# Patient Record
Sex: FEMALE | Race: WHITE | NOT HISPANIC OR LATINO | Employment: OTHER | URBAN - METROPOLITAN AREA
[De-identification: names, ages, dates, MRNs, and addresses within clinical notes are randomized per-mention and may not be internally consistent; named-entity substitution may affect disease eponyms.]

---

## 2017-03-15 ENCOUNTER — TRANSCRIBE ORDERS (OUTPATIENT)
Dept: ADMINISTRATIVE | Facility: HOSPITAL | Age: 72
End: 2017-03-15

## 2017-03-15 DIAGNOSIS — Z12.31 VISIT FOR SCREENING MAMMOGRAM: Primary | ICD-10-CM

## 2017-04-24 ENCOUNTER — HOSPITAL ENCOUNTER (OUTPATIENT)
Dept: MAMMOGRAPHY | Facility: HOSPITAL | Age: 72
Discharge: HOME | End: 2017-04-24
Admitting: INTERNAL MEDICINE

## 2017-04-24 DIAGNOSIS — Z12.31 VISIT FOR SCREENING MAMMOGRAM: ICD-10-CM

## 2017-04-24 PROCEDURE — 77063 BREAST TOMOSYNTHESIS BI: CPT | Performed by: RADIOLOGY

## 2017-04-24 PROCEDURE — G0202 SCR MAMMO BI INCL CAD: HCPCS

## 2017-04-24 PROCEDURE — 77063 BREAST TOMOSYNTHESIS BI: CPT

## 2017-04-24 PROCEDURE — G0202 SCR MAMMO BI INCL CAD: HCPCS | Performed by: RADIOLOGY

## 2017-10-22 ENCOUNTER — APPOINTMENT (OUTPATIENT)
Dept: CT IMAGING | Facility: HOSPITAL | Age: 72
End: 2017-10-22

## 2017-10-22 ENCOUNTER — HOSPITAL ENCOUNTER (EMERGENCY)
Facility: HOSPITAL | Age: 72
Discharge: HOME | End: 2017-10-22
Attending: EMERGENCY MEDICINE | Admitting: EMERGENCY MEDICINE

## 2017-10-22 VITALS
DIASTOLIC BLOOD PRESSURE: 71 MMHG | BODY MASS INDEX: 27.38 KG/M2 | WEIGHT: 145 LBS | SYSTOLIC BLOOD PRESSURE: 138 MMHG | TEMPERATURE: 97.8 F | HEART RATE: 81 BPM | HEIGHT: 61 IN | OXYGEN SATURATION: 97 % | RESPIRATION RATE: 16 BRPM

## 2017-10-22 DIAGNOSIS — M54.41 ACUTE LOW BACK PAIN WITH BILATERAL SCIATICA, UNSPECIFIED BACK PAIN LATERALITY: Primary | ICD-10-CM

## 2017-10-22 DIAGNOSIS — M54.42 ACUTE LOW BACK PAIN WITH BILATERAL SCIATICA, UNSPECIFIED BACK PAIN LATERALITY: Primary | ICD-10-CM

## 2017-10-22 LAB
ALBUMIN SERPL-MCNC: 4.7 G/DL (ref 3.2–4.8)
ALBUMIN/GLOB SERPL: 1.5 G/DL (ref 1.5–2.5)
ALP SERPL-CCNC: 40 U/L (ref 25–100)
ALT SERPL W P-5'-P-CCNC: 22 U/L (ref 7–40)
ANION GAP SERPL CALCULATED.3IONS-SCNC: 6 MMOL/L (ref 3–11)
AST SERPL-CCNC: 24 U/L (ref 0–33)
BASOPHILS # BLD AUTO: 0.04 10*3/MM3 (ref 0–0.2)
BASOPHILS NFR BLD AUTO: 0.6 % (ref 0–1)
BILIRUB SERPL-MCNC: 0.6 MG/DL (ref 0.3–1.2)
BILIRUB UR QL STRIP: NEGATIVE
BUN BLD-MCNC: 17 MG/DL (ref 9–23)
BUN/CREAT SERPL: 21.3 (ref 7–25)
CALCIUM SPEC-SCNC: 9.6 MG/DL (ref 8.7–10.4)
CHLORIDE SERPL-SCNC: 105 MMOL/L (ref 99–109)
CLARITY UR: CLEAR
CO2 SERPL-SCNC: 26 MMOL/L (ref 20–31)
COLOR UR: YELLOW
CREAT BLD-MCNC: 0.8 MG/DL (ref 0.6–1.3)
DEPRECATED RDW RBC AUTO: 41 FL (ref 37–54)
EOSINOPHIL # BLD AUTO: 0.19 10*3/MM3 (ref 0–0.3)
EOSINOPHIL NFR BLD AUTO: 2.7 % (ref 0–3)
ERYTHROCYTE [DISTWIDTH] IN BLOOD BY AUTOMATED COUNT: 13.4 % (ref 11.3–14.5)
GFR SERPL CREATININE-BSD FRML MDRD: 71 ML/MIN/1.73
GLOBULIN UR ELPH-MCNC: 3.1 GM/DL
GLUCOSE BLD-MCNC: 101 MG/DL (ref 70–100)
GLUCOSE UR STRIP-MCNC: NEGATIVE MG/DL
HCT VFR BLD AUTO: 41.3 % (ref 34.5–44)
HGB BLD-MCNC: 14.2 G/DL (ref 11.5–15.5)
HGB UR QL STRIP.AUTO: NEGATIVE
HOLD SPECIMEN: NORMAL
HOLD SPECIMEN: NORMAL
IMM GRANULOCYTES # BLD: 0.04 10*3/MM3 (ref 0–0.03)
IMM GRANULOCYTES NFR BLD: 0.6 % (ref 0–0.6)
KETONES UR QL STRIP: NEGATIVE
LEUKOCYTE ESTERASE UR QL STRIP.AUTO: NEGATIVE
LIPASE SERPL-CCNC: 44 U/L (ref 6–51)
LYMPHOCYTES # BLD AUTO: 1.87 10*3/MM3 (ref 0.6–4.8)
LYMPHOCYTES NFR BLD AUTO: 26.1 % (ref 24–44)
MCH RBC QN AUTO: 29 PG (ref 27–31)
MCHC RBC AUTO-ENTMCNC: 34.4 G/DL (ref 32–36)
MCV RBC AUTO: 84.5 FL (ref 80–99)
MONOCYTES # BLD AUTO: 0.66 10*3/MM3 (ref 0–1)
MONOCYTES NFR BLD AUTO: 9.2 % (ref 0–12)
NEUTROPHILS # BLD AUTO: 4.36 10*3/MM3 (ref 1.5–8.3)
NEUTROPHILS NFR BLD AUTO: 60.8 % (ref 41–71)
NITRITE UR QL STRIP: NEGATIVE
PH UR STRIP.AUTO: 7.5 [PH] (ref 5–8)
PLATELET # BLD AUTO: 255 10*3/MM3 (ref 150–450)
PMV BLD AUTO: 9.7 FL (ref 6–12)
POTASSIUM BLD-SCNC: 4 MMOL/L (ref 3.5–5.5)
PROT SERPL-MCNC: 7.8 G/DL (ref 5.7–8.2)
PROT UR QL STRIP: NEGATIVE
RBC # BLD AUTO: 4.89 10*6/MM3 (ref 3.89–5.14)
SODIUM BLD-SCNC: 137 MMOL/L (ref 132–146)
SP GR UR STRIP: 1.01 (ref 1–1.03)
UROBILINOGEN UR QL STRIP: NORMAL
WBC NRBC COR # BLD: 7.16 10*3/MM3 (ref 3.5–10.8)
WHOLE BLOOD HOLD SPECIMEN: NORMAL
WHOLE BLOOD HOLD SPECIMEN: NORMAL

## 2017-10-22 PROCEDURE — 72131 CT LUMBAR SPINE W/O DYE: CPT

## 2017-10-22 PROCEDURE — 74176 CT ABD & PELVIS W/O CONTRAST: CPT

## 2017-10-22 PROCEDURE — 96374 THER/PROPH/DIAG INJ IV PUSH: CPT

## 2017-10-22 PROCEDURE — 80053 COMPREHEN METABOLIC PANEL: CPT | Performed by: EMERGENCY MEDICINE

## 2017-10-22 PROCEDURE — 85025 COMPLETE CBC W/AUTO DIFF WBC: CPT | Performed by: EMERGENCY MEDICINE

## 2017-10-22 PROCEDURE — 25010000002 ONDANSETRON PER 1 MG: Performed by: EMERGENCY MEDICINE

## 2017-10-22 PROCEDURE — 99284 EMERGENCY DEPT VISIT MOD MDM: CPT

## 2017-10-22 PROCEDURE — 96375 TX/PRO/DX INJ NEW DRUG ADDON: CPT

## 2017-10-22 PROCEDURE — 81003 URINALYSIS AUTO W/O SCOPE: CPT | Performed by: EMERGENCY MEDICINE

## 2017-10-22 PROCEDURE — 83690 ASSAY OF LIPASE: CPT | Performed by: EMERGENCY MEDICINE

## 2017-10-22 PROCEDURE — 25010000002 HYDROMORPHONE PER 4 MG: Performed by: EMERGENCY MEDICINE

## 2017-10-22 RX ORDER — AMLODIPINE BESYLATE 5 MG/1
5 TABLET ORAL DAILY
COMMUNITY

## 2017-10-22 RX ORDER — METHOCARBAMOL 750 MG/1
750 TABLET, FILM COATED ORAL 3 TIMES DAILY PRN
Qty: 15 TABLET | Refills: 0 | Status: SHIPPED | OUTPATIENT
Start: 2017-10-22 | End: 2017-11-10

## 2017-10-22 RX ORDER — ONDANSETRON 2 MG/ML
4 INJECTION INTRAMUSCULAR; INTRAVENOUS ONCE
Status: COMPLETED | OUTPATIENT
Start: 2017-10-22 | End: 2017-10-22

## 2017-10-22 RX ORDER — SODIUM CHLORIDE 0.9 % (FLUSH) 0.9 %
10 SYRINGE (ML) INJECTION AS NEEDED
Status: DISCONTINUED | OUTPATIENT
Start: 2017-10-22 | End: 2017-10-22 | Stop reason: HOSPADM

## 2017-10-22 RX ORDER — HYDROMORPHONE HYDROCHLORIDE 1 MG/ML
0.25 INJECTION, SOLUTION INTRAMUSCULAR; INTRAVENOUS; SUBCUTANEOUS ONCE
Status: COMPLETED | OUTPATIENT
Start: 2017-10-22 | End: 2017-10-22

## 2017-10-22 RX ADMIN — HYDROMORPHONE HYDROCHLORIDE 0.25 MG: 1 INJECTION, SOLUTION INTRAMUSCULAR; INTRAVENOUS; SUBCUTANEOUS at 12:29

## 2017-10-22 RX ADMIN — ONDANSETRON 4 MG: 2 INJECTION INTRAMUSCULAR; INTRAVENOUS at 12:28

## 2017-10-22 NOTE — ED PROVIDER NOTES
Subjective   HPI Comments: Patient sees Dr. Ruiz for chronic back pain gets injections about every 3 months.  Last injection was about 3 months ago.    Patient is a 72 y.o. female presenting with back pain.   History provided by:  Patient   used: No    Back Pain   Location:  Lumbar spine  Quality:  Aching and stiffness  Stiffness is present:  Unable to specify  Radiates to:  Does not radiate  Pain severity:  Severe  Onset quality:  Sudden  Duration:  2 days  Timing:  Constant  Chronicity:  New  Context comment:  Patient had no fall no trauma.  She has a history of kidney stones this feels somewhat similar but movement does increase her pain.  Relieved by:  Being still  Worsened by:  Movement  Ineffective treatments:  None tried  Associated symptoms: abdominal pain    Associated symptoms: no bladder incontinence, no dysuria, no fever, no leg pain, no numbness, no tingling, no weakness and no weight loss        Review of Systems   Constitutional: Negative for chills, fever and weight loss.   Respiratory: Negative for chest tightness, shortness of breath and wheezing.    Gastrointestinal: Positive for abdominal pain. Negative for diarrhea, nausea and vomiting.   Genitourinary: Negative for bladder incontinence, dysuria, hematuria and urgency.   Musculoskeletal: Positive for back pain.   Neurological: Negative for tingling, weakness and numbness.   Psychiatric/Behavioral: Negative.    All other systems reviewed and are negative.      History reviewed. No pertinent past medical history.    Allergies   Allergen Reactions   • Codeine    • Morphine And Related        Past Surgical History:   Procedure Laterality Date   • HYSTERECTOMY  1992       Family History   Problem Relation Age of Onset   • Breast cancer Neg Hx    • Ovarian cancer Neg Hx        Social History     Social History   • Marital status:      Spouse name: N/A   • Number of children: N/A   • Years of education: N/A     Social History  Main Topics   • Smoking status: Former Smoker   • Smokeless tobacco: None   • Alcohol use No   • Drug use: No   • Sexual activity: Not Asked     Other Topics Concern   • None     Social History Narrative   • None           Objective   Physical Exam   Constitutional: She is oriented to person, place, and time. She appears well-developed and well-nourished.   HENT:   Head: Normocephalic and atraumatic.   Right Ear: External ear normal.   Left Ear: External ear normal.   Nose: Nose normal.   Mouth/Throat: Oropharynx is clear and moist.   Eyes: Conjunctivae and EOM are normal. Pupils are equal, round, and reactive to light. No scleral icterus.   Neck: Normal range of motion. No thyromegaly present.   Cardiovascular: Normal rate, regular rhythm and normal heart sounds.    Pulmonary/Chest: Effort normal and breath sounds normal. No respiratory distress. She has no wheezes. She has no rales. She exhibits no tenderness.   Abdominal: Soft. Bowel sounds are normal. She exhibits no distension. There is tenderness (  Mild diffuse tenderness across the lower abdomen.  No guarding or rebound or rigidity no CVA tenderness and no mass).   Musculoskeletal: Normal range of motion.   Tender over the lumbar spine itself.  There is no step-off no crepitus.  There is no rash or lesions.   Lymphadenopathy:     She has no cervical adenopathy.   Neurological: She is alert and oriented to person, place, and time. She has normal reflexes. She displays normal reflexes. No cranial nerve deficit. Coordination normal.   Skin: Skin is warm and dry.   Psychiatric: She has a normal mood and affect. Her behavior is normal. Judgment and thought content normal.   Nursing note and vitals reviewed.      Procedures         ED Course  ED Course   Comment By Time   Patient's abdomen is completely relieved with the medication.  She is given a call Dr. Campbell in the morning to get a follow-up.  I discussed the findings of her CT scans and motor findings. Micah  OSMANI Olvera 10/22 1341        No results found for this or any previous visit (from the past 24 hour(s)).  Note: In addition to lab results from this visit, the labs listed above may include labs taken at another facility or during a different encounter within the last 24 hours. Please correlate lab times with ED admission and discharge times for further clarification of the services performed during this visit.    CT Abdomen Pelvis Without Contrast   Final Result   Degenerative changes identified throughout the spine and   pelvis. Diverticulosis of the colon without evidence of diverticulitis.   No acute intra-abdominal or pelvic abnormality is identified.       DICTATED:     10/22/2017   EDITED:          10/22/2017           This report was finalized on 10/23/2017 9:24 AM by Dr. Callie Barcenas MD.          CT Lumbar Spine Without Contrast   Final Result   Minimal multilevel degenerative changes seen throughout the   lumbar spine with slight curvature with no evidence of fracture or   dislocation. Minimal degenerative change is seen within the sacroiliac   joints bilaterally.       DICTATED:     10/22/2017   EDITED:          10/22/2017           This report was finalized on 10/23/2017 9:23 AM by Dr. Callie Barcenas MD.            Vitals:    10/22/17 1230 10/22/17 1300 10/22/17 1304 10/22/17 1405   BP: 160/92 144/73  138/71   BP Location:    Right arm   Patient Position:    Sitting   Pulse:    81   Resp:    16   Temp:       TempSrc:       SpO2:  91% 95% 97%   Weight:       Height:         Medications   HYDROmorphone (DILAUDID) injection 0.25 mg (0.25 mg Intravenous Given 10/22/17 1229)   ondansetron (ZOFRAN) injection 4 mg (4 mg Intravenous Given 10/22/17 1228)     ECG/EMG Results (last 24 hours)     ** No results found for the last 24 hours. **                  MDM      Final diagnoses:   Acute low back pain with bilateral sciatica, unspecified back pain laterality            Micah Reynaga  PA  10/29/17 1739

## 2017-11-10 ENCOUNTER — APPOINTMENT (OUTPATIENT)
Dept: PREADMISSION TESTING | Facility: HOSPITAL | Age: 72
End: 2017-11-10

## 2017-11-10 VITALS — BODY MASS INDEX: 28.72 KG/M2 | HEIGHT: 61 IN | WEIGHT: 152.12 LBS

## 2017-11-10 LAB
DEPRECATED RDW RBC AUTO: 40.2 FL (ref 37–54)
ERYTHROCYTE [DISTWIDTH] IN BLOOD BY AUTOMATED COUNT: 13.2 % (ref 11.3–14.5)
HBA1C MFR BLD: 5.4 % (ref 4.8–5.6)
HCT VFR BLD AUTO: 39.1 % (ref 34.5–44)
HGB BLD-MCNC: 13.5 G/DL (ref 11.5–15.5)
MCH RBC QN AUTO: 28.9 PG (ref 27–31)
MCHC RBC AUTO-ENTMCNC: 34.5 G/DL (ref 32–36)
MCV RBC AUTO: 83.7 FL (ref 80–99)
PLATELET # BLD AUTO: 298 10*3/MM3 (ref 150–450)
PMV BLD AUTO: 9.4 FL (ref 6–12)
RBC # BLD AUTO: 4.67 10*6/MM3 (ref 3.89–5.14)
WBC NRBC COR # BLD: 6.95 10*3/MM3 (ref 3.5–10.8)

## 2017-11-10 PROCEDURE — 36415 COLL VENOUS BLD VENIPUNCTURE: CPT

## 2017-11-10 PROCEDURE — 83036 HEMOGLOBIN GLYCOSYLATED A1C: CPT | Performed by: ORTHOPAEDIC SURGERY

## 2017-11-10 PROCEDURE — 85027 COMPLETE CBC AUTOMATED: CPT | Performed by: ORTHOPAEDIC SURGERY

## 2017-11-10 RX ORDER — RANITIDINE 150 MG/1
150 TABLET ORAL NIGHTLY
Status: ON HOLD | COMMUNITY
End: 2018-08-15

## 2017-11-10 NOTE — DISCHARGE INSTRUCTIONS
The following information and instructions were given:    NPO after MN except sips of water with routine prescribed medication (except blood thinner, diabetes, or weight reducing medication) unless otherwise instructed by your physician.  Do not eat, drink, smoke or chew gum after MN the night before surgery. This also includes no mints.    DO NOT shave for two days before your procedure.  Do not wear makeup.      DO NOT wear fingernail polish (gel/regular) and/or acrylic/artificial nails on the day of surgery.   If a patient had recent manicure and would rather not remove polish or artificial nails, then the minimum requirement is that the polish/artificial nails must be removed from the middle finger on each hand.      If patient was having surgery on an upper extremity, then the patient was instructed that fingernail polish/artificial fingernails must be removed for surgery.  NO EXCEPTIONS.      If patient was having surgery on a lower extremity, then the patient was instructed that toenail polish on both extremities must be removed for surgery.  NO EXCEPTIONS.    Remove all jewelry (advised to go to jeweler if unable to remove).  Jewelry especially rings can no longer be taped for surgery.    Leave anything you consider valuable at home.    Leave your suitcase in the car until after your surgery.    Bring the following with you (if applicable)   -picture ID and insurance cards   -Co-pay/deductible required by insurance   -Medications in the original bottles (not a list) including all over-the-counter  medications if not brought to PAT   -Copy of advance directive, living will or power of  documents if not  brought to PAT   -CPAP or BIPAP mask and tubing (do not bring machine)   -Skin prep instructions sheet   -PAT Pass    Education booklet, brochure, handout or binder given to patient.    Pain Control After Surgery handout given to patient.    Respirex use (handout given to patient) and pneumonia  prevention.    Signs and Symptoms of infection.    DVT Prevention stressing the importance of ambulation.    Patient to apply Chlorhexadine wipes to surgical area (as instructed) the night before procedure and the AM of procedure.      Lumbar booklet given

## 2017-11-15 ENCOUNTER — APPOINTMENT (OUTPATIENT)
Dept: GENERAL RADIOLOGY | Facility: HOSPITAL | Age: 72
End: 2017-11-15

## 2017-11-15 ENCOUNTER — ANESTHESIA (OUTPATIENT)
Dept: PERIOP | Facility: HOSPITAL | Age: 72
End: 2017-11-15

## 2017-11-15 ENCOUNTER — ANESTHESIA EVENT (OUTPATIENT)
Dept: PERIOP | Facility: HOSPITAL | Age: 72
End: 2017-11-15

## 2017-11-15 ENCOUNTER — HOSPITAL ENCOUNTER (INPATIENT)
Facility: HOSPITAL | Age: 72
LOS: 3 days | Discharge: HOME HEALTH | End: 2017-11-18
Attending: ORTHOPAEDIC SURGERY | Admitting: ORTHOPAEDIC SURGERY

## 2017-11-15 DIAGNOSIS — Z78.9 IMPAIRED MOBILITY AND ADLS: Primary | ICD-10-CM

## 2017-11-15 DIAGNOSIS — Z74.09 IMPAIRED FUNCTIONAL MOBILITY, BALANCE, GAIT, AND ENDURANCE: ICD-10-CM

## 2017-11-15 DIAGNOSIS — Z74.09 IMPAIRED MOBILITY AND ADLS: Primary | ICD-10-CM

## 2017-11-15 DIAGNOSIS — Z98.1 S/P LUMBAR FUSION: ICD-10-CM

## 2017-11-15 PROBLEM — I10 HTN (HYPERTENSION): Status: ACTIVE | Noted: 2017-11-15

## 2017-11-15 PROBLEM — M54.9 BACK PAIN: Status: ACTIVE | Noted: 2017-11-15

## 2017-11-15 PROBLEM — K21.9 GERD (GASTROESOPHAGEAL REFLUX DISEASE): Status: ACTIVE | Noted: 2017-11-15

## 2017-11-15 LAB — POTASSIUM BLDA-SCNC: 3.79 MMOL/L (ref 3.5–5.3)

## 2017-11-15 PROCEDURE — C1713 ANCHOR/SCREW BN/BN,TIS/BN: HCPCS | Performed by: ORTHOPAEDIC SURGERY

## 2017-11-15 PROCEDURE — 76000 FLUOROSCOPY <1 HR PHYS/QHP: CPT

## 2017-11-15 PROCEDURE — 25010000003 CEFAZOLIN IN DEXTROSE 2-4 GM/100ML-% SOLUTION: Performed by: ORTHOPAEDIC SURGERY

## 2017-11-15 PROCEDURE — 25010000002 PHENYLEPHRINE PER 1 ML: Performed by: NURSE ANESTHETIST, CERTIFIED REGISTERED

## 2017-11-15 PROCEDURE — 0SB20ZZ EXCISION OF LUMBAR VERTEBRAL DISC, OPEN APPROACH: ICD-10-PCS | Performed by: ORTHOPAEDIC SURGERY

## 2017-11-15 PROCEDURE — 25010000002 FENTANYL CITRATE (PF) 100 MCG/2ML SOLUTION: Performed by: NURSE ANESTHETIST, CERTIFIED REGISTERED

## 2017-11-15 PROCEDURE — 25010000002 ONDANSETRON PER 1 MG: Performed by: NURSE ANESTHETIST, CERTIFIED REGISTERED

## 2017-11-15 PROCEDURE — 25010000002 PROMETHAZINE PER 50 MG: Performed by: NURSE ANESTHETIST, CERTIFIED REGISTERED

## 2017-11-15 PROCEDURE — 0SG0071 FUSION OF LUMBAR VERTEBRAL JOINT WITH AUTOLOGOUS TISSUE SUBSTITUTE, POSTERIOR APPROACH, POSTERIOR COLUMN, OPEN APPROACH: ICD-10-PCS | Performed by: ORTHOPAEDIC SURGERY

## 2017-11-15 PROCEDURE — 84132 ASSAY OF SERUM POTASSIUM: CPT | Performed by: ORTHOPAEDIC SURGERY

## 2017-11-15 PROCEDURE — 0SG00AJ FUSION OF LUMBAR VERTEBRAL JOINT WITH INTERBODY FUSION DEVICE, POSTERIOR APPROACH, ANTERIOR COLUMN, OPEN APPROACH: ICD-10-PCS | Performed by: ORTHOPAEDIC SURGERY

## 2017-11-15 PROCEDURE — 76001 HC FLUORO GREATER THAN 1 HOUR: CPT

## 2017-11-15 PROCEDURE — 25010000002 DEXAMETHASONE PER 1 MG: Performed by: NURSE ANESTHETIST, CERTIFIED REGISTERED

## 2017-11-15 PROCEDURE — 25810000003 SODIUM CHLORIDE 0.9 % WITH KCL 20 MEQ 20-0.9 MEQ/L-% SOLUTION: Performed by: ORTHOPAEDIC SURGERY

## 2017-11-15 PROCEDURE — 25010000002 NEOSTIGMINE PER 0.5 MG: Performed by: NURSE ANESTHETIST, CERTIFIED REGISTERED

## 2017-11-15 PROCEDURE — 25010000002 HEPARIN (PORCINE) PER 1000 UNITS

## 2017-11-15 PROCEDURE — 25010000002 PROPOFOL 10 MG/ML EMULSION: Performed by: NURSE ANESTHETIST, CERTIFIED REGISTERED

## 2017-11-15 DEVICE — SCRW VIPER INNR ST: Type: IMPLANTABLE DEVICE | Site: SPINE LUMBAR | Status: FUNCTIONAL

## 2017-11-15 DEVICE — SCRW CORT VIPER PLS5.5 TI FIX 6X40MM: Type: IMPLANTABLE DEVICE | Site: SPINE LUMBAR | Status: FUNCTIONAL

## 2017-11-15 DEVICE — SPACR OPAL 10X12X28MM: Type: IMPLANTABLE DEVICE | Site: SPINE LUMBAR | Status: FUNCTIONAL

## 2017-11-15 DEVICE — ROD PREBNT SPINE EXPEDIUM TI 5.5X35MM: Type: IMPLANTABLE DEVICE | Site: SPINE LUMBAR | Status: FUNCTIONAL

## 2017-11-15 RX ORDER — GLYCOPYRROLATE 0.2 MG/ML
INJECTION INTRAMUSCULAR; INTRAVENOUS AS NEEDED
Status: DISCONTINUED | OUTPATIENT
Start: 2017-11-15 | End: 2017-11-15 | Stop reason: SURG

## 2017-11-15 RX ORDER — CEFAZOLIN SODIUM 2 G/100ML
2 INJECTION, SOLUTION INTRAVENOUS ONCE
Status: COMPLETED | OUTPATIENT
Start: 2017-11-15 | End: 2017-11-15

## 2017-11-15 RX ORDER — FAMOTIDINE 20 MG/1
20 TABLET, FILM COATED ORAL ONCE
Status: DISCONTINUED | OUTPATIENT
Start: 2017-11-15 | End: 2017-11-15 | Stop reason: SDUPTHER

## 2017-11-15 RX ORDER — ONDANSETRON 2 MG/ML
INJECTION INTRAMUSCULAR; INTRAVENOUS AS NEEDED
Status: DISCONTINUED | OUTPATIENT
Start: 2017-11-15 | End: 2017-11-15 | Stop reason: SURG

## 2017-11-15 RX ORDER — LIDOCAINE HYDROCHLORIDE 10 MG/ML
0.5 INJECTION, SOLUTION EPIDURAL; INFILTRATION; INTRACAUDAL; PERINEURAL ONCE AS NEEDED
Status: DISCONTINUED | OUTPATIENT
Start: 2017-11-15 | End: 2017-11-15

## 2017-11-15 RX ORDER — BISACODYL 10 MG
10 SUPPOSITORY, RECTAL RECTAL DAILY PRN
Status: DISCONTINUED | OUTPATIENT
Start: 2017-11-15 | End: 2017-11-18 | Stop reason: HOSPADM

## 2017-11-15 RX ORDER — SODIUM CHLORIDE AND POTASSIUM CHLORIDE 150; 900 MG/100ML; MG/100ML
100 INJECTION, SOLUTION INTRAVENOUS CONTINUOUS
Status: DISCONTINUED | OUTPATIENT
Start: 2017-11-15 | End: 2017-11-18 | Stop reason: HOSPADM

## 2017-11-15 RX ORDER — HYDROCODONE BITARTRATE AND ACETAMINOPHEN 7.5; 325 MG/1; MG/1
1 TABLET ORAL EVERY 6 HOURS PRN
Status: DISCONTINUED | OUTPATIENT
Start: 2017-11-15 | End: 2017-11-18 | Stop reason: HOSPADM

## 2017-11-15 RX ORDER — PROMETHAZINE HYDROCHLORIDE 25 MG/ML
6.25 INJECTION, SOLUTION INTRAMUSCULAR; INTRAVENOUS ONCE AS NEEDED
Status: COMPLETED | OUTPATIENT
Start: 2017-11-15 | End: 2017-11-15

## 2017-11-15 RX ORDER — SODIUM CHLORIDE 0.9 % (FLUSH) 0.9 %
1-10 SYRINGE (ML) INJECTION AS NEEDED
Status: DISCONTINUED | OUTPATIENT
Start: 2017-11-15 | End: 2017-11-15

## 2017-11-15 RX ORDER — PROPOFOL 10 MG/ML
VIAL (ML) INTRAVENOUS AS NEEDED
Status: DISCONTINUED | OUTPATIENT
Start: 2017-11-15 | End: 2017-11-15 | Stop reason: SURG

## 2017-11-15 RX ORDER — BUPIVACAINE HYDROCHLORIDE AND EPINEPHRINE 2.5; 5 MG/ML; UG/ML
INJECTION, SOLUTION INFILTRATION; PERINEURAL AS NEEDED
Status: DISCONTINUED | OUTPATIENT
Start: 2017-11-15 | End: 2017-11-15 | Stop reason: HOSPADM

## 2017-11-15 RX ORDER — BISACODYL 5 MG/1
5 TABLET, DELAYED RELEASE ORAL DAILY PRN
Status: DISCONTINUED | OUTPATIENT
Start: 2017-11-15 | End: 2017-11-18 | Stop reason: HOSPADM

## 2017-11-15 RX ORDER — TRAMADOL HYDROCHLORIDE 50 MG/1
50 TABLET ORAL EVERY 6 HOURS PRN
Status: DISCONTINUED | OUTPATIENT
Start: 2017-11-15 | End: 2017-11-15 | Stop reason: SDUPTHER

## 2017-11-15 RX ORDER — ONDANSETRON 2 MG/ML
4 INJECTION INTRAMUSCULAR; INTRAVENOUS EVERY 6 HOURS PRN
Status: DISCONTINUED | OUTPATIENT
Start: 2017-11-15 | End: 2017-11-18 | Stop reason: HOSPADM

## 2017-11-15 RX ORDER — LIDOCAINE HYDROCHLORIDE 10 MG/ML
INJECTION, SOLUTION INFILTRATION; PERINEURAL AS NEEDED
Status: DISCONTINUED | OUTPATIENT
Start: 2017-11-15 | End: 2017-11-15 | Stop reason: SURG

## 2017-11-15 RX ORDER — SODIUM CHLORIDE, SODIUM LACTATE, POTASSIUM CHLORIDE, CALCIUM CHLORIDE 600; 310; 30; 20 MG/100ML; MG/100ML; MG/100ML; MG/100ML
9 INJECTION, SOLUTION INTRAVENOUS CONTINUOUS PRN
Status: DISCONTINUED | OUTPATIENT
Start: 2017-11-15 | End: 2017-11-15 | Stop reason: HOSPADM

## 2017-11-15 RX ORDER — FAMOTIDINE 20 MG/1
20 TABLET, FILM COATED ORAL DAILY
Status: DISCONTINUED | OUTPATIENT
Start: 2017-11-15 | End: 2017-11-17 | Stop reason: SDUPTHER

## 2017-11-15 RX ORDER — HYDROCODONE BITARTRATE AND ACETAMINOPHEN 10; 325 MG/1; MG/1
1 TABLET ORAL EVERY 6 HOURS PRN
Status: CANCELLED | OUTPATIENT
Start: 2017-11-15 | End: 2017-11-25

## 2017-11-15 RX ORDER — HYDROMORPHONE HYDROCHLORIDE 1 MG/ML
0.5 INJECTION, SOLUTION INTRAMUSCULAR; INTRAVENOUS; SUBCUTANEOUS
Status: DISCONTINUED | OUTPATIENT
Start: 2017-11-15 | End: 2017-11-15 | Stop reason: HOSPADM

## 2017-11-15 RX ORDER — ACETAMINOPHEN 325 MG/1
650 TABLET ORAL EVERY 4 HOURS PRN
Status: DISCONTINUED | OUTPATIENT
Start: 2017-11-15 | End: 2017-11-18 | Stop reason: HOSPADM

## 2017-11-15 RX ORDER — AMLODIPINE BESYLATE 5 MG/1
5 TABLET ORAL DAILY
Status: DISCONTINUED | OUTPATIENT
Start: 2017-11-15 | End: 2017-11-18 | Stop reason: HOSPADM

## 2017-11-15 RX ORDER — FAMOTIDINE 20 MG/1
20 TABLET, FILM COATED ORAL EVERY 12 HOURS SCHEDULED
Status: DISCONTINUED | OUTPATIENT
Start: 2017-11-15 | End: 2017-11-18 | Stop reason: HOSPADM

## 2017-11-15 RX ORDER — CEFAZOLIN SODIUM 2 G/100ML
2 INJECTION, SOLUTION INTRAVENOUS EVERY 8 HOURS
Status: COMPLETED | OUTPATIENT
Start: 2017-11-15 | End: 2017-11-16

## 2017-11-15 RX ORDER — LIDOCAINE HYDROCHLORIDE 10 MG/ML
0.5 INJECTION, SOLUTION EPIDURAL; INFILTRATION; INTRACAUDAL; PERINEURAL ONCE AS NEEDED
Status: COMPLETED | OUTPATIENT
Start: 2017-11-15 | End: 2017-11-15

## 2017-11-15 RX ORDER — FENTANYL CITRATE 50 UG/ML
INJECTION, SOLUTION INTRAMUSCULAR; INTRAVENOUS AS NEEDED
Status: DISCONTINUED | OUTPATIENT
Start: 2017-11-15 | End: 2017-11-15 | Stop reason: SURG

## 2017-11-15 RX ORDER — MAGNESIUM HYDROXIDE 1200 MG/15ML
LIQUID ORAL AS NEEDED
Status: DISCONTINUED | OUTPATIENT
Start: 2017-11-15 | End: 2017-11-15 | Stop reason: HOSPADM

## 2017-11-15 RX ORDER — FAMOTIDINE 10 MG/ML
20 INJECTION, SOLUTION INTRAVENOUS EVERY 12 HOURS SCHEDULED
Status: DISCONTINUED | OUTPATIENT
Start: 2017-11-15 | End: 2017-11-18 | Stop reason: HOSPADM

## 2017-11-15 RX ORDER — SODIUM CHLORIDE 0.9 % (FLUSH) 0.9 %
1-10 SYRINGE (ML) INJECTION AS NEEDED
Status: DISCONTINUED | OUTPATIENT
Start: 2017-11-15 | End: 2017-11-18 | Stop reason: HOSPADM

## 2017-11-15 RX ORDER — FAMOTIDINE 10 MG/ML
20 INJECTION, SOLUTION INTRAVENOUS ONCE
Status: DISCONTINUED | OUTPATIENT
Start: 2017-11-15 | End: 2017-11-15 | Stop reason: SDUPTHER

## 2017-11-15 RX ORDER — TRAMADOL HYDROCHLORIDE 50 MG/1
50 TABLET ORAL EVERY 6 HOURS PRN
Status: DISCONTINUED | OUTPATIENT
Start: 2017-11-15 | End: 2017-11-18 | Stop reason: HOSPADM

## 2017-11-15 RX ORDER — FAMOTIDINE 10 MG/ML
20 INJECTION, SOLUTION INTRAVENOUS ONCE
Status: COMPLETED | OUTPATIENT
Start: 2017-11-15 | End: 2017-11-15

## 2017-11-15 RX ORDER — ZOLPIDEM TARTRATE 5 MG/1
5 TABLET ORAL NIGHTLY PRN
Status: DISCONTINUED | OUTPATIENT
Start: 2017-11-15 | End: 2017-11-18 | Stop reason: HOSPADM

## 2017-11-15 RX ORDER — FENTANYL CITRATE 50 UG/ML
50 INJECTION, SOLUTION INTRAMUSCULAR; INTRAVENOUS
Status: DISCONTINUED | OUTPATIENT
Start: 2017-11-15 | End: 2017-11-15 | Stop reason: HOSPADM

## 2017-11-15 RX ORDER — PROMETHAZINE HYDROCHLORIDE 25 MG/1
25 SUPPOSITORY RECTAL ONCE AS NEEDED
Status: COMPLETED | OUTPATIENT
Start: 2017-11-15 | End: 2017-11-15

## 2017-11-15 RX ORDER — FAMOTIDINE 20 MG/1
20 TABLET, FILM COATED ORAL ONCE
Status: COMPLETED | OUTPATIENT
Start: 2017-11-15 | End: 2017-11-15

## 2017-11-15 RX ORDER — DEXAMETHASONE SODIUM PHOSPHATE 4 MG/ML
INJECTION, SOLUTION INTRA-ARTICULAR; INTRALESIONAL; INTRAMUSCULAR; INTRAVENOUS; SOFT TISSUE AS NEEDED
Status: DISCONTINUED | OUTPATIENT
Start: 2017-11-15 | End: 2017-11-15 | Stop reason: SURG

## 2017-11-15 RX ORDER — SODIUM CHLORIDE, SODIUM LACTATE, POTASSIUM CHLORIDE, CALCIUM CHLORIDE 600; 310; 30; 20 MG/100ML; MG/100ML; MG/100ML; MG/100ML
9 INJECTION, SOLUTION INTRAVENOUS CONTINUOUS
Status: DISCONTINUED | OUTPATIENT
Start: 2017-11-15 | End: 2017-11-15 | Stop reason: SDUPTHER

## 2017-11-15 RX ORDER — HYDROMORPHONE HYDROCHLORIDE 1 MG/ML
0.5 INJECTION, SOLUTION INTRAMUSCULAR; INTRAVENOUS; SUBCUTANEOUS
Status: DISCONTINUED | OUTPATIENT
Start: 2017-11-15 | End: 2017-11-18 | Stop reason: HOSPADM

## 2017-11-15 RX ORDER — MEPERIDINE HYDROCHLORIDE 25 MG/ML
12.5 INJECTION INTRAMUSCULAR; INTRAVENOUS; SUBCUTANEOUS
Status: DISCONTINUED | OUTPATIENT
Start: 2017-11-15 | End: 2017-11-15 | Stop reason: HOSPADM

## 2017-11-15 RX ORDER — PROMETHAZINE HYDROCHLORIDE 25 MG/1
25 TABLET ORAL ONCE AS NEEDED
Status: COMPLETED | OUTPATIENT
Start: 2017-11-15 | End: 2017-11-15

## 2017-11-15 RX ORDER — ATRACURIUM BESYLATE 10 MG/ML
INJECTION, SOLUTION INTRAVENOUS AS NEEDED
Status: DISCONTINUED | OUTPATIENT
Start: 2017-11-15 | End: 2017-11-15 | Stop reason: SURG

## 2017-11-15 RX ORDER — HYDRALAZINE HYDROCHLORIDE 20 MG/ML
10 INJECTION INTRAMUSCULAR; INTRAVENOUS EVERY 6 HOURS PRN
Status: DISCONTINUED | OUTPATIENT
Start: 2017-11-15 | End: 2017-11-18 | Stop reason: HOSPADM

## 2017-11-15 RX ADMIN — LIDOCAINE HYDROCHLORIDE 50 MG: 10 INJECTION, SOLUTION INFILTRATION; PERINEURAL at 08:12

## 2017-11-15 RX ADMIN — LIDOCAINE HYDROCHLORIDE 0.5 ML: 10 INJECTION, SOLUTION EPIDURAL; INFILTRATION; INTRACAUDAL; PERINEURAL at 06:30

## 2017-11-15 RX ADMIN — DEXAMETHASONE SODIUM PHOSPHATE 8 MG: 4 INJECTION, SOLUTION INTRAMUSCULAR; INTRAVENOUS at 08:20

## 2017-11-15 RX ADMIN — PHENYLEPHRINE HYDROCHLORIDE 80 MCG: 10 INJECTION INTRAVENOUS at 08:27

## 2017-11-15 RX ADMIN — HYDROCODONE BITARTRATE AND ACETAMINOPHEN 1 TABLET: 7.5; 325 TABLET ORAL at 17:03

## 2017-11-15 RX ADMIN — PHENYLEPHRINE HYDROCHLORIDE 80 MCG: 10 INJECTION INTRAVENOUS at 09:12

## 2017-11-15 RX ADMIN — ATRACURIUM BESYLATE 10 MG: 10 INJECTION, SOLUTION INTRAVENOUS at 08:40

## 2017-11-15 RX ADMIN — PHENYLEPHRINE HYDROCHLORIDE 80 MCG: 10 INJECTION INTRAVENOUS at 08:51

## 2017-11-15 RX ADMIN — ATRACURIUM BESYLATE 40 MG: 10 INJECTION, SOLUTION INTRAVENOUS at 08:12

## 2017-11-15 RX ADMIN — CEFAZOLIN SODIUM 2 G: 2 INJECTION, SOLUTION INTRAVENOUS at 08:08

## 2017-11-15 RX ADMIN — CEFAZOLIN SODIUM 2 G: 2 INJECTION, SOLUTION INTRAVENOUS at 18:24

## 2017-11-15 RX ADMIN — PHENYLEPHRINE HYDROCHLORIDE 80 MCG: 10 INJECTION INTRAVENOUS at 09:42

## 2017-11-15 RX ADMIN — CEFAZOLIN SODIUM 2 G: 2 INJECTION, SOLUTION INTRAVENOUS at 23:56

## 2017-11-15 RX ADMIN — PROPOFOL 200 MG: 10 INJECTION, EMULSION INTRAVENOUS at 08:12

## 2017-11-15 RX ADMIN — SODIUM CHLORIDE, POTASSIUM CHLORIDE, SODIUM LACTATE AND CALCIUM CHLORIDE 9 ML/HR: 600; 310; 30; 20 INJECTION, SOLUTION INTRAVENOUS at 06:30

## 2017-11-15 RX ADMIN — ONDANSETRON 4 MG: 2 INJECTION INTRAMUSCULAR; INTRAVENOUS at 10:47

## 2017-11-15 RX ADMIN — MUPIROCIN: 20 OINTMENT TOPICAL at 06:40

## 2017-11-15 RX ADMIN — FENTANYL CITRATE 25 MCG: 50 INJECTION, SOLUTION INTRAMUSCULAR; INTRAVENOUS at 10:55

## 2017-11-15 RX ADMIN — FENTANYL CITRATE 50 MCG: 50 INJECTION INTRAMUSCULAR; INTRAVENOUS at 14:49

## 2017-11-15 RX ADMIN — FENTANYL CITRATE 25 MCG: 50 INJECTION, SOLUTION INTRAMUSCULAR; INTRAVENOUS at 10:03

## 2017-11-15 RX ADMIN — FAMOTIDINE 20 MG: 20 TABLET, FILM COATED ORAL at 21:12

## 2017-11-15 RX ADMIN — POTASSIUM CHLORIDE AND SODIUM CHLORIDE 100 ML/HR: 900; 150 INJECTION, SOLUTION INTRAVENOUS at 11:38

## 2017-11-15 RX ADMIN — PROMETHAZINE HYDROCHLORIDE 6.25 MG: 25 INJECTION INTRAMUSCULAR; INTRAVENOUS at 11:38

## 2017-11-15 RX ADMIN — FENTANYL CITRATE 50 MCG: 50 INJECTION, SOLUTION INTRAMUSCULAR; INTRAVENOUS at 08:12

## 2017-11-15 RX ADMIN — FENTANYL CITRATE 50 MCG: 50 INJECTION INTRAMUSCULAR; INTRAVENOUS at 12:00

## 2017-11-15 RX ADMIN — PHENYLEPHRINE HYDROCHLORIDE 160 MCG: 10 INJECTION INTRAVENOUS at 08:30

## 2017-11-15 RX ADMIN — Medication 3 MG: at 10:50

## 2017-11-15 RX ADMIN — PHENYLEPHRINE HYDROCHLORIDE 80 MCG: 10 INJECTION INTRAVENOUS at 10:30

## 2017-11-15 RX ADMIN — PHENYLEPHRINE HYDROCHLORIDE 80 MCG: 10 INJECTION INTRAVENOUS at 08:24

## 2017-11-15 RX ADMIN — SODIUM CHLORIDE, POTASSIUM CHLORIDE, SODIUM LACTATE AND CALCIUM CHLORIDE: 600; 310; 30; 20 INJECTION, SOLUTION INTRAVENOUS at 10:30

## 2017-11-15 RX ADMIN — AMLODIPINE BESYLATE 5 MG: 5 TABLET ORAL at 16:12

## 2017-11-15 RX ADMIN — FENTANYL CITRATE 50 MCG: 50 INJECTION INTRAMUSCULAR; INTRAVENOUS at 12:56

## 2017-11-15 RX ADMIN — FAMOTIDINE 20 MG: 20 TABLET, FILM COATED ORAL at 06:38

## 2017-11-15 RX ADMIN — FENTANYL CITRATE 25 MCG: 50 INJECTION, SOLUTION INTRAMUSCULAR; INTRAVENOUS at 09:29

## 2017-11-15 RX ADMIN — FENTANYL CITRATE 50 MCG: 50 INJECTION INTRAMUSCULAR; INTRAVENOUS at 12:44

## 2017-11-15 RX ADMIN — FAMOTIDINE 20 MG: 20 TABLET, FILM COATED ORAL at 16:12

## 2017-11-15 RX ADMIN — ATRACURIUM BESYLATE 5 MG: 10 INJECTION, SOLUTION INTRAVENOUS at 09:37

## 2017-11-15 RX ADMIN — TRAMADOL HYDROCHLORIDE 50 MG: 50 TABLET, COATED ORAL at 16:19

## 2017-11-15 RX ADMIN — GLYCOPYRROLATE 0.4 MG: 0.2 INJECTION, SOLUTION INTRAMUSCULAR; INTRAVENOUS at 10:50

## 2017-11-15 NOTE — H&P
Patient Name: Sarai Hardy  MRN: [id number]  : 1945  DOS: 11/15/2017    Attending: Micah Duarte MD    Primary Care Provider: Pam Reid MD      Chief complaint:  Back pain    Subjective   Patient is a 72 y.o. female presented for scheduled surgery listed below by Dr. Duarte under GA. She tolerated surgery well and is admitted for further medical management. Her back has been painful for about 6 months. She denies use of assistive device for ambulation.     PROCEDURE PERFORMED:  1.  Transforaminal lumbar interbody fusion, L4-L5, with DePuy/Synthes interbody fusion cage and bone graft.  2.  Segmental instrumentation, L4-L5, with DePuy transpedicular fixation.  3.  García Vickers type osteotomy, L4-L5, to facilitate reduction of spondylolisthesis and restoration of lordosis.  4.  Posterolateral fusion, L4-L5, with bone graft.  5.  Harvesting of bone graft locally from spinous processes and lamina.    When seen postop she is doing well. Her back is mildly painful. She complains of burning in her eyes. She denies numbness, tingling or pain of BLE. She denies nausea, shortness of breath or chest pain. No hx of DVT or PE.    ( Above is noted and agree. Seen later in her room, continues to do well. No f/c/n/vom/sob. Ambulated and felt very well with walking.)wy    Allergies:  Allergies   Allergen Reactions   • Codeine Other (See Comments)     Per patient makes goofy   • Morphine And Related Other (See Comments)     Makes goofy and ha       Meds:  Prescriptions Prior to Admission   Medication Sig Dispense Refill Last Dose   • amLODIPine (NORVASC) 5 MG tablet Take 5 mg by mouth Daily.   2017 at 0800   • estradiol (CLIMARA) 0.1 MG/24HR patch Place 1 patch on the skin 1 (One) Time Per Week.   2017   • Multiple Vitamins-Minerals (CENTRUM ADULTS PO) Take 1 tablet by mouth Daily.   2017 at 0800   • raNITIdine (ZANTAC) 150 MG tablet Take 150 mg by mouth Every Night.   2017 at 0800  "      History:   Past Medical History:   Diagnosis Date   • Arthritis    • Back pain    • Hypertension    • Wears glasses      Past Surgical History:   Procedure Laterality Date   • BLADDER SURGERY     • COLONOSCOPY     • HYSTERECTOMY  1992     Family History   Problem Relation Age of Onset   • Breast cancer Neg Hx    • Ovarian cancer Neg Hx      Social History   Substance Use Topics   • Smoking status: Former Smoker     Types: Cigarettes   • Smokeless tobacco: Never Used      Comment: 20 years   • Alcohol use No   She is  with 3 children. She is retired from cleaning service.    Review of Systems  All systems were reviewed and negative except for:  Gastrointestinal: postitive for  constipation and diarrhea    Vital Signs  /74  Pulse 95  Temp 98.3 °F (36.8 °C) (Temporal Artery )   Resp 18  Ht 61\" (154.9 cm)  Wt 152 lb 1.9 oz (69 kg)  SpO2 99%  BMI 28.74 kg/m2    Physical Exam:    General Appearance:    Alert, cooperative, in no acute distress   Head:    Normocephalic, without obvious abnormality, atraumatic   Eyes:            Lids and lashes normal, sceleral redness   Ears:    Ears appear intact with no abnormalities noted   Back:   Surgical dressing CDI. HV present   Lungs:     Clear to auscultation,respirations regular, even and                   unlabored    Heart:    Regular rhythm and normal rate, normal S1 and S2, 2/6          murmur, no gallop, no rub, no click   Abdomen:     Normal bowel sounds, no masses, no organomegaly, soft        non-tender, non-distended, no guarding, no rebound                 tenderness   Genitalia:    Deferred   Extremities:   Moves all extremities well, no edema, no cyanosis, no              redness   Pulses:   Pulses palpable and equal bilaterally   Skin:   No bleeding, bruising or rash   Neurologic:   Cranial nerves 2 - 12 grossly intact, sensation intact      I reviewed the patient's new clinical results.         Results from last 7 days  Lab Units " 11/10/17  1343   WBC 10*3/mm3 6.95   HEMOGLOBIN g/dL 13.5   HEMATOCRIT % 39.1   PLATELETS 10*3/mm3 298           Invalid input(s): LABALBU, PROT  Lab Results   Component Value Date    HGBA1C 5.40 11/10/2017       Assessment and Plan:   Principal Problem:    S/P lumbar fusion  Active Problems:    Back pain    HTN (hypertension)    GERD (gastroesophageal reflux disease)      Plan  1. PT/OT- ambulate  2. Pain control-prns   3. IS-encourage  4. DVT proph- [facility]  5. Bowel regimen  6. Resume home medications as appropriate  7. Monitor post-op labs  8. DC planning for [facility]. CM consult.    HTN  - Continue home norvasc  - Monitor BP q4 hrs  - Holding parameters for BP meds  - PRN hydralazine for SBP >170    GERD  - Continue home H2 blocker    Seen and examined by me. Agree with above. Discussed with patient. marshall Peterson MD  11/15/17  9:18 PM

## 2017-11-15 NOTE — ANESTHESIA POSTPROCEDURE EVALUATION
Patient: Sarai Hardy    Procedure Summary     Date Anesthesia Start Anesthesia Stop Room / Location    11/15/17 0808   [facility] / [facility]       Procedure Diagnosis Surgeon Provider    LUMBAR DECOMPRESSION AND FUSION WITH INSTRUMENTATION (N/A Spine Lumbar) No diagnosis on file. MD Christiano Lobato MD          Anesthesia Type: general  Last vitals  BP   167/80 (11/15/17 0630) 124/62   Temp 98.9   Pulse 89   Resp 16   SpO2 94     Post Anesthesia Care and Evaluation    Patient location during evaluation: PACU  Patient participation: waiting for patient participation  Level of consciousness: responsive to light touch  Pain score: 0  Pain management: adequate  Airway patency: patent  Anesthetic complications: No anesthetic complications  PONV Status: none  Cardiovascular status: hemodynamically stable and acceptable  Respiratory status: nonlabored ventilation, acceptable, nasal cannula and oral airway  Hydration status: acceptable

## 2017-11-15 NOTE — OP NOTE
PREOPERATIVE DIAGNOSES:  1.  L4-L5 spinal stenosis.  2.  L4-L5 degenerative spondylolisthesis.    POSTOPERATIVE DIAGNOSES:  1.  L4-L5 spinal stenosis.  2.  L4-L5 degenerative spondylolisthesis.    PROCEDURE PERFORMED:  1.  Transforaminal lumbar interbody fusion, L4-L5, with DePuy/Synthes interbody fusion cage and bone graft.  2.  Segmental instrumentation, L4-L5, with DePuy transpedicular fixation.  3.  García Vickers type osteotomy, L4-L5, to facilitate reduction of spondylolisthesis and restoration of lordosis.  4.  Posterolateral fusion, L4-L5, with bone graft.  5.  Harvesting of bone graft locally from spinous processes and lamina.    SURGEON:  Micah Duarte MD    ASSISTANT:  Ethel Preston PA-C    ANESTHESIA:  General.    OPERATIVE COURSE:  The patient was given a preoperative dose of intravenous Kefzol.  She was given a general anesthetic.  She was placed in the prone position on the Chris table.  The back was prepped and draped sterilely.  A midline incision was made.  The fascia was split.  The paraspinal musculature was elevated.  I identified L4-L5 by C-arm.    First transpedicular fixation was placed.  Under C-arm guidance, I placed pedicle screws at L4 and L5 uneventfully.  Two rods countered into lordosis were placed in the screws and appropriate set screws applied.    Next, a García Vickers type osteotomy was performed.  Bilateral facetectomies were performed of L4-L5.  A midline laminectomy was performed of L4.  This made the motion segment much more mobile.    A transforaminal lumbar interbody fusion was performed.  This was performed to the right side.  The exiting nerve root above was identified and the traversing nerve root medially were identified.  They were protected with protractors.  An anular window was created.  A complete discectomy was performed at L4-L5 using end plate zoran, curettes and pituitaries.  The end plates were laterally decorticated.  The disc space was copiously irrigated.   The appropriate size cage was 28 in length and 12 mm in height.  This is a DePuy/Synthes [place] interbody fusion cage.  Prior to placing this cage, I packed some bone graft in the anterior disc space.  The cage was then packed with bone graft.  It was impacted tangentially across the disc space and locked into place well.  It modestly distracted the disc space.  I then compressed across the screws to lock the cage in place.  The spondylolisthesis appeared to have reduced, as well as there was good restoration of lordosis.    A posterolateral fusion was performed.  The transverse processes on the left and right sides were decorticated.  Bone graft was packed in the right and left posterolateral gutters.    It should be noted that the bone graft was prepared from bone harvested locally from spinous processes and lamina.  It was mixed with Vivigen allograft.  This was the fusion material used.    A final copious irrigation undertaken of the wound.  The screws were torque tightened.  A deep Hemovac drain was placed.  The wound was closed in layers using Vicryl and skin staples.  A sterile dressing was applied.  The patient was awakened and transported to recovery room in stable condition.

## 2017-11-15 NOTE — PLAN OF CARE
Problem: Patient Care Overview (Adult)  Goal: Plan of Care Review  Outcome: Ongoing (interventions implemented as appropriate)    11/15/17 1118   Coping/Psychosocial Response Interventions   Plan Of Care Reviewed With patient;family   Patient Care Overview   Progress improving

## 2017-11-15 NOTE — ANESTHESIA PREPROCEDURE EVALUATION
Anesthesia Evaluation     Patient summary reviewed and Nursing notes reviewed   history of anesthetic complications: prolonged sedation  NPO Solid Status: > 8 hours  NPO Liquid Status: > 8 hours     Airway   Mallampati: II  TM distance: >3 FB  Neck ROM: full  no difficulty expected  Dental - normal exam     Pulmonary     breath sounds clear to auscultation  Cardiovascular     (+) hypertension,       Neuro/Psych  GI/Hepatic/Renal/Endo      Musculoskeletal     Abdominal    Substance History      OB/GYN          Other                                      Anesthesia Plan    ASA 2     general     intravenous induction   Anesthetic plan and risks discussed with patient.    Plan discussed with CRNA.

## 2017-11-15 NOTE — ANESTHESIA PROCEDURE NOTES
Airway  Urgency: elective    Date/Time: 11/15/2017 8:15 AM  Airway not difficult    General Information and Staff    Patient location during procedure: OR  Anesthesiologist: KATRIN BOBO  CRNA: AMY RAYMUNDO    Indications and Patient Condition  Indications for airway management: airway protection    Preoxygenated: yes  MILS not maintained throughout  Mask difficulty assessment: 1 - vent by mask    Final Airway Details  Final airway type: endotracheal airway      Successful airway: ETT  Cuffed: yes   Successful intubation technique: direct laryngoscopy  Facilitating devices/methods: Bougie  Endotracheal tube insertion site: oral  Blade: Hair  Blade size: #2  ETT size: 7.0 mm  Cormack-Lehane Classification: grade IIb - view of arytenoids or posterior of glottis only  Placement verified by: chest auscultation and capnometry   Measured from: lips  ETT to lips (cm): 20  Number of attempts at approach: 1    Additional Comments  Negative epigastric sounds, Breath sound equal bilaterally with symmetric chest rise and fall

## 2017-11-15 NOTE — INTERVAL H&P NOTE
Pre-Op H&P (See Recent Office Note Attached)  History and physical note from office (10/24/17-Gerald) reviewed and updated with the following, otherwise there are no changes in H&P:      Review of Systems:  General ROS:  no fever, chills, rashes, No change since last office visit  Cardiovascular ROS: no chest pain or dyspnea on exertion  Respiratory ROS: no cough, shortness of breath, or wheezing    Immunization History:   Influenza:  Yes    Pneumococcal:  Yes   Tetanus:  Unknown     Vital Signs:  /80 (BP Location: Right arm, Patient Position: Lying)  Pulse 89  Temp 98.4 °F (36.9 °C) (Temporal Artery )   Resp 16  SpO2 97%    Physical Exam:    CV:  S1S2 regular rate and rhythm, no murmur               Resp:  Clear to auscultation; respirations regular, even and unlabored    Results Review:    I reviewed the patient's new clinical results.    Assessment/Plan:   LUMBAR STENOSIS  /  LUMBAR DECOMPRESSION AND FUSION WITH INSTRUMENTATION      OSMANI Schulte  11/15/2017   6:50 AM

## 2017-11-16 PROBLEM — D62 ACUTE BLOOD LOSS ANEMIA: Status: ACTIVE | Noted: 2017-11-16

## 2017-11-16 PROBLEM — D72.829 LEUKOCYTOSIS: Status: ACTIVE | Noted: 2017-11-16

## 2017-11-16 LAB
ANION GAP SERPL CALCULATED.3IONS-SCNC: 6 MMOL/L (ref 3–11)
BUN BLD-MCNC: 16 MG/DL (ref 9–23)
BUN/CREAT SERPL: 22.9 (ref 7–25)
CALCIUM SPEC-SCNC: 8.5 MG/DL (ref 8.7–10.4)
CHLORIDE SERPL-SCNC: 106 MMOL/L (ref 99–109)
CO2 SERPL-SCNC: 25 MMOL/L (ref 20–31)
CREAT BLD-MCNC: 0.7 MG/DL (ref 0.6–1.3)
DEPRECATED RDW RBC AUTO: 40.3 FL (ref 37–54)
ERYTHROCYTE [DISTWIDTH] IN BLOOD BY AUTOMATED COUNT: 13.2 % (ref 11.3–14.5)
GFR SERPL CREATININE-BSD FRML MDRD: 82 ML/MIN/1.73
GLUCOSE BLD-MCNC: 133 MG/DL (ref 70–100)
HCT VFR BLD AUTO: 31.9 % (ref 34.5–44)
HGB BLD-MCNC: 10.8 G/DL (ref 11.5–15.5)
MCH RBC QN AUTO: 28.6 PG (ref 27–31)
MCHC RBC AUTO-ENTMCNC: 33.9 G/DL (ref 32–36)
MCV RBC AUTO: 84.4 FL (ref 80–99)
PLATELET # BLD AUTO: 244 10*3/MM3 (ref 150–450)
PMV BLD AUTO: 9.6 FL (ref 6–12)
POTASSIUM BLD-SCNC: 4.2 MMOL/L (ref 3.5–5.5)
RBC # BLD AUTO: 3.78 10*6/MM3 (ref 3.89–5.14)
SODIUM BLD-SCNC: 137 MMOL/L (ref 132–146)
WBC NRBC COR # BLD: 15.28 10*3/MM3 (ref 3.5–10.8)

## 2017-11-16 PROCEDURE — 97166 OT EVAL MOD COMPLEX 45 MIN: CPT | Performed by: OCCUPATIONAL THERAPIST

## 2017-11-16 PROCEDURE — 80048 BASIC METABOLIC PNL TOTAL CA: CPT | Performed by: NURSE PRACTITIONER

## 2017-11-16 PROCEDURE — 85027 COMPLETE CBC AUTOMATED: CPT | Performed by: NURSE PRACTITIONER

## 2017-11-16 PROCEDURE — 97116 GAIT TRAINING THERAPY: CPT

## 2017-11-16 PROCEDURE — 97530 THERAPEUTIC ACTIVITIES: CPT | Performed by: OCCUPATIONAL THERAPIST

## 2017-11-16 PROCEDURE — 97162 PT EVAL MOD COMPLEX 30 MIN: CPT

## 2017-11-16 PROCEDURE — 25810000003 SODIUM CHLORIDE 0.9 % WITH KCL 20 MEQ 20-0.9 MEQ/L-% SOLUTION: Performed by: ORTHOPAEDIC SURGERY

## 2017-11-16 RX ADMIN — FAMOTIDINE 20 MG: 20 TABLET, FILM COATED ORAL at 20:12

## 2017-11-16 RX ADMIN — HYDROCODONE BITARTRATE AND ACETAMINOPHEN 1 TABLET: 7.5; 325 TABLET ORAL at 04:26

## 2017-11-16 RX ADMIN — FAMOTIDINE 20 MG: 20 TABLET, FILM COATED ORAL at 08:57

## 2017-11-16 RX ADMIN — HYDROCODONE BITARTRATE AND ACETAMINOPHEN 1 TABLET: 7.5; 325 TABLET ORAL at 10:06

## 2017-11-16 RX ADMIN — POTASSIUM CHLORIDE AND SODIUM CHLORIDE 100 ML/HR: 900; 150 INJECTION, SOLUTION INTRAVENOUS at 02:17

## 2017-11-16 RX ADMIN — AMLODIPINE BESYLATE 5 MG: 5 TABLET ORAL at 08:57

## 2017-11-16 RX ADMIN — HYDROCODONE BITARTRATE AND ACETAMINOPHEN 1 TABLET: 7.5; 325 TABLET ORAL at 15:45

## 2017-11-16 NOTE — PROGRESS NOTES
"Ortho Spine Progress Note     LOS: 1 day   Patient Care Team:  Pam Reid MD as PCP - General (Internal Medicine)    Subjective: No complaints.  Leg pain gone.    Objective:   Vital Signs:  /63 (BP Location: Left arm, Patient Position: Lying)  Pulse 80  Temp 97.9 °F (36.6 °C) (Oral)   Resp 16  Ht 61\" (154.9 cm)  Wt 152 lb 1.9 oz (69 kg)  SpO2 99%  BMI 28.74 kg/m2    Labs:  Lab Results (last 24 hours)     Procedure Component Value Units Date/Time    CBC (No Diff) [[id number]]  (Abnormal) Collected:  11/16/17 0527    Specimen:  Blood Updated:  11/16/17 0615     WBC 15.28 (H) 10*3/mm3      RBC 3.78 (L) 10*6/mm3      Hemoglobin 10.8 (L) g/dL      Hematocrit 31.9 (L) %      MCV 84.4 fL      MCH 28.6 pg      MCHC 33.9 g/dL      RDW 13.2 %      RDW-SD 40.3 fl      MPV 9.6 fL      Platelets 244 10*3/mm3     Basic Metabolic Panel [[id number]]  (Abnormal) Collected:  11/16/17 0527    Specimen:  Blood Updated:  11/16/17 0634     Glucose 133 (H) mg/dL      BUN 16 mg/dL      Creatinine 0.70 mg/dL      Sodium 137 mmol/L      Potassium 4.2 mmol/L      Chloride 106 mmol/L      CO2 25.0 mmol/L      Calcium 8.5 (L) mg/dL      eGFR Non African Amer 82 mL/min/1.73      BUN/Creatinine Ratio 22.9     Anion Gap 6.0 mmol/L     Narrative:       [employer] Guidelines    Stage     Description        GFR  1         Normal or High     90+  2         Mild decrease      60-89  3         Moderate decrease  30-59  4         Severe decrease    15-29  5         Kidney failure     <15          Awake, alert, oriented.  Motor intact in lower extremities.    Assessment/Plan: Doing well.  Continue physical therapy/mobilization.  Patient wants to go to rehabilitation hospital.  She says her  is 89 years old and can't help her at home.    Micah Duarte MD  11/16/17  8:29 AM      "

## 2017-11-16 NOTE — PLAN OF CARE
Problem: Patient Care Overview (Adult)  Goal: Plan of Care Review  Outcome: Ongoing (interventions implemented as appropriate)    11/16/17 1431   Coping/Psychosocial Response Interventions   Plan Of Care Reviewed With patient   Outcome Evaluation   Outcome Summary/Follow up Plan Pt s/p spinal sx and now with minimal pain. Min vc'ing for safety with dressing tasks, mobility & maintaining spinal precautions. Pt desires rehab stay d/t poor home support.          Problem: Inpatient Occupational Therapy  Goal: Transfer Training Goal 1 LTG- OT  Outcome: Ongoing (interventions implemented as appropriate)    11/16/17 1431   Transfer Training OT LTG   Transfer Training OT LTG, Time to Achieve 3 days   Transfer Training OT LTG, Activity Type tub;toilet   Transfer Training OT LTG, Crane Level contact guard assist   Transfer Training OT LTG, Assist Device walker, rolling   Transfer Training OT LTG, Outcome goal ongoing       Goal: Toileting Goal LTG- OT  Outcome: Ongoing (interventions implemented as appropriate)    11/16/17 1431   Toileting OT LTG   Toileting Goal OT LTG, Time to Achieve 3 days   Toileting Goal OT LTG, Crane Level supervision required   Toileting Goal OT LTG, Outcome goal ongoing       Goal: LB Dressing Goal LTG- OT  Outcome: Ongoing (interventions implemented as appropriate)    11/16/17 1431   LB Dressing OT LTG   LB Dressing Goal OT LTG, Time to Achieve 3 days   LB Dressing Goal OT LTG, Crane Level supervision required   LB Dressing Goal OT LTG, Adaptive Equipment reacher;shoe horn, long handled;sock-aid   LB Dressing Goal OT LTG, Outcome goal ongoing

## 2017-11-16 NOTE — PLAN OF CARE
Problem: Patient Care Overview (Adult)  Goal: Plan of Care Review  Outcome: Ongoing (interventions implemented as appropriate)    11/16/17 1433   Coping/Psychosocial Response Interventions   Plan Of Care Reviewed With patient   Outcome Evaluation   Outcome Summary/Follow up Plan Pt ambulated 180 feet with CGA and RW, limited by fatigue. PT will follow to increase strength and progress functional mobility with back precautions. Plan is rehab at discharge as  unable to physically assist         Problem: Inpatient Physical Therapy  Goal: Bed Mobility Goal LTG- PT  Outcome: Ongoing (interventions implemented as appropriate)    11/16/17 1433   Bed Mobility PT LTG   Bed Mobility PT LTG, Date Established 11/16/17   Bed Mobility PT LTG, Time to Achieve 5 days   Bed Mobility PT LTG, Activity Type supine to sit/sit to supine  (log roll)   Bed Mobility PT LTG, [place] Level conditional independence   Bed Mobility PT LTG, Date Goal Reviewed 11/16/17   Bed Mobility PT LTG, Outcome goal ongoing       Goal: Transfer Training Goal 1 LTG- PT  Outcome: Ongoing (interventions implemented as appropriate)    11/16/17 1433   Transfer Training PT LTG   Transfer Training PT LTG, Date Established 11/16/17   Transfer Training PT LTG, Time to Achieve 5 days   Transfer Training PT LTG, Activity Type sit to stand/stand to sit   Transfer Training PT LTG, [place] Level conditional independence   Transfer Training PT LTG, Assist Device walker, rolling   Transfer Training PT LTG, Date Goal Reviewed 11/16/17   Transfer Training PT LTG, Outcome goal ongoing       Goal: Gait Training Goal LTG- PT  Outcome: Ongoing (interventions implemented as appropriate)    11/16/17 1433   Gait Training PT LTG   Gait Training Goal PT LTG, Date Established 11/16/17   Gait Training Goal PT LTG, Time to Achieve 5 days   Gait Training Goal PT LTG, [place] Level conditional independence   Gait Training Goal PT LTG, Assist Device walker, rolling    Gait Training Goal PT LTG, Distance to Achieve 350 feet   Gait Training Goal PT LTG, Date Goal Reviewed 11/16/17   Gait Training Goal PT LTG, Outcome goal ongoing

## 2017-11-16 NOTE — PROGRESS NOTES
Discharge Planning Assessment  [facility]     Patient Name: Sarai Hardy  MRN: [id number]  Today's Date: 11/16/2017    Admit Date: 11/15/2017          Discharge Needs Assessment       11/16/17 1500    Living Environment    Lives With spouse    Living Arrangements house    Home Accessibility bed and bath on same level    Transportation Available car;family or friend will provide    Living Environment    Quality Of Family Relationships supportive;helpful;involved    Able to Return to Prior Living Arrangements yes    Living Arrangement Comments Ms. Hardy lives with her  in a one story home in [place] [facility].  Her  is 90 years old and is limited in his abiltiy to assist.  She does have other family that live nearby that can assist her as needed at home.    Discharge Needs Assessment    Readmission Within The Last 30 Days no previous admission in last 30 days    Equipment Currently Used at Home none    Equipment Needed After Discharge walker, rolling;commode    Discharge Facility/Level Of Care Needs rehabilitation facility    Discharge Disposition rehab facility    Discharge Contact Information if Applicable , John,  [phone]            Discharge Plan       11/16/17 9051    Case Management/Social Work Plan    Plan [facility]    Patient/Family In Agreement With Plan yes    Additional Comments Met with Ms. Hardy at the bedside to discuss discharge planning.  Prior to admission, Ms. Hardy was independent with her ADL's.  No DME in the home.  She is currently ambulating with a rolling walker.  Requested a rolling walker and bedside commode and did not have a preference for provider.  Contacted [facility] and they will be delivering the DME to the patient's hospital room today.  Discussed inpatient rehab and Ms. Hardy requested a referral to [facility].  Referred to Erika at [facility].  Ms. Hardy's  will be driving her to the facility when transferring.  Ms. Hardy said that she is not  interested in going to a SNF if [facility] does not have any beds available.  CM will follow up.        Discharge Placement     No information found        Expected Discharge Date and Time     Expected Discharge Date Expected Discharge Time    Nov 17, 2017               Demographic Summary       11/16/17 1452    Referral Information    Admission Type inpatient    Arrived From home or self-care    Reason For Consult discharge planning    Record Reviewed clinical discipline documentation;history and physical;medical record    Contact Information    Permission Granted to Share Information With     Primary Care Physician Information    Name Pam Reid MD            Functional Status       11/16/17 7949    Functional Status Prior    Ambulation 0-->independent    Transferring 0-->independent    Toileting 0-->independent    Bathing 0-->independent    Dressing 0-->independent    Eating 0-->independent    Communication 0-->understands/communicates without difficulty    Swallowing 0-->swallows foods/liquids without difficulty    IADL    Medications independent    Meal Preparation independent    Housekeeping independent    Laundry independent    Shopping independent    Oral Care independent    Cognitive/Perceptual/Developmental    Current Mental Status/Cognitive Functioning no deficits noted    Employment/Financial    Employment/Finance Comments Ms. Hardy has prescription drug coverage with Medicare, AARP and Med D plans.  Verified insurance with patient.  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            Psychosocial     None            Abuse/Neglect     None            Legal       11/16/17 1459    Legal    Legal Comments No advance directives.            Substance Abuse       11/16/17 1500    Substance Use, Patient    Substance Use current tobacco use;current alcohol use    Tobacco Type chewing tobacco            Patient Forms     None          Hayley Corey

## 2017-11-16 NOTE — THERAPY EVALUATION
Acute Care - Occupational Therapy Initial Evaluation  [facility]     Patient Name: Sarai Hardy  : 1945  MRN: [id number]  Today's Date: 2017  Onset of Illness/Injury or Date of Surgery Date: 11/15/17  Date of Referral to OT: 17  Referring Physician: MD Gerald    Admit Date: 11/15/2017       ICD-10-CM ICD-9-CM   1. Impaired mobility and ADLs Z74.09 799.89   2. Impaired functional mobility, balance, gait, and endurance Z74.09 V49.89     Patient Active Problem List   Diagnosis   • Back pain   • S/P lumbar fusion   • HTN (hypertension)   • GERD (gastroesophageal reflux disease)   • Leukocytosis, likely reactive   • Acute blood loss anemia, mild, asymptomatic     Past Medical History:   Diagnosis Date   • Arthritis    • Back pain    • Hypertension    • Wears glasses      Past Surgical History:   Procedure Laterality Date   • BLADDER SURGERY     • COLONOSCOPY     • HYSTERECTOMY     • LUMBAR DISCECTOMY FUSION INSTRUMENTATION N/A 11/15/2017    Procedure: LUMBAR DECOMPRESSION AND FUSION WITH INSTRUMENTATION;  Surgeon: Micah Duarte MD;  Location: [facility];  Service:           OT ASSESSMENT FLOWSHEET (last 72 hours)      OT Evaluation       17 1306 17 1305 11/15/17 1713 11/15/17 1520 11/15/17 0636    Rehab Evaluation    Document Type evaluation  -MJ evaluation;therapy note (daily note)  -ST       Subjective Information agree to therapy;no complaints  -MJ no complaints;agree to therapy  -ST       Evaluation Not Performed   patient/family decline, not feeling well   Patient seemed agreeable but family refused d/t patient's pain level. Will check back in AM to complete eval.   -LR      Patient Effort, Rehab Treatment good  -MJ good  -ST       Symptoms Noted During/After Treatment none  -MJ none  -ST       General Information    Patient Profile Review yes  -MJ yes  -ST       Onset of Illness/Injury or Date of Surgery Date 11/15/17  -MJ 11/15/17  -ST       Referring Physician MD Gerald   -MJ MD Gerald  -ST       General Observations Pt supine in bed, IV, hemovac.  - supine upon arrival; hemovac intact  -ST       Pertinent History Of Current Problem Pt presents for surgical management of back pain and dysfunction that failed to improve with conservative management  - Pt presents for sx management of long-standing back pain with stenosis and spondylolisthesis; now s/p transforaminal lumbar fusion L4-5. Pt's symptoms failed to improve with conservative measures and impacted her ability to complete daily tasks such as dressing and mobility  -ST       Precautions/Limitations fall precautions;spinal precautions;other (see comments)   hemovac  - fall precautions;spinal precautions   hemovac  -ST       Prior Level of Function min assist:;all household mobility;community mobility;gait;transfer;bed mobility   Pain increased with activity  - independent:;all household mobility;transfer;feeding;grooming;min assist:;community mobility;dressing;bathing;home management   unable to tolerate standing/difficulty w/walking/LBD  -ST       Equipment Currently Used at Home none  -MJ none  -ST   none  -LB    Plans/Goals Discussed With patient;agreed upon  -MJ patient;agreed upon  -ST       Risks Reviewed patient:;LOB;increased discomfort  - patient:;dizziness;LOB;nausea/vomiting;increased discomfort;change in vital signs  -ST       Benefits Reviewed patient:;improve function;increase independence;increase strength;increase balance;decrease pain  - patient:;improve function;increase independence;increase strength;increase balance;decrease pain;increase knowledge  -ST       Barriers to Rehab none identified  - previous functional deficit  -ST       Living Environment    Lives With spouse  -MJ spouse  -ST   spouse  -LB    Living Arrangements house  -MJ house  -ST   house  -LB    Home Accessibility bed and bath on same level;tub/shower is not walk in  -MJ bed and bath on same level;tub/shower is not walk in   -ST   bed and bath on same level;no concerns;stairs within home  -LB    Living Environment Comment Pt unable to provide assist at discharge  -MJ rehab plan d/t no assist at home ( unable to assist)   -ST       Clinical Impression    Date of Referral to OT  11/16/17  -ST       OT Diagnosis  impaired ADLs  -ST       Prognosis  good  -ST       Patient/Family Goals Statement  return home  -ST       Criteria for Skilled Therapeutic Interventions Met  yes  -ST       Rehab Potential  good, to achieve stated therapy goals  -ST       Therapy Frequency  daily  -ST       Anticipated Equipment Needs At Discharge  walker   TBD   -ST       Anticipated Discharge Disposition  inpatient rehabilitation facility  -ST       Functional Level Prior    Ambulation    0-->independent  -JA 0-->independent  -LB    Transferring    0-->independent  -JA 0-->independent  -LB    Toileting    0-->independent  -JA 0-->independent  -LB    Bathing    0-->independent  -JA 0-->independent  -LB    Dressing    0-->independent  -JA 0-->independent  -LB    Eating    0-->independent  -JA 0-->independent  -LB    Communication    0-->understands/communicates without difficulty  -JA 0-->understands/communicates without difficulty  -LB    Swallowing    0-->swallows foods/liquids without difficulty  -JA 0-->swallows foods/liquids without difficulty  -LB    Prior Functional Level Comment    independent  -JA     Pain Assessment    Pain Assessment 0-10  -MJ 0-10  -ST       Pain Score 5  -MJ 5  -ST       Post Pain Score 5  -MJ 3  -ST       Pain Type Acute pain  -MJ Acute pain  -ST       Pain Location Back  -MJ Back  -ST       Pain Intervention(s) Repositioned;Ambulation/increased activity  -MJ Repositioned;Ambulation/increased activity  -ST       Vision Assessment/Intervention    Visual Impairment  WNL  -ST       Cognitive Assessment/Intervention    Current Cognitive/Communication Assessment functional  -MJ functional   difficult to understand speech at times   -ST       Orientation Status oriented x 4  -MJ oriented x 4  -ST       Follows Commands/Answers Questions 100% of the time;able to follow multi-step instructions;needs cueing  - 75% of the time  -       Personal Safety WNL/WFL  - mild impairment  -       Personal Safety Interventions  fall prevention program maintained;gait belt;nonskid shoes/slippers when out of bed  -ST       ROM (Range of Motion)    General ROM no range of motion deficits identified  - no range of motion deficits identified  -       General ROM Detail for B LE; defer UE to OT  -        MMT (Manual Muscle Testing)    General MMT Assessment lower extremity strength deficits identified  -        General MMT Assessment Detail  fxnl for ADL performance  -       Bed Mobility, Assessment/Treatment    Bed Mobility, Assistive Device bed rails;head of bed elevated  - bed rails;head of bed elevated  -       Bed Mobility, Scoot/Bridge, [place]  independent  -       Bed Mob, Supine to Sit, [place] minimum assist (75% patient effort);verbal cues required  - supervision required  -       Bed Mob, Sit to Supine, [place] not tested   Pt UIC  -        Bed Mobility, Safety Issues decreased use of legs for bridging/pushing  -        Bed Mobility, Impairments strength decreased;pain  -        Bed Mobility, Comment Pt performed log roll out of bed, verbal cues for sequencing, assist with trunk  - completed correct log roll s/p education  -ST       Transfer Assessment/Treatment    Transfers, Sit-Stand [place] contact guard assist;2 person assist required;verbal cues required  - contact guard assist  -       Transfers, Stand-Sit [place] contact guard assist;2 person assist required;verbal cues required  - contact guard assist  -       Transfers, Sit-Stand-Sit, Assist Device rolling walker  - rolling walker  -       Transfer, Safety Issues step length decreased;weight-shifting ability decreased  -         Transfer, Impairments strength decreased;pain  -MJ        Transfer, Comment Verbal cues for correct hand placement  - cuing to scoot to edge of surface and push from surface for standing   -       Functional Mobility    Functional Mobility- Ind. Level  contact guard assist;2 person assist required  -       Functional Mobility- Device  rolling walker  -ST       Functional Mobility-Distance (Feet)  100  -ST       Functional Mobility- Comment  cuing for posture, good negotiation of objects in hallway   -       Upper Body Dressing Assessment/Training    UB Dressing Assess/Train, Clothing Type  doffing:;donning:;hospital gown;pull over;t-shirt  -       UB Dressing Assess/Train, Position  sitting  -       UB Dressing Assess/Train, [place]  supervision required  -       Lower Body Dressing Assessment/Training    LB Dressing Assess/Train, Clothing Type  doffing:;donning:;pants;slipper socks  -       LB Dressing Assess/Train, Assist Device  reacher;sock-aid  -ST       LB Dressing Assess/Train, Position  sitting;standing  -ST       LB Dressing Assess/Train, [place]  supervision required  -       LB Dressing Assess/Train, Impairments  decreased flexibility;ROM decreased   spinal precautions  -       Therapy Exercises    Bilateral Lower Extremities AROM:;10 reps;ankle pumps/circles;glut sets;quad sets;hip IR;hip ER   abdominal sets  -MJ        Sensory Assessment/Intervention    Light Touch LLE;RLE   denies numbness/tingling; can actively DF both ankles  - LUE;RUE  -ST       LUE Light Touch  WNL  -ST       RUE Light Touch  WNL  -ST       LLE Light Touch WNL  -MJ        RLE Light Touch WNL  -MJ        General Therapy Interventions    Planned Therapy Interventions  adaptive equipment training;ADL retraining;balance training;bed mobility training;transfer training  -       Adaptive Equipment Training  completed AE training   -       Positioning and Restraints    Pre-Treatment Position in  bed  -MJ in bed  -ST       Post Treatment Position chair  -MJ chair  -ST       In Chair notified nsg;reclined;call light within reach;encouraged to call for assist;with OT  -MJ notified nsg;reclined;call light within reach;encouraged to call for assist;exit alarm on;legs elevated  -ST       Lower Extremity    Lower Ext Manual Muscle Testing Detail B LE grossly 4+/5  -MJ          User Key  (r) = Recorded By, (t) = Taken By, (c) = Cosigned By    Initials Name Effective Dates    ST Lorena Parikh, OTR 02/20/17 -     LR Khushboo Aviles, PT 06/19/15 -     DIEGO Patricia, RN 06/16/16 -     MJ Tristen Patel, PT 03/14/16 -     DEBORAH Bell, RN 06/16/16 -            Occupational Therapy Education     Title: PT OT SLP Therapies (Done)     Topic: Occupational Therapy (Done)     Point: ADL training (Done)    Description: Instruct learner(s) on proper safety adaptation and remediation techniques during self care or transfers.   Instruct in proper use of assistive devices.    Learning Progress Summary    Learner Readiness Method Response Comment Documented by Status   Patient Acceptance E,TB,LLOYD IGNACIO DU role of OT, benefits of activity, LBD, AE, spinal precautions, log roll ST 11/16/17 1430 Done               Point: Home exercise program (Done)    Description: Instruct learner(s) on appropriate technique for monitoring, assisting and/or progressing therapeutic exercises/activities.    Learning Progress Summary    Learner Readiness Method Response Comment Documented by Status   Patient Acceptance E,TB,MATEUS,JU MAGAÑA role of OT, benefits of activity, LBD, AE, spinal precautions, log roll ST 11/16/17 1430 Done               Point: Precautions (Done)    Description: Instruct learner(s) on prescribed precautions during self-care and functional transfers.    Learning Progress Summary    Learner Readiness Method Response Comment Documented by Status   Patient Acceptance E,TB,MATEUS,H JU FUENTES role of OT, benefits of activity, LBD, AE,  spinal precautions, log roll ST 11/16/17 1430 Done               Point: Body mechanics (Done)    Description: Instruct learner(s) on proper positioning and spine alignment during self-care, functional mobility activities and/or exercises.    Learning Progress Summary    Learner Readiness Method Response Comment Documented by Status   Patient Acceptance E,TB,D,H VU,DU role of OT, benefits of activity, LBD, AE, spinal precautions, log roll ST 11/16/17 1430 Done                      User Key     Initials Effective Dates Name Provider Type Discipline    ST 02/20/17 -  Lorena Parikh OTJENNA Occupational Therapist OT                  OT Recommendation and Plan  Anticipated Equipment Needs At Discharge: walker (TBD )  Anticipated Discharge Disposition: inpatient rehabilitation facility  Planned Therapy Interventions: adaptive equipment training, ADL retraining, balance training, bed mobility training, transfer training  Therapy Frequency: daily  Plan of Care Review  Plan Of Care Reviewed With: patient  Outcome Summary/Follow up Plan: Pt s/p spinal sx and now with minimal pain. Min vc'ing for safety with dressing tasks, mobility & maintaining spinal precautions. Pt desires rehab stay d/t poor home support.           OT Goals       11/16/17 1431          Transfer Training OT LTG    Transfer Training OT LTG, Time to Achieve 3 days  -ST      Transfer Training OT LTG, Activity Type tub;toilet  -ST      Transfer Training OT LTG, [place] Level contact guard assist  -ST      Transfer Training OT LTG, Assist Device walker, rolling  -ST      Transfer Training OT LTG, Outcome goal ongoing  -ST      Toileting OT LTG    Toileting Goal OT LTG, Time to Achieve 3 days  -ST      Toileting Goal OT LTG, [place] Level supervision required  -ST      Toileting Goal OT LTG, Outcome goal ongoing  -ST      LB Dressing OT LTG    LB Dressing Goal OT LTG, Time to Achieve 3 days  -ST      LB Dressing Goal OT LTG, [place] Level  supervision required  -ST      LB Dressing Goal OT LTG, Adaptive Equipment reacher;shoe horn, long handled;sock-aid  -ST      LB Dressing Goal OT LTG, Outcome goal ongoing  -ST        User Key  (r) = Recorded By, (t) = Taken By, (c) = Cosigned By    Initials Name Provider Type    ECTOR Jones Occupational Therapist                Outcome Measures       11/16/17 1306 11/16/17 1305       How much help from another person do you currently need...    Turning from your back to your side while in flat bed without using bedrails? 3  -MJ      Moving from lying on back to sitting on the side of a flat bed without bedrails? 3  -MJ      Moving to and from a bed to a chair (including a wheelchair)? 3  -MJ      Standing up from a chair using your arms (e.g., wheelchair, bedside chair)? 3  -MJ      Climbing 3-5 steps with a railing? 3  -MJ      To walk in hospital room? 3  -MJ      AM-PAC 6 Clicks Score 18  -MJ      How much help from another is currently needed...    Putting on and taking off regular lower body clothing?  3  -ST     Bathing (including washing, rinsing, and drying)  3  -ST     Toileting (which includes using toilet bed pan or urinal)  3  -ST     Putting on and taking off regular upper body clothing  4  -ST     Taking care of personal grooming (such as brushing teeth)  4  -ST     Eating meals  4  -ST     Score  21  -ST     Functional Assessment    Outcome Measure Options AM-PAC 6 Clicks Basic Mobility (PT)  -MJ AM-PAC 6 Clicks Daily Activity (OT)  -ST       User Key  (r) = Recorded By, (t) = Taken By, (c) = Cosigned By    Initials Name Provider Type    ECTOR Jones Occupational Therapist    STEVEN Patel, PT Physical Therapist          Time Calculation:   OT Start Time: 1305    Therapy Charges for Today     Code Description Service Date Service Provider Modifiers Qty    [id number]  OT THERAPEUTIC ACT EA 15 MIN 11/16/2017 ECTOR Triana GO 2    [id number]  OT EVAL MOD  COMPLEXITY 2 11/16/2017 Lorena Parikh, OTR GO 1               Lorena Parikh, ECTOR  11/16/2017

## 2017-11-16 NOTE — PROGRESS NOTES
"IM progress note      Sarai Hardy  [id number]  1945     LOS: 1 day     Attending: Micah Duarte MD    Primary Care Provider: Pam Reid MD      Chief Complaint/Reason for visit:  Back pain    Subjective   Doing well. Good pain control, denies numbness, tingling or pain BLE. Denies f/c/n/v/sob/cp.  (ambulated to bathroom today already. Good progress)wy    Objective     Vital Signs  Blood pressure 133/63, pulse 80, temperature 97.9 °F (36.6 °C), temperature source Oral, resp. rate 16, height 61\" (154.9 cm), weight 152 lb 1.9 oz (69 kg), SpO2 99 %, not currently breastfeeding.  Temp (24hrs), Av.7 °F (36.5 °C), Min:97 °F (36.1 °C), Max:98.6 °F (37 °C)      Nutrition: PO    Respiratory: RA    Physical Therapy:     Physical Exam:     General Appearance:    Alert, cooperative, in no acute distress   Head:    Normocephalic, without obvious abnormality, atraumatic    Lungs:     Normal effort, symmetric chest rise, no crepitus, clear to      auscultation bilaterally             Heart:    Irregular rhythm and normal rate, normal S1 and S2, 2/6 murmur   Abdomen:     Normal bowel sounds, no masses, no organomegaly, soft        non-tender, non-distended, no guarding, no rebound                tenderness   Extremities:   No clubbing, cyanosis or edema.  No deformities.    Pulses:   Pulses palpable and equal bilaterally   Skin:   No bleeding, bruising or rash. Back dressing CDI. HV present   Neurologic:   Moves all extremities with no obvious focal motor deficit.  Cranial nerves 2 - 12 grossly intact. Flexion and dorsiflexion intact bilateral feet.       Results Review:     I reviewed the patient's new clinical results.     Results from last 7 days  Lab Units 17  0527 11/10/17  1343   WBC 10*3/mm3 15.28* 6.95   HEMOGLOBIN g/dL 10.8* 13.5   HEMATOCRIT % 31.9* 39.1   PLATELETS 10*3/mm3 244 298       Results from last 7 days  Lab Units 17  0527   SODIUM mmol/L 137   POTASSIUM mmol/L 4.2   CHLORIDE mmol/L " 106   CO2 mmol/L 25.0   BUN mg/dL 16   CREATININE mg/dL 0.70   CALCIUM mg/dL 8.5*   GLUCOSE mg/dL 133*     I reviewed the patient's new imaging including images and reports.    All medications reviewed.     amLODIPine 5 mg Oral Daily   famotidine 20 mg Intravenous Q12H   Or      famotidine 20 mg Oral Q12H   famotidine 20 mg Oral Daily       Assessment/Plan   Principal Problem:    S/P lumbar fusion  Active Problems:    Back pain    HTN (hypertension)    GERD (gastroesophageal reflux disease)    Leukocytosis, likely reactive    Acute blood loss anemia, mild, asymptomatic      Plan  1. PT/OT- ambulate  2. Pain control-prns   3. IS-encouraged  [facility]  5. Bowel regimen  6. Monitor post-op labs  7. DC planning for [facility]    HTN  - Continue home norvasc  - Monitor BP q4 hrs  - Holding parameters for BP meds  - PRN hydralazine for SBP >170     GERD  - Continue home H2 blocker    Seen and examined by me. Agree with above. Discussed with patient and her .marshall Peterson MD  11/16/17  12:28 PM

## 2017-11-16 NOTE — THERAPY EVALUATION
Acute Care - Physical Therapy Initial Evaluation  [facility]     Patient Name: Sarai Hardy  : 1945  MRN: [id number]  Today's Date: 2017   Onset of Illness/Injury or Date of Surgery Date: 11/15/17  Date of Referral to PT: 11/15/17  Referring Physician: MD Gerald      Admit Date: 11/15/2017     Visit Dx:    ICD-10-CM ICD-9-CM   1. Impaired mobility and ADLs Z74.09 799.89   2. Impaired functional mobility, balance, gait, and endurance Z74.09 V49.89     Patient Active Problem List   Diagnosis   • Back pain   • S/P lumbar fusion   • HTN (hypertension)   • GERD (gastroesophageal reflux disease)   • Leukocytosis, likely reactive   • Acute blood loss anemia, mild, asymptomatic     Past Medical History:   Diagnosis Date   • Arthritis    • Back pain    • Hypertension    • Wears glasses      Past Surgical History:   Procedure Laterality Date   • BLADDER SURGERY     • COLONOSCOPY     • HYSTERECTOMY     • LUMBAR DISCECTOMY FUSION INSTRUMENTATION N/A 11/15/2017    Procedure: LUMBAR DECOMPRESSION AND FUSION WITH INSTRUMENTATION;  Surgeon: Micah Duarte MD;  Location: [facility];  Service:           PT ASSESSMENT (last 72 hours)      PT Evaluation       17 1306 17 1305    Rehab Evaluation    Document Type evaluation  - evaluation;therapy note (daily note)  -ST    Subjective Information agree to therapy;no complaints  -MJ no complaints;agree to therapy  -ST    Patient Effort, Rehab Treatment good  -MJ good  -ST    Symptoms Noted During/After Treatment none  -MJ none  -ST    General Information    Patient Profile Review yes  -MJ yes  -ST    Onset of Illness/Injury or Date of Surgery Date 11/15/17  -MJ 11/15/17  -ST    Referring Physician MD Gerald  -STEVEN Duarte MD  -ST    General Observations Pt supine in bed, IV, hemovac.  -MJ supine upon arrival; hemovac intact  -ST    Pertinent History Of Current Problem Pt presents for surgical management of back pain and dysfunction that failed to improve  with conservative management  - Pt presents for sx management of long-standing back pain with stenosis and spondylolisthesis; now s/p transforaminal lumbar fusion L4-5. Pt's symptoms failed to improve with conservative measures and impacted her ability to complete daily tasks such as dressing and mobility  -    Precautions/Limitations fall precautions;spinal precautions;other (see comments)   hemovac  -MJ fall precautions;spinal precautions   hemovac  -ST    Prior Level of Function min assist:;all household mobility;community mobility;gait;transfer;bed mobility   Pain increased with activity  - independent:;all household mobility;transfer;feeding;grooming;min assist:;community mobility;dressing;bathing;home management   unable to tolerate standing/difficulty w/walking/LBD  -ST    Equipment Currently Used at Home none  - none  -ST    Plans/Goals Discussed With patient;agreed upon  - patient;agreed upon  -    Risks Reviewed patient:;LOB;increased discomfort  - patient:;dizziness;LOB;nausea/vomiting;increased discomfort;change in vital signs  -ST    Benefits Reviewed patient:;improve function;increase independence;increase strength;increase balance;decrease pain  - patient:;improve function;increase independence;increase strength;increase balance;decrease pain;increase knowledge  -ST    Barriers to Rehab none identified  - previous functional deficit  -ST    Living Environment    Lives With spouse  - spouse  -ST    Living Arrangements house  - house  -ST    Home Accessibility bed and bath on same level;tub/shower is not walk in  - bed and bath on same level;tub/shower is not walk in  -ST    Living Environment Comment Pt unable to provide assist at discharge  - rehab plan d/t no assist at home ( unable to assist)   -ST    Clinical Impression    Date of Referral to PT 11/15/17  -     PT Diagnosis s/p L4-5 fusion  -     Criteria for Skilled Therapeutic Interventions Met yes;treatment  indicated  -MJ     Pain Assessment    Pain Assessment 0-10  -MJ 0-10  -ST    Pain Score 5  -MJ 5  -ST    Post Pain Score 5  -MJ 3  -ST    Pain Type Acute pain  -MJ Acute pain  -ST    Pain Location Back  -MJ Back  -ST    Pain Intervention(s) Repositioned;Ambulation/increased activity  -MJ Repositioned;Ambulation/increased activity  -ST    Vision Assessment/Intervention    Visual Impairment  WNL  -ST    Cognitive Assessment/Intervention    Current Cognitive/Communication Assessment functional  -MJ functional   difficult to understand speech at times  -ST    Orientation Status oriented x 4  -MJ oriented x 4  -ST    Follows Commands/Answers Questions 100% of the time;able to follow multi-step instructions;needs cueing  - 75% of the time  -ST    Personal Safety WNL/WFL  -MJ mild impairment  -ST    Personal Safety Interventions  fall prevention program maintained;gait belt;nonskid shoes/slippers when out of bed  -ST    ROM (Range of Motion)    General ROM no range of motion deficits identified  - no range of motion deficits identified  -ST    General ROM Detail for B LE; defer UE to OT  -     MMT (Manual Muscle Testing)    General MMT Assessment lower extremity strength deficits identified  -     General MMT Assessment Detail  fxnl for ADL performance  -    Lower Extremity    Lower Ext Manual Muscle Testing Detail B LE grossly 4+/5  -MJ     Bed Mobility, Assessment/Treatment    Bed Mobility, Assistive Device bed rails;head of bed elevated  - bed rails;head of bed elevated  -    Bed Mobility, Scoot/Bridge, [place]  independent  -ST    Bed Mob, Supine to Sit, [place] minimum assist (75% patient effort);verbal cues required  - supervision required  -    Bed Mob, Sit to Supine, [place] not tested   Pt UIC  -MJ     Bed Mobility, Safety Issues decreased use of legs for bridging/pushing  -     Bed Mobility, Impairments strength decreased;pain  -MJ     Bed Mobility, Comment Pt performed log roll  out of bed, verbal cues for sequencing, assist with trunk  - completed correct log roll s/p education  -ST    Transfer Assessment/Treatment    Transfers, Sit-Stand [place] contact guard assist;2 person assist required;verbal cues required  -MJ contact guard assist  -ST    Transfers, Stand-Sit [place] contact guard assist;2 person assist required;verbal cues required  -MJ contact guard assist  -ST    Transfers, Sit-Stand-Sit, Assist Device rolling walker  -MJ rolling walker  -ST    Transfer, Safety Issues step length decreased;weight-shifting ability decreased  -     Transfer, Impairments strength decreased;pain  -MJ     Transfer, Comment Verbal cues for correct hand placement  - cuing to scoot to edge of surface and push from surface for standing   -ST    Gait Assessment/Treatment    Gait, [place] Level contact guard assist;verbal cues required  -     Gait, Assistive Device rolling walker  -MJ     Gait, Distance (Feet) 180  -MJ     Gait, Gait Pattern Analysis swing-through gait  -     Gait, Gait Deviations codie decreased;step length decreased;weight-shifting ability decreased  -     Gait, Safety Issues sequencing ability decreased;step length decreased;weight-shifting ability decreased  -     Gait, Impairments strength decreased;pain  -MJ     Gait, Comment Pt demo step through gait pattern at slow pace. Verbal cues for upright posture and to stay in middle of RW. Cues to increase LE weight bearing, improvement noted. Gait limited by pain and fatigue  -     Therapy Exercises    Bilateral Lower Extremities AROM:;10 reps;ankle pumps/circles;glut sets;quad sets;hip IR;hip ER   abdominal sets  -     Sensory Assessment/Intervention    Light Touch LLE;RLE   denies numbness/tingling; can actively DF both ankles  - LUE;RUE  -ST    LUE Light Touch  WNL  -ST    RUE Light Touch  WNL  -ST    LLE Light Touch WNL  -MJ     RLE Light Touch WNL  -MJ     Positioning and Restraints    Pre-Treatment  Position in bed  -MJ in bed  -ST    Post Treatment Position chair  -MJ chair  -ST    In Chair notified nsg;reclined;call light within reach;encouraged to call for assist;with OT  -MJ notified nsg;reclined;call light within reach;encouraged to call for assist;exit alarm on;legs elevated  -ST      11/15/17 1713 11/15/17 0636    Rehab Evaluation    Evaluation Not Performed patient/family decline, not feeling well   Patient seemed agreeable but family refused d/t patient's pain level. Will check back in AM to complete eval.   -LR     General Information    Equipment Currently Used at Home  none  -LB    Living Environment    Lives With  spouse  -LB    Living Arrangements  house  -LB    Home Accessibility  bed and bath on same level;no concerns;stairs within home  -LB      User Key  (r) = Recorded By, (t) = Taken By, (c) = Cosigned By    Initials Name Provider Type     Lorena Parikh, OTR Occupational Therapist    LR Khushboo Aviles, PT Physical Therapist    LB Jessi Patricia, RN Registered Nurse    STEVEN Patel, PT Physical Therapist          Physical Therapy Education     Title: PT OT SLP Therapies (Done)     Topic: Physical Therapy (Done)     Point: Mobility training (Done)    Learning Progress Summary    Learner Readiness Method Response Comment Documented by Status   Patient Acceptance MATEUS LARA,NR Reviewed back precautions, log roll, [facility] 11/16/17 1431 Done               Point: Home exercise program (Done)    Learning Progress Summary    Learner Readiness Method Response Comment Documented by Status   Patient Acceptance MATEUS LARA,NR Reviewed back precautions, log roll, [facility] 11/16/17 1431 Done               Point: Body mechanics (Done)    Learning Progress Summary    Learner Readiness Method Response Comment Documented by Status   Patient Acceptance MATEUS LARA,NR Reviewed back precautions, log roll, [facility] 11/16/17 1431 Done               Point: Precautions (Done)    Learning Progress Summary    Learner  Readiness Method Response Comment Documented by Status   Patient Acceptance E,D GINA,NR Reviewed back precautions, log roll, [facility]  11/16/17 1431 Done                      User Key     Initials Effective Dates Name Provider Type Discipline     03/14/16 -  Tristen Patel PT Physical Therapist PT                PT Recommendation and Plan  Anticipated Equipment Needs At Discharge: front wheeled walker  Anticipated Discharge Disposition: skilled nursing facility  Planned Therapy Interventions: balance training, bed mobility training, gait training, home exercise program, patient/family education, stair training, strengthening, transfer training  PT Frequency: daily  Plan of Care Review  Plan Of Care Reviewed With: patient  Outcome Summary/Follow up Plan: Pt ambulated 180 feet with CGA and RW, limited by fatigue. PT will follow to increase strength and progress functional mobility with back precautions. Plan is rehab at discharge as  unable to physically assist          IP PT Goals       11/16/17 1433          Bed Mobility PT LTG    Bed Mobility PT LTG, Date Established 11/16/17  -MJ      Bed Mobility PT LTG, Time to Achieve 5 days  -MJ      Bed Mobility PT LTG, Activity Type supine to sit/sit to supine   log roll  -MJ      Bed Mobility PT LTG, [place] Level conditional independence  -MJ      Bed Mobility PT LTG, Date Goal Reviewed 11/16/17  -MJ      Bed Mobility PT LTG, Outcome goal ongoing  -MJ      Transfer Training PT LTG    Transfer Training PT LTG, Date Established 11/16/17  -MJ      Transfer Training PT LTG, Time to Achieve 5 days  -MJ      Transfer Training PT LTG, Activity Type sit to stand/stand to sit  -MJ      Transfer Training PT LTG, [place] Level conditional independence  -MJ      Transfer Training PT LTG, Assist Device walker, rolling  -MJ      Transfer Training PT  LTG, Date Goal Reviewed 11/16/17  -MJ      Transfer Training PT LTG, Outcome goal ongoing  -MJ      Gait Training PT LTG     Gait Training Goal PT LTG, Date Established 11/16/17  -MJ      Gait Training Goal PT LTG, Time to Achieve 5 days  -MJ      Gait Training Goal PT LTG, [place] Level conditional independence  -MJ      Gait Training Goal PT LTG, Assist Device walker, rolling  -MJ      Gait Training Goal PT LTG, Distance to Achieve 350 feet  -MJ      Gait Training Goal PT LTG, Date Goal Reviewed 11/16/17  -MJ      Gait Training Goal PT LTG, Outcome goal ongoing  -MJ        User Key  (r) = Recorded By, (t) = Taken By, (c) = Cosigned By    Initials Name Provider Type    STEVEN Patel, PT Physical Therapist                Outcome Measures       11/16/17 1306 11/16/17 1305       How much help from another person do you currently need...    Turning from your back to your side while in flat bed without using bedrails? 3  -MJ      Moving from lying on back to sitting on the side of a flat bed without bedrails? 3  -MJ      Moving to and from a bed to a chair (including a wheelchair)? 3  -MJ      Standing up from a chair using your arms (e.g., wheelchair, bedside chair)? 3  -MJ      Climbing 3-5 steps with a railing? 3  -MJ      To walk in hospital room? 3  -MJ      AM-PAC 6 Clicks Score 18  -MJ      How much help from another is currently needed...    Putting on and taking off regular lower body clothing?  3  -ST     Bathing (including washing, rinsing, and drying)  3  -ST     Toileting (which includes using toilet bed pan or urinal)  3  -ST     Putting on and taking off regular upper body clothing  4  -ST     Taking care of personal grooming (such as brushing teeth)  4  -ST     Eating meals  4  -ST     Score  21  -ST     Functional Assessment    Outcome Measure Options AM-PAC 6 Clicks Basic Mobility (PT)  -MJ AM-PAC 6 Clicks Daily Activity (OT)  -ST       User Key  (r) = Recorded By, (t) = Taken By, (c) = Cosigned By    Initials Name Provider Type    ST Lorena Parikh, OTR Occupational Therapist    STEVEN Patel, PT Physical Therapist            Time Calculation:         PT Charges       11/16/17 8505          Time Calculation    Start Time 1306  -MJ      PT Received On 11/16/17  -MJ      PT Goal Re-Cert Due Date 11/26/17  -      Time Calculation- PT    Total Timed Code Minutes- PT 10 minute(s)  -        User Key  (r) = Recorded By, (t) = Taken By, (c) = Cosigned By    Initials Name Provider Type    STEVEN Patel PT Physical Therapist          Therapy Charges for Today     Code Description Service Date Service Provider Modifiers Qty    97997816950 HC PT EVAL MOD COMPLEXITY 3 11/16/2017 Tristen Patel, PT GP 1    72152464923 HC GAIT TRAINING EA 15 MIN 11/16/2017 Tristen Patel, PT GP 1          PT G-Codes  Outcome Measure Options: AM-PAC 6 Clicks Basic Mobility (PT)      Tristen Patel PT  11/16/2017

## 2017-11-17 PROCEDURE — 97110 THERAPEUTIC EXERCISES: CPT

## 2017-11-17 PROCEDURE — 97116 GAIT TRAINING THERAPY: CPT

## 2017-11-17 PROCEDURE — 25010000002 HYDROMORPHONE PER 4 MG: Performed by: INTERNAL MEDICINE

## 2017-11-17 RX ORDER — VITS A,C,E/LUTEIN/MINERALS 300MCG-200
1 TABLET ORAL DAILY
Status: DISCONTINUED | OUTPATIENT
Start: 2017-11-17 | End: 2017-11-18 | Stop reason: HOSPADM

## 2017-11-17 RX ADMIN — TRAMADOL HYDROCHLORIDE 50 MG: 50 TABLET, COATED ORAL at 21:57

## 2017-11-17 RX ADMIN — AMLODIPINE BESYLATE 5 MG: 5 TABLET ORAL at 09:31

## 2017-11-17 RX ADMIN — HYDROCODONE BITARTRATE AND ACETAMINOPHEN 1 TABLET: 7.5; 325 TABLET ORAL at 00:49

## 2017-11-17 RX ADMIN — Medication 1 TABLET: at 17:21

## 2017-11-17 RX ADMIN — TRAMADOL HYDROCHLORIDE 50 MG: 50 TABLET, COATED ORAL at 05:57

## 2017-11-17 RX ADMIN — FAMOTIDINE 20 MG: 20 TABLET, FILM COATED ORAL at 09:31

## 2017-11-17 RX ADMIN — HYDROCODONE BITARTRATE AND ACETAMINOPHEN 1 TABLET: 7.5; 325 TABLET ORAL at 21:41

## 2017-11-17 RX ADMIN — FAMOTIDINE 20 MG: 20 TABLET, FILM COATED ORAL at 21:41

## 2017-11-17 RX ADMIN — HYDROCODONE BITARTRATE AND ACETAMINOPHEN 1 TABLET: 7.5; 325 TABLET ORAL at 15:20

## 2017-11-17 NOTE — PROGRESS NOTES
"Ortho Spine Progress Note     LOS: 2 days   Patient Care Team:  Pam Reid MD as PCP - General (Internal Medicine)    Subjective: Back sore.  Otherwise no unusual complaints.    Objective:   Vital Signs:  /62 (Patient Position: Lying)  Pulse 97  Temp 99.2 °F (37.3 °C) (Oral)   Resp 17  Ht 61\" (154.9 cm)  Wt 152 lb 1.9 oz (69 kg)  SpO2 97%  BMI 28.74 kg/m2    Labs:  Lab Results (last 24 hours)     ** No results found for the last 24 hours. **          Awake, alert, and oriented.  Motor intact in lower extremities.    Assessment/Plan: Stable.  Continue mobilization/physical therapy.  Awaiting rehabilitation bed at [facility].    Micah Duarte MD  11/17/17  11:11 AM      "

## 2017-11-17 NOTE — THERAPY TREATMENT NOTE
Acute Care - Physical Therapy Treatment Note  [facility]     Patient Name: Sarai Hardy  : 1945  MRN: [id number]  Today's Date: 2017  Onset of Illness/Injury or Date of Surgery Date: 11/15/17  Date of Referral to PT: 11/15/17  Referring Physician: MD Gerald    Admit Date: 11/15/2017    Visit Dx:    ICD-10-CM ICD-9-CM   1. Impaired mobility and ADLs Z74.09 799.89   2. Impaired functional mobility, balance, gait, and endurance Z74.09 V49.89   3. S/P lumbar fusion Z98.1 V45.4     Patient Active Problem List   Diagnosis   • Back pain   • S/P lumbar fusion   • HTN (hypertension)   • GERD (gastroesophageal reflux disease)   • Leukocytosis, likely reactive   • Acute blood loss anemia, mild, asymptomatic               Adult Rehabilitation Note       17 1534          Rehab Assessment/Intervention    Discipline physical therapist  -LM      Document Type therapy note (daily note)  -LM      Subjective Information agree to therapy;complains of;pain  -LM      Patient Effort, Rehab Treatment good  -LM      Precautions/Limitations spinal precautions  -LM      Recorded by [LM] Cailin Bean, PT      Pain Assessment    Pain Assessment 0-10  -LM      Pain Score 10  -LM      Post Pain Score 6  -LM      Pain Type Acute pain  -LM      Pain Location Back  -LM      Pain Orientation Lower  -LM      Pain Intervention(s) Repositioned;Ambulation/increased activity  -LM      Response to Interventions tolerated  -LM      Recorded by [LM] Cailin Bean PT      Cognitive Assessment/Intervention    Current Cognitive/Communication Assessment functional  -LM      Orientation Status oriented x 4  -LM      Follows Commands/Answers Questions 100% of the time;able to follow single-step instructions;needs cueing  -LM      Personal Safety WNL/WFL  -LM      Personal Safety Interventions fall prevention program maintained;gait belt;nonskid shoes/slippers when out of bed;supervised activity  -LM      Recorded by [LM] Cailin PLASCENCIA  Mountain, PT      Bed Mobility, Assessment/Treatment    Bed Mobility, Assistive Device head of bed elevated;bed rails  -LM      Bed Mob, Supine to Sit, [place] supervision required;verbal cues required  -LM      Bed Mob, Sit to Supine, [place] supervision required;verbal cues required  -LM      Bed Mobility, Safety Issues decreased use of legs for bridging/pushing  -LM      Bed Mobility, Impairments strength decreased;pain  -LM      Bed Mobility, Comment Pt demo proper log roll technique into and out of bed. Verbal cues for sequencing.  -LM      Recorded by [LM] Cailin Bean, PT      Transfer Assessment/Treatment    Transfers, Sit-Stand [place] contact guard assist;verbal cues required  -LM      Transfers, Stand-Sit [place] contact guard assist;verbal cues required  -LM      Transfers, Sit-Stand-Sit, Assist Device rolling walker  -LM      Transfer, Safety Issues sequencing ability decreased;step length decreased  -LM      Transfer, Impairments strength decreased;pain  -LM      Transfer, Comment Verbal cues for proper hand placement and safety.  -LM      Recorded by [LM] Cailin Bean, PT      Gait Assessment/Treatment    Gait, [place] Level stand by assist;verbal cues required  -LM      Gait, Assistive Device rolling walker  -LM      Gait, Distance (Feet) 250  -LM      Gait, Gait Pattern Analysis swing-through gait  -LM      Gait, Gait Deviations bilateral:;codie decreased;decreased heel strike;forward flexed posture;step length decreased  -LM      Gait, Safety Issues sequencing ability decreased;step length decreased  -LM      Gait, Impairments strength decreased;pain  -LM      Gait, Comment Pt demo step-through pattern at slow pace. Verbal cues for upright posture and remaining within RW during turns. Gait limited by pain and fatigue.  -LM      Recorded by [LM] Cailin Bean, PT      Therapy Exercises    Bilateral Lower Extremities AROM:;10 reps;supine;ankle pumps/circles;glut  sets;quad sets;hip IR;hip ER;heel slides;other reps   abd set, arms overhead  -LM      Recorded by [LM] Cailin Bean PT      Positioning and Restraints    Pre-Treatment Position in bed  -LM      Post Treatment Position bed  -LM      In Bed supine;call light within reach;encouraged to call for assist;side rails up x2  -LM      Recorded by [LM] Cailin Bean PT        User Key  (r) = Recorded By, (t) = Taken By, (c) = Cosigned By    Initials Name Effective Dates    LM Cailin Bean, PT 06/09/17 -                 IP PT Goals       11/17/17 1604 11/16/17 1433       Bed Mobility PT LTG    Bed Mobility PT LTG, Date Established  11/16/17  -MJ     Bed Mobility PT LTG, Time to Achieve  5 days  -MJ     Bed Mobility PT LTG, Activity Type  supine to sit/sit to supine   log roll  -MJ     Bed Mobility PT LTG, [place] Level  conditional independence  -MJ     Bed Mobility PT LTG, Date Goal Reviewed 11/17/17  -LM 11/16/17  -MJ     Bed Mobility PT LTG, Outcome goal ongoing  -LM goal ongoing  -MJ     Transfer Training PT LTG    Transfer Training PT LTG, Date Established  11/16/17  -MJ     Transfer Training PT LTG, Time to Achieve  5 days  -MJ     Transfer Training PT LTG, Activity Type  sit to stand/stand to sit  -MJ     Transfer Training PT LTG, [place] Level  conditional independence  -MJ     Transfer Training PT LTG, Assist Device  walker, rolling  -MJ     Transfer Training PT  LTG, Date Goal Reviewed 11/17/17  -LM 11/16/17  -MJ     Transfer Training PT LTG, Outcome goal ongoing  -LM goal ongoing  -MJ     Gait Training PT LTG    Gait Training Goal PT LTG, Date Established  11/16/17  -MJ     Gait Training Goal PT LTG, Time to Achieve  5 days  -MJ     Gait Training Goal PT LTG, [place] Level  conditional independence  -MJ     Gait Training Goal PT LTG, Assist Device  walker, rolling  -MJ     Gait Training Goal PT LTG, Distance to Achieve  350 feet  -MJ     Gait Training Goal PT LTG, Date Goal Reviewed 11/17/17   - 11/16/17  -MJ     Gait Training Goal PT LTG, Outcome goal ongoing  - goal ongoing  -       User Key  (r) = Recorded By, (t) = Taken By, (c) = Cosigned By    Initials Name Provider Type    STEVEN Patel, PT Physical Therapist    VIVEK Bean, PT Physical Therapist          Physical Therapy Education     Title: PT OT SLP Therapies (Done)     Topic: Physical Therapy (Done)     Point: Mobility training (Done)    Learning Progress Summary    Learner Readiness Method Response Comment Documented by Status   Patient Acceptance E,D NR,VU Reviewed HEP, correct gait mechanics, safety with mobility, log roll technique, spinal precautions.  11/17/17 1604 Done    Acceptance E,D VU,NR Reviewed back precautions, log roll, HEP  11/16/17 1431 Done               Point: Home exercise program (Done)    Learning Progress Summary    Learner Readiness Method Response Comment Documented by Status   Patient Acceptance E,D NR,VU Reviewed HEP, correct gait mechanics, safety with mobility, log roll technique, spinal precautions.  11/17/17 1604 Done    Acceptance E,D VU,NR Reviewed back precautions, log roll, HEP  11/16/17 1431 Done               Point: Body mechanics (Done)    Learning Progress Summary    Learner Readiness Method Response Comment Documented by Status   Patient Acceptance E,D NR,VU Reviewed HEP, correct gait mechanics, safety with mobility, log roll technique, spinal precautions.  11/17/17 1604 Done    Acceptance E,D VU,NR Reviewed back precautions, log roll, HEP  11/16/17 1431 Done               Point: Precautions (Done)    Learning Progress Summary    Learner Readiness Method Response Comment Documented by Status   Patient Acceptance E,D NR,VU Reviewed HEP, correct gait mechanics, safety with mobility, log roll technique, spinal precautions.  11/17/17 1604 Done    Acceptance E,D VU,NR Reviewed back precautions, log roll, HEP  11/16/17 1431 Done                      User Key     Initials  Effective Dates Name Provider Type Discipline     03/14/16 -  Tristen Patel, PT Physical Therapist PT     06/09/17 -  Cailin Bean PT Physical Therapist PT                    PT Recommendation and Plan  Anticipated Equipment Needs At Discharge: front wheeled walker  Anticipated Discharge Disposition: skilled nursing facility  Planned Therapy Interventions: balance training, bed mobility training, gait training, home exercise program, patient/family education, stair training, strengthening, transfer training  PT Frequency: daily  Plan of Care Review  Plan Of Care Reviewed With: patient  Progress: progress toward functional goals as expected  Outcome Summary/Follow up Plan: Pt increased ambulation distance to 250 feet with CGA and RW, limited by pain and fatigue. Pt gave good effort with supine therex. Pt anticipating d/c home with HHPT tomorrow.           Outcome Measures       11/17/17 1534 11/16/17 1306 11/16/17 1305    How much help from another person do you currently need...    Turning from your back to your side while in flat bed without using bedrails? 3  -LM 3  -MJ     Moving from lying on back to sitting on the side of a flat bed without bedrails? 3  -LM 3  -MJ     Moving to and from a bed to a chair (including a wheelchair)? 3  -LM 3  -MJ     Standing up from a chair using your arms (e.g., wheelchair, bedside chair)? 3  -LM 3  -MJ     Climbing 3-5 steps with a railing? 3  -LM 3  -MJ     To walk in hospital room? 3  -LM 3  -MJ     AM-PAC 6 Clicks Score 18  -LM 18  -MJ     How much help from another is currently needed...    Putting on and taking off regular lower body clothing?   3  -ST    Bathing (including washing, rinsing, and drying)   3  -ST    Toileting (which includes using toilet bed pan or urinal)   3  -ST    Putting on and taking off regular upper body clothing   4  -ST    Taking care of personal grooming (such as brushing teeth)   4  -ST    Eating meals   4  -ST    Score   21  -ST     Functional Assessment    Outcome Measure Options AM-PAC 6 Clicks Basic Mobility (PT)  -LM AM-PAC 6 Clicks Basic Mobility (PT)  -MJ AM-PAC 6 Clicks Daily Activity (OT)  -ST      User Key  (r) = Recorded By, (t) = Taken By, (c) = Cosigned By    Initials Name Provider Type     Lorena Parikh, OTR Occupational Therapist    MJ Tristen Patel, PT Physical Therapist    VIVEK Bean PT Physical Therapist           Time Calculation:         PT Charges       11/17/17 1605          Time Calculation    Start Time 1534  -LM      PT Received On 11/17/17  -LM      PT Goal Re-Cert Due Date 11/26/17  -      Time Calculation- PT    Total Timed Code Minutes- PT 25 minute(s)  -        User Key  (r) = Recorded By, (t) = Taken By, (c) = Cosigned By    Initials Name Provider Type    VIVEK Bean, PT Physical Therapist          Therapy Charges for Today     Code Description Service Date Service Provider Modifiers Qty    17643886135 HC GAIT TRAINING EA 15 MIN 11/17/2017 Cailin Bean, PT GP 1    34077701521 HC PT THER PROC EA 15 MIN 11/17/2017 Cailin Bean, PT GP 1          PT G-Codes  Outcome Measure Options: AM-PAC 6 Clicks Basic Mobility (PT)    Cailin Bean PT  11/17/2017

## 2017-11-17 NOTE — PROGRESS NOTES
"IM progress note      Sarai Hardy  [id number]  1945     LOS: 2 days     Attending: Micah Duarte MD    Primary Care Provider: Pam Reid MD      Chief Complaint/Reason for visit:  Back pain    Subjective   Feels good. Pain control adequate. Had some numbness, tingling or pain BLE during the night, better this morning. Denies f/c/n/v/sob/cp.  ( good progress overall.)wy  Objective     Vital Signs  Blood pressure 113/62, pulse 97, temperature 99.2 °F (37.3 °C), temperature source Oral, resp. rate 17, height 61\" (154.9 cm), weight 152 lb 1.9 oz (69 kg), SpO2 97 %, not currently breastfeeding.  Temp (24hrs), Av.1 °F (37.3 °C), Min:98.4 °F (36.9 °C), Max:99.5 °F (37.5 °C)      Nutrition: PO    Respiratory: RA    Physical Therapy: Pt ambulated 180 feet with CGA and RW, limited by fatigue. PT will follow to increase strength and progress functional mobility with back precautions. Plan is rehab at discharge as  unable to physically assist    Physical Exam:     General Appearance:    Alert, cooperative, in no acute distress   Head:    Normocephalic, without obvious abnormality, atraumatic    Lungs:     Normal effort, symmetric chest rise, no crepitus, clear to      auscultation bilaterally             Heart:    Irregular rhythm and normal rate, normal S1 and S2, 2/6 murmur   Abdomen:     Normal bowel sounds, no masses, no organomegaly, soft        non-tender, non-distended, no guarding, no rebound                tenderness   Extremities:   No clubbing, cyanosis or edema.  No deformities.    Pulses:   Pulses palpable and equal bilaterally   Skin:   No bleeding, bruising or rash. Back dressing CDI. HV present   Neurologic:   Moves all extremities with no obvious focal motor deficit.  Cranial nerves 2 - 12 grossly intact. Flexion and dorsiflexion intact bilateral feet.       Results Review:     I reviewed the patient's new clinical results.     Results from last 7 days  Lab Units 17  0527 " 11/10/17  1343   WBC 10*3/mm3 15.28* 6.95   HEMOGLOBIN g/dL 10.8* 13.5   HEMATOCRIT % 31.9* 39.1   PLATELETS 10*3/mm3 244 298       Results from last 7 days  Lab Units 11/16/17  0527   SODIUM mmol/L 137   POTASSIUM mmol/L 4.2   CHLORIDE mmol/L 106   CO2 mmol/L 25.0   BUN mg/dL 16   CREATININE mg/dL 0.70   CALCIUM mg/dL 8.5*   GLUCOSE mg/dL 133*     I reviewed the patient's new imaging including images and reports.    All medications reviewed.     amLODIPine 5 mg Oral Daily   famotidine 20 mg Intravenous Q12H   Or      famotidine 20 mg Oral Q12H       Assessment/Plan   Principal Problem:    S/P lumbar fusion  Active Problems:    Back pain    HTN (hypertension)    GERD (gastroesophageal reflux disease)    Leukocytosis, likely reactive    Acute blood loss anemia, mild, asymptomatic      Plan  1. PT/OT- ambulate  2. Pain control-prns   3. IS-encouraged  4. DVT proph- [facility]  5. Bowel regimen  6. Monitor post-op labs  7. DC planning for [facility] ( vs  with HHPT)wy    HTN  - Continue home norvasc  - Monitor BP q4 hrs  - Holding parameters for BP meds  - PRN hydralazine for SBP >170     GERD  - Continue home H2 blocker    Seen and examined by me. Agree with above. Discussed with patient. KARRIE Pearce  11/17/17  10:08 AM

## 2017-11-17 NOTE — PROGRESS NOTES
Discharge Planning Assessment  [facility]     Patient Name: Sarai Hardy  MRN: [id number]  Today's Date: 11/17/2017    Admit Date: 11/15/2017          Discharge Needs Assessment     None            Discharge Plan       11/17/17 1244    Case Management/Social Work Plan    Plan Home with Home Health    Patient/Family In Agreement With Plan yes    Additional Comments I met with Mrs Hardy and  to discuss d/c plan. [facility] has notifed us they will not have any subacute beds available today or this weekend. Mrs Hardy has decided she will go home with Home Health services. Options for HH presented. She will use [facility]. I notifed Steffany who accepted referral for home PT/OT. She has a rolling walker and BSC which has been provided by [facility]. No other d/c needs identified.        Discharge Placement     No information found        Expected Discharge Date and Time     Expected Discharge Date Expected Discharge Time    Nov 18, 2017               Demographic Summary     None            Functional Status     None            Psychosocial     None            Abuse/Neglect     None            Legal     None            Substance Abuse     None            Patient Forms     None          Sonja C Kellerman, RN

## 2017-11-17 NOTE — PLAN OF CARE
Problem: Patient Care Overview (Adult)  Goal: Plan of Care Review  Outcome: Ongoing (interventions implemented as appropriate)    11/17/17 1604   Coping/Psychosocial Response Interventions   Plan Of Care Reviewed With patient   Patient Care Overview   Progress progress toward functional goals as expected   Outcome Evaluation   Outcome Summary/Follow up Plan Pt increased ambulation distance to 250 feet with CGA and RW, limited by pain and fatigue. Pt gave good effort with supine therex. Pt anticipating d/c home with HHPT tomorrow.          Problem: Inpatient Physical Therapy  Goal: Bed Mobility Goal LTG- PT  Outcome: Ongoing (interventions implemented as appropriate)    11/16/17 1433 11/17/17 1604   Bed Mobility PT LTG   Bed Mobility PT LTG, Date Established 11/16/17 --    Bed Mobility PT LTG, Time to Achieve 5 days --    Bed Mobility PT LTG, Activity Type supine to sit/sit to supine  (log roll) --    Bed Mobility PT LTG, [place] Level conditional independence --    Bed Mobility PT LTG, Date Goal Reviewed --  11/17/17   Bed Mobility PT LTG, Outcome --  goal ongoing       Goal: Transfer Training Goal 1 LTG- PT  Outcome: Ongoing (interventions implemented as appropriate)    11/16/17 1433 11/17/17 1604   Transfer Training PT LTG   Transfer Training PT LTG, Date Established 11/16/17 --    Transfer Training PT LTG, Time to Achieve 5 days --    Transfer Training PT LTG, Activity Type sit to stand/stand to sit --    Transfer Training PT LTG, [place] Level conditional independence --    Transfer Training PT LTG, Assist Device walker, rolling --    Transfer Training PT LTG, Date Goal Reviewed --  11/17/17   Transfer Training PT LTG, Outcome --  goal ongoing       Goal: Gait Training Goal LTG- PT  Outcome: Ongoing (interventions implemented as appropriate)    11/16/17 1433 11/17/17 1604   Gait Training PT LTG   Gait Training Goal PT LTG, Date Established 11/16/17 --    Gait Training Goal PT LTG, Time to Achieve 5  days --    Gait Training Goal PT LTG, [place] Level conditional independence --    Gait Training Goal PT LTG, Assist Device walker, rolling --    Gait Training Goal PT LTG, Distance to Achieve 350 feet --    Gait Training Goal PT LTG, Date Goal Reviewed --  11/17/17   Gait Training Goal PT LTG, Outcome --  goal ongoing

## 2017-11-18 VITALS
BODY MASS INDEX: 28.72 KG/M2 | RESPIRATION RATE: 17 BRPM | OXYGEN SATURATION: 96 % | SYSTOLIC BLOOD PRESSURE: 115 MMHG | DIASTOLIC BLOOD PRESSURE: 59 MMHG | TEMPERATURE: 97.6 F | HEART RATE: 97 BPM | HEIGHT: 61 IN | WEIGHT: 152.12 LBS

## 2017-11-18 PROCEDURE — 97110 THERAPEUTIC EXERCISES: CPT

## 2017-11-18 RX ORDER — HYDROCODONE BITARTRATE AND ACETAMINOPHEN 7.5; 325 MG/1; MG/1
1 TABLET ORAL EVERY 6 HOURS PRN
Status: ON HOLD
Start: 2017-11-18 | End: 2018-08-16

## 2017-11-18 RX ORDER — DOCUSATE SODIUM 100 MG/1
100 CAPSULE, LIQUID FILLED ORAL 2 TIMES DAILY
Qty: 60 CAPSULE | Refills: 0 | Status: ON HOLD | OUTPATIENT
Start: 2017-11-18 | End: 2018-08-15

## 2017-11-18 RX ADMIN — TRAMADOL HYDROCHLORIDE 50 MG: 50 TABLET, COATED ORAL at 04:25

## 2017-11-18 RX ADMIN — Medication 1 TABLET: at 09:25

## 2017-11-18 RX ADMIN — HYDROCODONE BITARTRATE AND ACETAMINOPHEN 1 TABLET: 7.5; 325 TABLET ORAL at 09:55

## 2017-11-18 RX ADMIN — HYDROCODONE BITARTRATE AND ACETAMINOPHEN 1 TABLET: 7.5; 325 TABLET ORAL at 04:25

## 2017-11-18 RX ADMIN — FAMOTIDINE 20 MG: 20 TABLET, FILM COATED ORAL at 09:25

## 2017-11-18 RX ADMIN — AMLODIPINE BESYLATE 5 MG: 5 TABLET ORAL at 09:24

## 2017-11-18 NOTE — PLAN OF CARE
Problem: Patient Care Overview (Adult)  Goal: Plan of Care Review  Outcome: Ongoing (interventions implemented as appropriate)    11/18/17 1142   Coping/Psychosocial Response Interventions   Plan Of Care Reviewed With patient;spouse   Patient Care Overview   Progress progress toward functional goals as expected   Outcome Evaluation   Outcome Summary/Follow up Plan Pt ambulated 200 feet with SBA and RW, limited by pain and fatigue. Pt reports compliance and independence with HEP. Pt anticipating d/c home with HHPT today.          Problem: Inpatient Physical Therapy  Goal: Bed Mobility Goal LTG- PT  Outcome: Unable to achieve outcome(s) by discharge Date Met:  11/18/17 11/16/17 1433 11/18/17 1142   Bed Mobility PT LTG   Bed Mobility PT LTG, Date Established 11/16/17 --    Bed Mobility PT LTG, Time to Achieve 5 days --    Bed Mobility PT LTG, Activity Type supine to sit/sit to supine  (log roll) --    Bed Mobility PT LTG, [place] Level conditional independence --    Bed Mobility PT LTG, Date Goal Reviewed --  11/18/17   Bed Mobility PT LTG, Outcome --  goal not met   Bed Mobility PT LTG, Reason Goal Not Met --  discharged from facility       Goal: Transfer Training Goal 1 LTG- PT  Outcome: Unable to achieve outcome(s) by discharge Date Met:  11/18/17 11/16/17 1433 11/18/17 1142   Transfer Training PT LTG   Transfer Training PT LTG, Date Established 11/16/17 --    Transfer Training PT LTG, Time to Achieve 5 days --    Transfer Training PT LTG, Activity Type sit to stand/stand to sit --    Transfer Training PT LTG, [place] Level conditional independence --    Transfer Training PT LTG, Assist Device walker, rolling --    Transfer Training PT LTG, Date Goal Reviewed --  11/18/17   Transfer Training PT LTG, Outcome --  goal not met   Transfer Training PT LTG, Reason Goal Not Met --  discharged from facility       Goal: Gait Training Goal LTG- PT  Outcome: Unable to achieve outcome(s) by discharge Date Met:   11/18/17 11/16/17 1433 11/18/17 1142   Gait Training PT LTG   Gait Training Goal PT LTG, Date Established 11/16/17 --    Gait Training Goal PT LTG, Time to Achieve 5 days --    Gait Training Goal PT LTG, [place] Level conditional independence --    Gait Training Goal PT LTG, Assist Device walker, rolling --    Gait Training Goal PT LTG, Distance to Achieve 350 feet --    Gait Training Goal PT LTG, Date Goal Reviewed --  11/18/17   Gait Training Goal PT LTG, Outcome --  goal not met   Gait Training Goal PT LTG, Reason Goal Not Met --  discharged from facility

## 2017-11-18 NOTE — PROGRESS NOTES
"Ortho Spine Progress Note     LOS: 3 days   Patient Care Team:  Pam Reid MD as PCP - General (Internal Medicine)    Subjective   No unusual complaints  Objective     Vital Signs:  /66 (BP Location: Right arm, Patient Position: Lying)  Pulse 93  Temp 98.6 °F (37 °C) (Oral)   Resp 16  Ht 61\" (154.9 cm)  Wt 152 lb 1.9 oz (69 kg)  SpO2 95%  BMI 28.74 kg/m2    Gait, Distance (Feet): 250    Labs:  Lab Results (last 24 hours)     ** No results found for the last 24 hours. **          Physical Exam:  Neuro intact    Assessment/Plan   Doing well  Home today    José Manuel Deleon MD  11/18/17  8:52 AM    "

## 2017-11-18 NOTE — PLAN OF CARE
Problem: Patient Care Overview (Adult)  Goal: Plan of Care Review  Outcome: Ongoing (interventions implemented as appropriate)    11/17/17 2008   Patient Care Overview   Progress improving   Outcome Evaluation   Outcome Summary/Follow up Plan Pt VS WNL except for temp 99.2-100.1 during shift. Pt pain manged with prn medication. Pt ambulating with assist of 1 to bathroom, voiding spontaneously. Surgical drain removed with no complications. Pt consistently refuses teds/scds. Pt to d/c home tomorrow.          Problem: Perioperative Period (Adult)  Goal: Signs and Symptoms of Listed Potential Problems Will be Absent or Manageable (Perioperative Period)  Outcome: Ongoing (interventions implemented as appropriate)    11/17/17 2008   Perioperative Period   Problems Assessed (Perioperative Period) all   Problems Present (Perioperative Period) pain         Problem: Pain, Acute (Adult)  Goal: Identify Related Risk Factors and Signs and Symptoms  Outcome: Ongoing (interventions implemented as appropriate)    11/17/17 2008   Pain, Acute   Related Risk Factors (Acute Pain) persistent pain;disease process;surgery   Signs and Symptoms (Acute Pain) fatigue/weakness;verbalization of pain descriptors       Goal: Acceptable Pain Control/Comfort Level  Outcome: Ongoing (interventions implemented as appropriate)

## 2017-11-18 NOTE — THERAPY DISCHARGE NOTE
Acute Care - Physical Therapy Treatment Note/Discharge  [facility]     Patient Name: Sarai Hardy  : 1945  MRN: [id number]  Today's Date: 2017  Onset of Illness/Injury or Date of Surgery Date: 11/15/17  Date of Referral to PT: 11/15/17  Referring Physician: MD Gerald    Admit Date: 11/15/2017    Visit Dx:    ICD-10-CM ICD-9-CM   1. Impaired mobility and ADLs Z74.09 799.89   2. Impaired functional mobility, balance, gait, and endurance Z74.09 V49.89   3. S/P lumbar fusion Z98.1 V45.4     Patient Active Problem List   Diagnosis   • Back pain   • S/P lumbar fusion   • HTN (hypertension)   • GERD (gastroesophageal reflux disease)   • Leukocytosis, likely reactive   • Acute blood loss anemia, mild, asymptomatic       Physical Therapy Education     Title: PT OT SLP Therapies (Done)     Topic: Physical Therapy (Done)     Point: Mobility training (Done)    Learning Progress Summary    Learner Readiness Method Response Comment Documented by Status   Patient Acceptance E,D NR,VU Reviewed HEP, spinal precautions, safety with mobility, log roll technique.  17 1141 Done    Acceptance E,D NR,VU Reviewed HEP, correct gait mechanics, safety with mobility, log roll technique, spinal precautions.  17 1604 Done    Acceptance E,D VU,NR Reviewed back precautions, log roll, HEP  17 1431 Done   Significant Other Acceptance E,D NR,VU Reviewed HEP, spinal precautions, safety with mobility, log roll technique.  17 1141 Done               Point: Home exercise program (Done)    Learning Progress Summary    Learner Readiness Method Response Comment Documented by Status   Patient Acceptance E,D NR,VU Reviewed HEP, spinal precautions, safety with mobility, log roll technique.  17 1141 Done    Acceptance E,D NR,VU Reviewed HEP, correct gait mechanics, safety with mobility, log roll technique, spinal precautions.  17 1604 Done    Acceptance E,D VU,NR Reviewed back precautions, log  roll, HEP  11/16/17 1431 Done   Significant Other Acceptance E,D NR,VU Reviewed HEP, spinal precautions, safety with mobility, log roll technique.  11/18/17 1141 Done               Point: Body mechanics (Done)    Learning Progress Summary    Learner Readiness Method Response Comment Documented by Status   Patient Acceptance E,D NR,VU Reviewed HEP, spinal precautions, safety with mobility, log roll technique.  11/18/17 1141 Done    Acceptance E,D NR,VU Reviewed HEP, correct gait mechanics, safety with mobility, log roll technique, spinal precautions.  11/17/17 1604 Done    Acceptance E,D VU,NR Reviewed back precautions, log roll, HEP  11/16/17 1431 Done   Significant Other Acceptance E,D NR,VU Reviewed HEP, spinal precautions, safety with mobility, log roll technique.  11/18/17 1141 Done               Point: Precautions (Done)    Learning Progress Summary    Learner Readiness Method Response Comment Documented by Status   Patient Acceptance E,D NR,VU Reviewed HEP, spinal precautions, safety with mobility, log roll technique.  11/18/17 1141 Done    Acceptance E,D NR,VU Reviewed HEP, correct gait mechanics, safety with mobility, log roll technique, spinal precautions.  11/17/17 1604 Done    Acceptance E,D VU,NR Reviewed back precautions, log roll, HEP  11/16/17 1431 Done   Significant Other Acceptance E,D NR,VU Reviewed HEP, spinal precautions, safety with mobility, log roll technique.  11/18/17 1141 Done                      User Key     Initials Effective Dates Name Provider Type Discipline     03/14/16 -  Tristen Patel, PT Physical Therapist PT     06/09/17 -  Cailin Bean PT Physical Therapist PT                    IP PT Goals       11/18/17 1142 11/17/17 1604 11/16/17 1433    Bed Mobility PT LTG    Bed Mobility PT LTG, Date Established   11/16/17  -MJ    Bed Mobility PT LTG, Time to Achieve   5 days  -MJ    Bed Mobility PT LTG, Activity Type   supine to sit/sit to supine   log roll  -     Bed Mobility PT LTG, [place] Level   conditional independence  -MJ    Bed Mobility PT LTG, Date Goal Reviewed 11/18/17  -LM 11/17/17  -LM 11/16/17  -MJ    Bed Mobility PT LTG, Outcome goal not met  - goal ongoing  - goal ongoing  -MJ    Bed Mobility PT LTG, Reason Goal Not Met discharged from facility  -      Transfer Training PT LTG    Transfer Training PT LTG, Date Established   11/16/17  -MJ    Transfer Training PT LTG, Time to Achieve   5 days  -MJ    Transfer Training PT LTG, Activity Type   sit to stand/stand to sit  -MJ    Transfer Training PT LTG, [place] Level   conditional independence  -MJ    Transfer Training PT LTG, Assist Device   walker, rolling  -MJ    Transfer Training PT  LTG, Date Goal Reviewed 11/18/17  -LM 11/17/17  -LM 11/16/17  -MJ    Transfer Training PT LTG, Outcome goal not met  - goal ongoing  - goal ongoing  -MJ    Transfer Training PT LTG, Reason Goal Not Met discharged from [facility]  -      Gait Training PT LTG    Gait Training Goal PT LTG, Date Established   11/16/17  -MJ    Gait Training Goal PT LTG, Time to Achieve   5 days  -MJ    Gait Training Goal PT LTG, [place] Level   conditional independence  -MJ    Gait Training Goal PT LTG, Assist Device   walker, rolling  -MJ    Gait Training Goal PT LTG, Distance to Achieve   350 feet  -MJ    Gait Training Goal PT LTG, Date Goal Reviewed 11/18/17  -LM 11/17/17  -LM 11/16/17  -MJ    Gait Training Goal PT LTG, Outcome goal not met  - goal ongoing  - goal ongoing  -MJ    Gait Training Goal PT LTG, Reason Goal Not Met discharged from [facility]  -        User Key  (r) = Recorded By, (t) = Taken By, (c) = Cosigned By    Initials Name Provider Type    STEVEN Patel, PT Physical Therapist    LM Cailin Bean, PT Physical Therapist              Adult Rehabilitation Note       11/18/17 1111 11/17/17 1534       Rehab Assessment/Intervention    Discipline physical therapist  - physical therapist  -      Document Type therapy note (daily note);discharge summary  -LM therapy note (daily note)  -LM     Subjective Information agree to therapy;complains of;pain  -LM agree to therapy;complains of;pain  -LM     Patient Effort, Rehab Treatment good  -LM good  -LM     Precautions/Limitations spinal precautions  -LM spinal precautions  -LM     Recorded by [LM] Cailin Bean PT [LM] Cailin Bean PT     Pain Assessment    Pain Assessment 0-10  -LM 0-10  -LM     Pain Score 8   10/10 when ambulating  -LM 10  -LM     Post Pain Score 8  -LM 6  -LM     Pain Type Acute pain  -LM Acute pain  -LM     Pain Location Back  -LM Back  -LM     Pain Orientation Lower  -LM Lower  -LM     Pain Intervention(s) Repositioned;Ambulation/increased activity  -LM Repositioned;Ambulation/increased activity  -LM     Response to Interventions tolerated  -LM tolerated  -LM     Recorded by [LM] Cailin Bean PT [LM] Cailin Bean PT     Cognitive Assessment/Intervention    Current Cognitive/Communication Assessment functional  -LM functional  -LM     Orientation Status oriented x 4  -LM oriented x 4  -LM     Follows Commands/Answers Questions 100% of the time;able to follow single-step instructions;needs cueing  -% of the time;able to follow single-step instructions;needs cueing  -LM     Personal Safety WNL/WFL  -LM WNL/WFL  -LM     Personal Safety Interventions gait belt;nonskid shoes/slippers when out of bed;supervised activity  -LM fall prevention program maintained;gait belt;nonskid shoes/slippers when out of bed;supervised activity  -LM     Recorded by [LM] Cailin Bean PT [LM] Cailin Bean PT     Bed Mobility, Assessment/Treatment    Bed Mobility, Assistive Device head of bed elevated;bed rails  -LM head of bed elevated;bed rails  -LM     Bed Mob, Supine to Sit, [place] supervision required;verbal cues required  -LM supervision required;verbal cues required  -LM     Bed Mob, Sit to Supine, [place] supervision  required;verbal cues required  -LM supervision required;verbal cues required  -LM     Bed Mobility, Safety Issues decreased use of legs for bridging/pushing  -LM decreased use of legs for bridging/pushing  -LM     Bed Mobility, Impairments strength decreased;pain  -LM strength decreased;pain  -LM     Bed Mobility, Comment Verbal cues for log roll sequencing.  -LM Pt demo proper log roll technique into and out of bed. Verbal cues for sequencing.  -LM     Recorded by [LM] Cailin Bean PT [LM] Cailin Bean PT     Transfer Assessment/Treatment    Transfers, Sit-Stand [place] contact guard assist;verbal cues required  -LM contact guard assist;verbal cues required  -LM     Transfers, Stand-Sit [place] contact guard assist;verbal cues required  -LM contact guard assist;verbal cues required  -LM     Transfers, Sit-Stand-Sit, Assist Device rolling walker  -LM rolling walker  -LM     Transfer, Safety Issues sequencing ability decreased;step length decreased  -LM sequencing ability decreased;step length decreased  -LM     Transfer, Impairments strength decreased;pain  -LM strength decreased;pain  -LM     Transfer, Comment Verbal cues for correct hand placement and sequencing.  -LM Verbal cues for proper hand placement and safety.  -LM     Recorded by [LM] Cailin Bean, PT [LM] Cailin Bean PT     Gait Assessment/Treatment    Gait, [place] Level stand by assist;verbal cues required  -LM stand by assist;verbal cues required  -LM     Gait, Assistive Device rolling walker  -LM rolling walker  -LM     Gait, Distance (Feet) 200  -  -LM     Gait, Gait Pattern Analysis swing-through gait  -LM swing-through gait  -LM     Gait, Gait Deviations bilateral:;cod[place] decreased;decreased heel strike;forward flexed posture  -LM bilateral:;[place] decreased;decreased heel strike;forward flexed posture;step length decreased  -LM     Gait, Safety Issues step length decreased  -LM sequencing ability  decreased;step length decreased  -LM     Gait, Impairments strength decreased;pain  -LM strength decreased;pain  -LM     Gait, Comment Pt demo step-through pattern at slow pace. Verbal cues to adhere to twisting precaution when turning, remaining within RW, and upright posture. Gait limited by pain and fatigue.  -LM Pt demo step-through pattern at slow pace. Verbal cues for upright posture and remaining within RW during turns. Gait limited by pain and fatigue.  -LM     Recorded by [LM] Cailin Bean, PT [LM] Cailin Bean PT     Therapy Exercises    Bilateral Lower Extremities --   Pt reported compliance with HEP.  -LM AROM:;10 reps;supine;ankle pumps/circles;glut sets;quad sets;hip IR;hip ER;heel slides;other reps   abd set, arms overhead  -LM     Recorded by [LM] Cailin Bean PT [LM] Cailin Bean PT     Positioning and Restraints    Pre-Treatment Position in bed  -LM in bed  -LM     Post Treatment Position bed  -LM bed  -LM     In Bed supine;call light within reach;encouraged to call for assist;with family/caregiver  -LM supine;call light within reach;encouraged to call for assist;side rails up x2  -LM     Recorded by [LM] Cailin Bean, PT [LM] Cailin Bean PT       User Key  (r) = Recorded By, (t) = Taken By, (c) = Cosigned By    Initials Name Effective Dates    VIVEK Bean PT 06/09/17 -           PT Recommendation and Plan  Anticipated Equipment Needs At Discharge: front wheeled walker  Anticipated Discharge Disposition: home with home health  Planned Therapy Interventions: balance training, bed mobility training, gait training, home exercise program, patient/family education, stair training, strengthening, transfer training  PT Frequency: daily  Plan of Care Review  Plan Of Care Reviewed With: patient, spouse  Progress: progress toward functional goals as expected  Outcome Summary/Follow up Plan: Pt ambulated 200 feet with SBA and RW, limited by pain and fatigue. Pt reports  compliance and independence with HEP. Pt anticipating d/c home with HHPT today.           Outcome Measures       11/18/17 1111 11/17/17 1534 11/16/17 1306    How much help from another person do you currently need...    Turning from your back to your side while in flat bed without using bedrails? 3  -LM 3  -LM 3  -MJ    Moving from lying on back to sitting on the side of a flat bed without bedrails? 3  -LM 3  -LM 3  -MJ    Moving to and from a bed to a chair (including a wheelchair)? 3  -LM 3  -LM 3  -MJ    Standing up from a chair using your arms (e.g., wheelchair, bedside chair)? 3  -LM 3  -LM 3  -MJ    Climbing 3-5 steps with a railing? 3  -LM 3  -LM 3  -MJ    To walk in hospital room? 3  -LM 3  -LM 3  -MJ    AM-PAC 6 Clicks Score 18  -LM 18  -LM 18  -MJ    Functional Assessment    Outcome Measure Options AM-PAC 6 Clicks Basic Mobility (PT)  -LM AM-PAC 6 Clicks Basic Mobility (PT)  -LM AM-PAC 6 Clicks Basic Mobility (PT)  -MJ      11/16/17 1305          How much help from another is currently needed...    Putting on and taking off regular lower body clothing? 3  -ST      Bathing (including washing, rinsing, and drying) 3  -ST      Toileting (which includes using toilet bed pan or urinal) 3  -ST      Putting on and taking off regular upper body clothing 4  -ST      Taking care of personal grooming (such as brushing teeth) 4  -ST      Eating meals 4  -ST      Score 21  -ST      Functional Assessment    Outcome Measure Options AM-PAC 6 Clicks Daily Activity (OT)  -ST        User Key  (r) = Recorded By, (t) = Taken By, (c) = Cosigned By    Initials Name Provider Type    ST Lorena Parikh OTR Occupational Therapist    STEVEN Patel, PT Physical Therapist    VIVEK Bean PT Physical Therapist           Time Calculation:         PT Charges       11/18/17 1143          Time Calculation    Start Time 1111  -LM      PT Received On 11/18/17  -LM      PT Goal Re-Cert Due Date 11/26/17  -LM      Time Calculation-  PT    Total Timed Code Minutes- PT 13 minute(s)  -VIVEK        User Key  (r) = Recorded By, (t) = Taken By, (c) = Cosigned By    Initials Name Provider Type    VIVEK Bean PT Physical Therapist          Therapy Charges for Today     Code Description Service Date Service Provider Modifiers Qty    58283600263 HC GAIT TRAINING EA 15 MIN 11/17/2017 Cailin Bean, PT GP 1    65646786712 HC PT THER PROC EA 15 MIN 11/17/2017 Cailin Bean, PT GP 1    86464063162 HC PT THER PROC EA 15 MIN 11/18/2017 Cailin Bean, PT GP 1          PT G-Codes  Outcome Measure Options: AM-PAC 6 Clicks Basic Mobility (PT)    PT Discharge Summary  Anticipated Discharge Disposition: home with home health  Reason for Discharge: Discharge from facility  Outcomes Achieved: Patient able to partially acheive established goals  Discharge Destination: Home with home health    Cailin Bean PT  11/18/2017

## 2017-11-18 NOTE — PROGRESS NOTES
Continued Stay Note  [facility]     Patient Name: Sarai Hardy  MRN: [id number]  Today's Date: 11/18/2017    Admit Date: 11/15/2017          Discharge Plan       11/18/17 1232    Case Management/Social Work Plan    Plan Home with     Patient/Family In Agreement With Plan yes    Additional Comments Patient is medically ready for d/c today.  Plan is to return home with  services with [employer] .  CM called [facility] and notified of patient's discharge today.  No other d/c needs.               Discharge Codes     None        Expected Discharge Date and Time     Expected Discharge Date Expected Discharge Time    Nov 18, 2017             Roxana Bellamy RN

## 2017-11-18 NOTE — DISCHARGE SUMMARY
Patient Name: Sarai Hardy  MRN: [id number]  : 1945  DOS: 2017    Attending: Lorena Peterson MD    Primary Care Provider: Pam Reid MD    Date of Admission:.11/15/2017  6:01 AM    Date of Discharge:  2017    Discharge Diagnosis: Principal Problem:    S/P lumbar fusion  Active Problems:    Back pain    HTN (hypertension)    GERD (gastroesophageal reflux disease)    Leukocytosis, likely reactive    Acute blood loss anemia, mild, asymptomatic      Hospital Course  Patient is a 72 y.o. female presented for scheduled surgery listed below by Dr. Duarte under [place]. She tolerated surgery well and is admitted for further medical management. Her back has been painful for about 6 months. She denies use of assistive device for ambulation.    Patient was provided pain medications as needed for pain control.    She was seen by PT and has progressed well over her stay.  She used an IS for atelectasis prophylaxis and mechanicals for DVT prophylaxis.  Home medications were resumed as appropriate, and labs were monitored and remained fairly stable.     With the progress she has made, she is ready for DC home today.    Discussed with patient regarding plan and she shows understanding and agreement.    Patient will have HHPT following discharge.        Procedures Performed  PREOPERATIVE DIAGNOSES:  1.  L4-L5 spinal stenosis.  2.  L4-L5 degenerative spondylolisthesis.     POSTOPERATIVE DIAGNOSES:  1.  L4-L5 spinal stenosis.  2.  L4-L5 degenerative spondylolisthesis.     PROCEDURE PERFORMED:  1.  Transforaminal lumbar interbody fusion, L4-L5, with DePuy/Synthes interbody fusion cage and bone graft.  2.  Segmental instrumentation, L4-L5, with DePuy transpedicular fixation.  3.  García Vickers type osteotomy, L4-L5, to facilitate reduction of spondylolisthesis and restoration of lordosis.  4.  Posterolateral fusion, L4-L5, with bone graft.  5.  Harvesting of bone graft locally from spinous processes and  "lamina.     SURGEON:  Micah Duarte MD       Pertinent Test Results:    I reviewed the patient's new clinical results.     Results from last 7 days  Lab Units 17  0527   WBC 10*3/mm3 15.28*   HEMOGLOBIN g/dL 10.8*   HEMATOCRIT % 31.9*   PLATELETS 10*3/mm3 244       Results from last 7 days  Lab Units 17  0527   SODIUM mmol/L 137   POTASSIUM mmol/L 4.2   CHLORIDE mmol/L 106   CO2 mmol/L 25.0   BUN mg/dL 16   CREATININE mg/dL 0.70   CALCIUM mg/dL 8.5*   GLUCOSE mg/dL 133*     I reviewed the patient's new imaging including images and reports.      Physical therapy: Pt ambulated 200 feet with SBA and RW, limited by pain and fatigue. Pt reports compliance and independence with HEP. Pt anticipating d/c home with HHPT today.     Discharge Assessment:    Vital Signs  /59  Pulse 97  Temp 97.6 °F (36.4 °C) (Temporal Artery )   Resp 17  Ht 61\" (154.9 cm)  Wt 152 lb 1.9 oz (69 kg)  SpO2 96%  BMI 28.74 kg/m2  Temp (24hrs), Av.9 °F (37.2 °C), Min:97.6 °F (36.4 °C), Max:100.1 °F (37.8 °C)      General Appearance:    Alert, cooperative, in no acute distress   Lungs:     Clear to auscultation,respirations regular, even and                   unlabored    Heart:    Regular rhythm and normal rate, normal S1 and S2   Abdomen:     Normal bowel sounds, no masses, no organomegaly, soft        non-tender, non-distended, no guarding, no rebound                 tenderness   Extremities:   Moves all extremities well, no edema, no cyanosis, no              redness   Pulses:   Pulses palpable and equal bilaterally   Skin:   No bleeding, bruising or rash. Back dressing CDI   Neurologic:   Cranial nerves 2 - 12 grossly intact, sensation intact. Flexion and dorsiflexion intact bilateral feet.       Discharge Disposition: Home    Discharge Medications   Sarai Hardy   Home Medication Instructions RASHI:[id number]    Printed on:17 1051   Medication Information                      amLODIPine (NORVASC) 5 MG " tablet  Take 5 mg by mouth Daily.             docusate sodium (COLACE) 100 MG capsule  Take 1 capsule by mouth 2 (Two) Times a Day.             estradiol (CLIMARA) 0.1 MG/24HR patch  Place 1 patch on the skin 1 (One) Time Per Week.             HYDROcodone-acetaminophen (NORCO) 7.5-325 MG per tablet  Take 1 tablet by mouth Every 6 (Six) Hours As Needed for Moderate Pain .             Multiple Vitamins-Minerals (CENTRUM ADULTS PO)  Take 1 tablet by mouth Daily.             raNITIdine (ZANTAC) 150 MG tablet  Take 150 mg by mouth Every Night.                 Discharge Diet: regular diet    Activity at Discharge: ambulate    Follow-up Appointments  Dr. Duarte per his orders    Discharge took over 30 min.    KARRIE Stephenson  11/18/17  10:58 AM

## 2018-03-14 ENCOUNTER — TRANSCRIBE ORDERS (OUTPATIENT)
Dept: ADMINISTRATIVE | Facility: HOSPITAL | Age: 73
End: 2018-03-14

## 2018-03-14 DIAGNOSIS — Z12.31 VISIT FOR SCREENING MAMMOGRAM: Primary | ICD-10-CM

## 2018-04-25 ENCOUNTER — HOSPITAL ENCOUNTER (OUTPATIENT)
Dept: MAMMOGRAPHY | Facility: HOSPITAL | Age: 73
Discharge: HOME | End: 2018-04-25
Admitting: OBSTETRICS & GYNECOLOGY

## 2018-04-25 DIAGNOSIS — Z12.31 VISIT FOR SCREENING MAMMOGRAM: ICD-10-CM

## 2018-04-25 PROCEDURE — 77063 BREAST TOMOSYNTHESIS BI: CPT

## 2018-04-25 PROCEDURE — 77063 BREAST TOMOSYNTHESIS BI: CPT | Performed by: RADIOLOGY

## 2018-04-25 PROCEDURE — 77067 SCR MAMMO BI INCL CAD: CPT

## 2018-04-25 PROCEDURE — 77067 SCR MAMMO BI INCL CAD: CPT | Performed by: RADIOLOGY

## 2018-08-13 ENCOUNTER — APPOINTMENT (OUTPATIENT)
Dept: PREADMISSION TESTING | Facility: HOSPITAL | Age: 73
End: 2018-08-13

## 2018-08-13 VITALS — WEIGHT: 148.15 LBS | BODY MASS INDEX: 27.97 KG/M2 | HEIGHT: 61 IN

## 2018-08-13 LAB
DEPRECATED RDW RBC AUTO: 43.1 FL (ref 37–54)
ERYTHROCYTE [DISTWIDTH] IN BLOOD BY AUTOMATED COUNT: 14.1 % (ref 11.3–14.5)
HCT VFR BLD AUTO: 36.8 % (ref 34.5–44)
HGB BLD-MCNC: 12.6 G/DL (ref 11.5–15.5)
MCH RBC QN AUTO: 28.5 PG (ref 27–31)
MCHC RBC AUTO-ENTMCNC: 34.2 G/DL (ref 32–36)
MCV RBC AUTO: 83.3 FL (ref 80–99)
PLATELET # BLD AUTO: 241 10*3/MM3 (ref 150–450)
PMV BLD AUTO: 9.7 FL (ref 6–12)
POTASSIUM BLD-SCNC: 3.7 MMOL/L (ref 3.5–5.5)
RBC # BLD AUTO: 4.42 10*6/MM3 (ref 3.89–5.14)
WBC NRBC COR # BLD: 5.83 10*3/MM3 (ref 3.5–10.8)

## 2018-08-13 PROCEDURE — 84132 ASSAY OF SERUM POTASSIUM: CPT | Performed by: ANESTHESIOLOGY

## 2018-08-13 PROCEDURE — 85027 COMPLETE CBC AUTOMATED: CPT | Performed by: ANESTHESIOLOGY

## 2018-08-13 PROCEDURE — 36415 COLL VENOUS BLD VENIPUNCTURE: CPT

## 2018-08-13 PROCEDURE — 93010 ELECTROCARDIOGRAM REPORT: CPT | Performed by: INTERNAL MEDICINE

## 2018-08-13 PROCEDURE — 93005 ELECTROCARDIOGRAM TRACING: CPT

## 2018-08-13 NOTE — PAT
Patient instructed to drink 20 ounces (or until full) of Gatorade or 20 ounces of G2 (if diabetic) and complete 3 hours before your surgery start time. (NO RED Gatorade or G2)    Patient verbalized understanding.

## 2018-08-13 NOTE — DISCHARGE INSTRUCTIONS

## 2018-08-14 ENCOUNTER — ANESTHESIA EVENT (OUTPATIENT)
Dept: PERIOP | Facility: HOSPITAL | Age: 73
End: 2018-08-14

## 2018-08-14 RX ORDER — FAMOTIDINE 10 MG/ML
20 INJECTION, SOLUTION INTRAVENOUS ONCE
Status: CANCELLED | OUTPATIENT
Start: 2018-08-14 | End: 2018-08-14

## 2018-08-15 ENCOUNTER — ANESTHESIA (OUTPATIENT)
Dept: PERIOP | Facility: HOSPITAL | Age: 73
End: 2018-08-15

## 2018-08-15 ENCOUNTER — APPOINTMENT (OUTPATIENT)
Dept: GENERAL RADIOLOGY | Facility: HOSPITAL | Age: 73
End: 2018-08-15

## 2018-08-15 ENCOUNTER — HOSPITAL ENCOUNTER (OUTPATIENT)
Facility: HOSPITAL | Age: 73
Discharge: HOME | End: 2018-08-16
Attending: ORTHOPAEDIC SURGERY | Admitting: ORTHOPAEDIC SURGERY

## 2018-08-15 DIAGNOSIS — Z98.890 S/P LUMBAR DISCECTOMY: ICD-10-CM

## 2018-08-15 DIAGNOSIS — Z74.09 IMPAIRED FUNCTIONAL MOBILITY, BALANCE, GAIT, AND ENDURANCE: Primary | ICD-10-CM

## 2018-08-15 PROCEDURE — A9270 NON-COVERED ITEM OR SERVICE: HCPCS | Performed by: ORTHOPAEDIC SURGERY

## 2018-08-15 PROCEDURE — 72020 X-RAY EXAM OF SPINE 1 VIEW: CPT

## 2018-08-15 PROCEDURE — 25010000003 CEFAZOLIN IN DEXTROSE 2-4 GM/100ML-% SOLUTION: Performed by: ORTHOPAEDIC SURGERY

## 2018-08-15 PROCEDURE — 63710000001 HYDROCODONE-ACETAMINOPHEN 7.5-325 MG TABLET: Performed by: ORTHOPAEDIC SURGERY

## 2018-08-15 PROCEDURE — 97162 PT EVAL MOD COMPLEX 30 MIN: CPT

## 2018-08-15 PROCEDURE — A9270 NON-COVERED ITEM OR SERVICE: HCPCS | Performed by: NURSE PRACTITIONER

## 2018-08-15 PROCEDURE — 63710000001 FAMOTIDINE 20 MG TABLET: Performed by: ORTHOPAEDIC SURGERY

## 2018-08-15 PROCEDURE — 25810000003 SODIUM CHLORIDE 0.9 % WITH KCL 20 MEQ 20-0.9 MEQ/L-% SOLUTION: Performed by: ORTHOPAEDIC SURGERY

## 2018-08-15 PROCEDURE — 25010000002 FENTANYL CITRATE (PF) 100 MCG/2ML SOLUTION: Performed by: NURSE ANESTHETIST, CERTIFIED REGISTERED

## 2018-08-15 PROCEDURE — G0378 HOSPITAL OBSERVATION PER HR: HCPCS

## 2018-08-15 PROCEDURE — 63710000001 AMLODIPINE 5 MG TABLET: Performed by: NURSE PRACTITIONER

## 2018-08-15 PROCEDURE — 25010000002 DEXAMETHASONE PER 1 MG: Performed by: NURSE ANESTHETIST, CERTIFIED REGISTERED

## 2018-08-15 PROCEDURE — G8978 MOBILITY CURRENT STATUS: HCPCS

## 2018-08-15 PROCEDURE — 25010000002 NEOSTIGMINE 10 MG/10ML SOLUTION: Performed by: NURSE ANESTHETIST, CERTIFIED REGISTERED

## 2018-08-15 PROCEDURE — 97530 THERAPEUTIC ACTIVITIES: CPT

## 2018-08-15 PROCEDURE — 25010000002 PROPOFOL 10 MG/ML EMULSION: Performed by: NURSE ANESTHETIST, CERTIFIED REGISTERED

## 2018-08-15 PROCEDURE — G8979 MOBILITY GOAL STATUS: HCPCS

## 2018-08-15 PROCEDURE — 25010000002 ONDANSETRON PER 1 MG: Performed by: NURSE ANESTHETIST, CERTIFIED REGISTERED

## 2018-08-15 RX ORDER — HYDROCODONE BITARTRATE AND ACETAMINOPHEN 7.5; 325 MG/1; MG/1
1 TABLET ORAL EVERY 4 HOURS PRN
Status: DISCONTINUED | OUTPATIENT
Start: 2018-08-15 | End: 2018-08-16 | Stop reason: HOSPADM

## 2018-08-15 RX ORDER — ATRACURIUM BESYLATE 10 MG/ML
INJECTION, SOLUTION INTRAVENOUS AS NEEDED
Status: DISCONTINUED | OUTPATIENT
Start: 2018-08-15 | End: 2018-08-15 | Stop reason: SURG

## 2018-08-15 RX ORDER — ONDANSETRON 2 MG/ML
4 INJECTION INTRAMUSCULAR; INTRAVENOUS ONCE AS NEEDED
Status: DISCONTINUED | OUTPATIENT
Start: 2018-08-15 | End: 2018-08-15 | Stop reason: HOSPADM

## 2018-08-15 RX ORDER — ZOLPIDEM TARTRATE 5 MG/1
5 TABLET ORAL NIGHTLY PRN
Status: DISCONTINUED | OUTPATIENT
Start: 2018-08-15 | End: 2018-08-16 | Stop reason: HOSPADM

## 2018-08-15 RX ORDER — SODIUM CHLORIDE 0.9 % (FLUSH) 0.9 %
1-10 SYRINGE (ML) INJECTION AS NEEDED
Status: DISCONTINUED | OUTPATIENT
Start: 2018-08-15 | End: 2018-08-16 | Stop reason: HOSPADM

## 2018-08-15 RX ORDER — LIDOCAINE HYDROCHLORIDE 10 MG/ML
0.5 INJECTION, SOLUTION EPIDURAL; INFILTRATION; INTRACAUDAL; PERINEURAL ONCE AS NEEDED
Status: COMPLETED | OUTPATIENT
Start: 2018-08-15 | End: 2018-08-15

## 2018-08-15 RX ORDER — PROPOFOL 10 MG/ML
VIAL (ML) INTRAVENOUS AS NEEDED
Status: DISCONTINUED | OUTPATIENT
Start: 2018-08-15 | End: 2018-08-15 | Stop reason: SURG

## 2018-08-15 RX ORDER — NEOSTIGMINE METHYLSULFATE 1 MG/ML
INJECTION, SOLUTION INTRAVENOUS AS NEEDED
Status: DISCONTINUED | OUTPATIENT
Start: 2018-08-15 | End: 2018-08-15 | Stop reason: SURG

## 2018-08-15 RX ORDER — BUPIVACAINE HYDROCHLORIDE AND EPINEPHRINE 2.5; 5 MG/ML; UG/ML
INJECTION, SOLUTION EPIDURAL; INFILTRATION; INTRACAUDAL; PERINEURAL AS NEEDED
Status: DISCONTINUED | OUTPATIENT
Start: 2018-08-15 | End: 2018-08-15 | Stop reason: HOSPADM

## 2018-08-15 RX ORDER — LABETALOL HYDROCHLORIDE 5 MG/ML
10 INJECTION, SOLUTION INTRAVENOUS EVERY 4 HOURS PRN
Status: DISCONTINUED | OUTPATIENT
Start: 2018-08-15 | End: 2018-08-16 | Stop reason: HOSPADM

## 2018-08-15 RX ORDER — FAMOTIDINE 10 MG/ML
20 INJECTION, SOLUTION INTRAVENOUS EVERY 12 HOURS SCHEDULED
Status: DISCONTINUED | OUTPATIENT
Start: 2018-08-15 | End: 2018-08-16 | Stop reason: HOSPADM

## 2018-08-15 RX ORDER — ACETAMINOPHEN 325 MG/1
650 TABLET ORAL EVERY 4 HOURS PRN
Status: DISCONTINUED | OUTPATIENT
Start: 2018-08-15 | End: 2018-08-16 | Stop reason: HOSPADM

## 2018-08-15 RX ORDER — ONDANSETRON 2 MG/ML
4 INJECTION INTRAMUSCULAR; INTRAVENOUS EVERY 6 HOURS PRN
Status: DISCONTINUED | OUTPATIENT
Start: 2018-08-15 | End: 2018-08-16 | Stop reason: HOSPADM

## 2018-08-15 RX ORDER — CEFAZOLIN SODIUM 2 G/100ML
2 INJECTION, SOLUTION INTRAVENOUS ONCE
Status: COMPLETED | OUTPATIENT
Start: 2018-08-15 | End: 2018-08-15

## 2018-08-15 RX ORDER — GLYCOPYRROLATE 0.2 MG/ML
INJECTION INTRAMUSCULAR; INTRAVENOUS AS NEEDED
Status: DISCONTINUED | OUTPATIENT
Start: 2018-08-15 | End: 2018-08-15 | Stop reason: SURG

## 2018-08-15 RX ORDER — FENTANYL CITRATE 50 UG/ML
INJECTION, SOLUTION INTRAMUSCULAR; INTRAVENOUS AS NEEDED
Status: DISCONTINUED | OUTPATIENT
Start: 2018-08-15 | End: 2018-08-15 | Stop reason: SURG

## 2018-08-15 RX ORDER — CELECOXIB 200 MG/1
200 CAPSULE ORAL ONCE
Status: COMPLETED | OUTPATIENT
Start: 2018-08-15 | End: 2018-08-15

## 2018-08-15 RX ORDER — FENTANYL CITRATE 50 UG/ML
50 INJECTION, SOLUTION INTRAMUSCULAR; INTRAVENOUS
Status: DISCONTINUED | OUTPATIENT
Start: 2018-08-15 | End: 2018-08-15 | Stop reason: HOSPADM

## 2018-08-15 RX ORDER — AMLODIPINE BESYLATE 5 MG/1
5 TABLET ORAL DAILY
Status: DISCONTINUED | OUTPATIENT
Start: 2018-08-15 | End: 2018-08-16 | Stop reason: HOSPADM

## 2018-08-15 RX ORDER — ONDANSETRON 2 MG/ML
INJECTION INTRAMUSCULAR; INTRAVENOUS AS NEEDED
Status: DISCONTINUED | OUTPATIENT
Start: 2018-08-15 | End: 2018-08-15 | Stop reason: SURG

## 2018-08-15 RX ORDER — DEXAMETHASONE SODIUM PHOSPHATE 4 MG/ML
INJECTION, SOLUTION INTRA-ARTICULAR; INTRALESIONAL; INTRAMUSCULAR; INTRAVENOUS; SOFT TISSUE AS NEEDED
Status: DISCONTINUED | OUTPATIENT
Start: 2018-08-15 | End: 2018-08-15 | Stop reason: SURG

## 2018-08-15 RX ORDER — PROMETHAZINE HYDROCHLORIDE 25 MG/1
25 TABLET ORAL ONCE AS NEEDED
Status: DISCONTINUED | OUTPATIENT
Start: 2018-08-15 | End: 2018-08-15 | Stop reason: HOSPADM

## 2018-08-15 RX ORDER — SODIUM CHLORIDE, SODIUM LACTATE, POTASSIUM CHLORIDE, CALCIUM CHLORIDE 600; 310; 30; 20 MG/100ML; MG/100ML; MG/100ML; MG/100ML
9 INJECTION, SOLUTION INTRAVENOUS CONTINUOUS
Status: DISCONTINUED | OUTPATIENT
Start: 2018-08-15 | End: 2018-08-16 | Stop reason: HOSPADM

## 2018-08-15 RX ORDER — FAMOTIDINE 20 MG/1
20 TABLET, FILM COATED ORAL ONCE
Status: COMPLETED | OUTPATIENT
Start: 2018-08-15 | End: 2018-08-15

## 2018-08-15 RX ORDER — FAMOTIDINE 20 MG/1
20 TABLET, FILM COATED ORAL EVERY 12 HOURS SCHEDULED
Status: DISCONTINUED | OUTPATIENT
Start: 2018-08-15 | End: 2018-08-16 | Stop reason: HOSPADM

## 2018-08-15 RX ORDER — BISACODYL 10 MG
10 SUPPOSITORY, RECTAL RECTAL DAILY PRN
Status: DISCONTINUED | OUTPATIENT
Start: 2018-08-15 | End: 2018-08-16 | Stop reason: HOSPADM

## 2018-08-15 RX ORDER — BISACODYL 5 MG/1
5 TABLET, DELAYED RELEASE ORAL DAILY PRN
Status: DISCONTINUED | OUTPATIENT
Start: 2018-08-15 | End: 2018-08-16 | Stop reason: HOSPADM

## 2018-08-15 RX ORDER — ACETAMINOPHEN 500 MG
1000 TABLET ORAL ONCE
Status: COMPLETED | OUTPATIENT
Start: 2018-08-15 | End: 2018-08-15

## 2018-08-15 RX ORDER — SODIUM CHLORIDE AND POTASSIUM CHLORIDE 150; 900 MG/100ML; MG/100ML
100 INJECTION, SOLUTION INTRAVENOUS CONTINUOUS
Status: DISCONTINUED | OUTPATIENT
Start: 2018-08-15 | End: 2018-08-16 | Stop reason: HOSPADM

## 2018-08-15 RX ORDER — PROMETHAZINE HYDROCHLORIDE 25 MG/ML
12.5 INJECTION, SOLUTION INTRAMUSCULAR; INTRAVENOUS ONCE AS NEEDED
Status: DISCONTINUED | OUTPATIENT
Start: 2018-08-15 | End: 2018-08-15 | Stop reason: HOSPADM

## 2018-08-15 RX ORDER — PREGABALIN 75 MG/1
75 CAPSULE ORAL ONCE
Status: COMPLETED | OUTPATIENT
Start: 2018-08-15 | End: 2018-08-15

## 2018-08-15 RX ORDER — SODIUM CHLORIDE 0.9 % (FLUSH) 0.9 %
1-10 SYRINGE (ML) INJECTION AS NEEDED
Status: DISCONTINUED | OUTPATIENT
Start: 2018-08-15 | End: 2018-08-15 | Stop reason: HOSPADM

## 2018-08-15 RX ORDER — MORPHINE SULFATE 2 MG/ML
1 INJECTION, SOLUTION INTRAMUSCULAR; INTRAVENOUS EVERY 4 HOURS PRN
Status: DISCONTINUED | OUTPATIENT
Start: 2018-08-15 | End: 2018-08-16 | Stop reason: HOSPADM

## 2018-08-15 RX ORDER — NALOXONE HCL 0.4 MG/ML
0.4 VIAL (ML) INJECTION
Status: DISCONTINUED | OUTPATIENT
Start: 2018-08-15 | End: 2018-08-16 | Stop reason: HOSPADM

## 2018-08-15 RX ORDER — CEFAZOLIN SODIUM 2 G/100ML
2 INJECTION, SOLUTION INTRAVENOUS EVERY 8 HOURS
Status: COMPLETED | OUTPATIENT
Start: 2018-08-15 | End: 2018-08-16

## 2018-08-15 RX ORDER — HYDROMORPHONE HCL 110MG/55ML
2 PATIENT CONTROLLED ANALGESIA SYRINGE INTRAVENOUS EVERY 4 HOURS PRN
Status: DISCONTINUED | OUTPATIENT
Start: 2018-08-15 | End: 2018-08-16 | Stop reason: HOSPADM

## 2018-08-15 RX ORDER — PROMETHAZINE HYDROCHLORIDE 25 MG/1
25 SUPPOSITORY RECTAL ONCE AS NEEDED
Status: DISCONTINUED | OUTPATIENT
Start: 2018-08-15 | End: 2018-08-15 | Stop reason: HOSPADM

## 2018-08-15 RX ADMIN — FAMOTIDINE 20 MG: 20 TABLET ORAL at 20:19

## 2018-08-15 RX ADMIN — ATRACURIUM BESYLATE 10 MG: 10 INJECTION, SOLUTION INTRAVENOUS at 12:25

## 2018-08-15 RX ADMIN — PROPOFOL 200 MG: 10 INJECTION, EMULSION INTRAVENOUS at 11:42

## 2018-08-15 RX ADMIN — EPHEDRINE SULFATE 10 MG: 50 INJECTION INTRAMUSCULAR; INTRAVENOUS; SUBCUTANEOUS at 12:06

## 2018-08-15 RX ADMIN — FENTANYL CITRATE 25 MCG: 50 INJECTION, SOLUTION INTRAMUSCULAR; INTRAVENOUS at 12:31

## 2018-08-15 RX ADMIN — LIDOCAINE HYDROCHLORIDE 0.3 ML: 10 INJECTION, SOLUTION EPIDURAL; INFILTRATION; INTRACAUDAL; PERINEURAL at 10:00

## 2018-08-15 RX ADMIN — NEOSTIGMINE METHYLSULFATE 2.5 MG: 1 INJECTION, SOLUTION INTRAVENOUS at 12:57

## 2018-08-15 RX ADMIN — AMLODIPINE BESYLATE 5 MG: 5 TABLET ORAL at 17:50

## 2018-08-15 RX ADMIN — SODIUM CHLORIDE, POTASSIUM CHLORIDE, SODIUM LACTATE AND CALCIUM CHLORIDE 9 ML/HR: 600; 310; 30; 20 INJECTION, SOLUTION INTRAVENOUS at 10:01

## 2018-08-15 RX ADMIN — CEFAZOLIN SODIUM 2 G: 2 INJECTION, SOLUTION INTRAVENOUS at 20:19

## 2018-08-15 RX ADMIN — SODIUM CHLORIDE, POTASSIUM CHLORIDE, SODIUM LACTATE AND CALCIUM CHLORIDE: 600; 310; 30; 20 INJECTION, SOLUTION INTRAVENOUS at 12:55

## 2018-08-15 RX ADMIN — CELECOXIB 200 MG: 200 CAPSULE ORAL at 10:11

## 2018-08-15 RX ADMIN — GLYCOPYRROLATE 0.4 MG: 0.2 INJECTION, SOLUTION INTRAMUSCULAR; INTRAVENOUS at 12:57

## 2018-08-15 RX ADMIN — FENTANYL CITRATE 50 MCG: 50 INJECTION INTRAMUSCULAR; INTRAVENOUS at 14:59

## 2018-08-15 RX ADMIN — FENTANYL CITRATE 25 MCG: 50 INJECTION, SOLUTION INTRAMUSCULAR; INTRAVENOUS at 13:10

## 2018-08-15 RX ADMIN — FAMOTIDINE 20 MG: 20 TABLET ORAL at 10:00

## 2018-08-15 RX ADMIN — ATRACURIUM BESYLATE 40 MG: 10 INJECTION, SOLUTION INTRAVENOUS at 11:42

## 2018-08-15 RX ADMIN — ONDANSETRON 4 MG: 2 INJECTION INTRAMUSCULAR; INTRAVENOUS at 12:55

## 2018-08-15 RX ADMIN — POTASSIUM CHLORIDE AND SODIUM CHLORIDE 100 ML/HR: 900; 150 INJECTION, SOLUTION INTRAVENOUS at 16:49

## 2018-08-15 RX ADMIN — ACETAMINOPHEN 1000 MG: 500 TABLET, FILM COATED ORAL at 10:00

## 2018-08-15 RX ADMIN — FENTANYL CITRATE 50 MCG: 50 INJECTION, SOLUTION INTRAMUSCULAR; INTRAVENOUS at 11:42

## 2018-08-15 RX ADMIN — FENTANYL CITRATE 50 MCG: 50 INJECTION INTRAMUSCULAR; INTRAVENOUS at 14:02

## 2018-08-15 RX ADMIN — CEFAZOLIN SODIUM 2 G: 2 INJECTION, SOLUTION INTRAVENOUS at 11:36

## 2018-08-15 RX ADMIN — PREGABALIN 75 MG: 75 CAPSULE ORAL at 10:00

## 2018-08-15 RX ADMIN — HYDROCODONE BITARTRATE AND ACETAMINOPHEN 1 TABLET: 7.5; 325 TABLET ORAL at 17:50

## 2018-08-15 RX ADMIN — ATRACURIUM BESYLATE 10 MG: 10 INJECTION, SOLUTION INTRAVENOUS at 12:00

## 2018-08-15 RX ADMIN — DEXAMETHASONE SODIUM PHOSPHATE 8 MG: 4 INJECTION, SOLUTION INTRAMUSCULAR; INTRAVENOUS at 11:59

## 2018-08-15 RX ADMIN — MUPIROCIN 1 APPLICATION: 20 OINTMENT TOPICAL at 10:00

## 2018-08-15 NOTE — ANESTHESIA POSTPROCEDURE EVALUATION
Patient: Sarai Hardy    Procedure Summary     Date:  08/15/18 Room / Location:   [facility] [address] [place] [address] [facility]    Anesthesia Start:  1136 Anesthesia Stop:      Procedure:  LUMBAR DISCECTOMY L2-3 (N/A Spine Lumbar) Diagnosis:      Surgeon:  Micah Duarte MD Provider:  Vasile Morejon MD    Anesthesia Type:  general ASA Status:  2          Anesthesia Type: general  Last vitals  BP   148/63 (08/15/18 1316)   Temp   97.7 °F (36.5 °C) (08/15/18 1316)   Pulse   87 (08/15/18 1316)   Resp   16 (08/15/18 1316)     SpO2   97 % (08/15/18 1316)     Post Anesthesia Care and Evaluation    Patient location during evaluation: PACU  Patient participation: complete - patient participated  Level of consciousness: awake and alert  Pain score: 0  Pain management: adequate  Airway patency: patent  Anesthetic complications: No anesthetic complications  PONV Status: none  Cardiovascular status: hemodynamically stable and acceptable  Respiratory status: nonlabored ventilation, acceptable, nasal cannula and oral airway  Hydration status: acceptable

## 2018-08-15 NOTE — H&P
Patient Name: Sarai Hardy  MRN: [id number]  : 1945  DOS: 8/15/2018    Attending: Micah Duarte MD    Primary Care Provider: Pam Reid MD      Chief complaint:  Low back pain     Subjective   Patient is a 73 y.o. female presented for lumbar discectomy L2-3 by Dr. Duarte under GA. She tolerated surgery well and is admitted for further medical management.     She is known to us from previous lumbar fusion in 2017.    When seen in PACU she is very drowsy. She appears comfortable and in stable condition. She did not open her eyes but gave a thumbs up when asked how she was doing.    ( Above is noted/ agree. Drowsy when seen in PACU. No distress. Follows simple commands.)wy    Allergies:  Allergies   Allergen Reactions   • Codeine Other (See Comments)     Per patient makes goofy   • Morphine And Related Other (See Comments)     Makes goofy and laughs       Meds:  Prescriptions Prior to Admission   Medication Sig Dispense Refill Last Dose   • amLODIPine (NORVASC) 5 MG tablet Take 5 mg by mouth Daily.   2018 at 0800   • estradiol (CLIMARA) 0.1 MG/24HR patch Place 1 patch on the skin 1 (One) Time Per Week.   Past Week at Unknown time   • Multiple Vitamins-Minerals (CENTRUM ADULTS PO) Take 1 tablet by mouth Daily.   2018 at 1600   • HYDROcodone-acetaminophen (NORCO) 7.5-325 MG per tablet Take 1 tablet by mouth Every 6 (Six) Hours As Needed for Moderate Pain .   2018       History:   Past Medical History:   Diagnosis Date   • Arthritis    • Back pain    • Hypertension    • Wears glasses      Past Surgical History:   Procedure Laterality Date   • BLADDER SURGERY     • COLONOSCOPY     • HYSTERECTOMY     • LUMBAR DISCECTOMY FUSION INSTRUMENTATION N/A 11/15/2017    Procedure: LUMBAR DECOMPRESSION AND FUSION WITH INSTRUMENTATION;  Surgeon: Micah Duarte MD;  Location: [facility];  Service:    • OOPHORECTOMY Bilateral      Family History   Problem Relation Age of Onset   • Breast  "cancer Neg Hx    • Ovarian cancer Neg Hx      Social History   Substance Use Topics   • Smoking status: Former Smoker     Types: Cigarettes   • Smokeless tobacco: Never Used      Comment: 20 years   • Alcohol use No   She is  with 3 children. She is retired from cleaning service.    Review of Systems  Review of systems could not be obtained due to   patient sedation status.    Vital Signs  /76   Pulse 74   Temp 97.8 °F (36.6 °C) (Temporal Artery )   Resp 16   Ht 154.9 cm (61\")   Wt 67.1 kg (148 lb)   SpO2 98%   BMI 27.96 kg/m²     Physical Exam:    General Appearance:    Drowsy, cooperative, in no acute distress   Head:    Normocephalic, without obvious abnormality, atraumatic   Ears:    Ears appear intact with no abnormalities noted   Back:   Surgical dressing CDI   Lungs:     Clear to auscultation,respirations regular, even and                   unlabored    Heart:    Regular rhythm and normal rate, normal S1 and S2, no            murmur, no gallop   Abdomen:     Normal bowel sounds, no masses, no organomegaly, soft        non-tender, non-distended, no guarding, no rebound                 tenderness   Genitalia:    Deferred   Extremities:   Moves all extremities well, no edema, no cyanosis, no              redness   Pulses:   Pulses palpable and equal bilaterally   Skin:   No bleeding, bruising or rash   Neurologic:   Cranial nerves 2 - 12 grossly intact, sensation intact. Flexion and dorsiflexion intact bilateral feet.        I reviewed the patient's new clinical results.         Results from last 7 days  Lab Units 08/13/18  1245   WBC 10*3/mm3 5.83   HEMOGLOBIN g/dL 12.6   HEMATOCRIT % 36.8   PLATELETS 10*3/mm3 241       Results from last 7 days  Lab Units 08/13/18  1245   POTASSIUM mmol/L 3.7     Lab Results   Component Value Date    HGBA1C 5.40 11/10/2017       Assessment and Plan:   Principal Problem:    S/P lumbar discectomy  Active Problems:    Back pain    HTN " (hypertension)      Plan  1. PT- ambulate  2. Pain control-prns   3. IS-encourage  [facility]  5. Bowel regimen  6. Resume home medications as appropriate  7. Monitor post-op labs  8. DC planning for home, likely tomorrow    HTN  - Continue home norvasc  - Monitor BP   - Holding parameters for BP meds  - Labetalol PRN for SBP>170    I have personally performed the evaluation on this patient. My history is consistant  with HPI obtained. My exam finding are listed above. I have personally reviewed and discussed the above formulated treatment plan with pt and . APRN.    KARRIE Stephenson  08/15/18  2:37 PM

## 2018-08-15 NOTE — ANESTHESIA PREPROCEDURE EVALUATION
Anesthesia Evaluation     Patient summary reviewed and Nursing notes reviewed                Airway   Mallampati: I  TM distance: >3 FB  Neck ROM: full  No difficulty expected  Dental      Pulmonary     breath sounds clear to auscultation  Cardiovascular     ECG reviewed  Rhythm: regular  Rate: normal    (+) hypertension,       Neuro/Psych  GI/Hepatic/Renal/Endo    (+)  GERD,      Musculoskeletal     (+) back pain,   Abdominal    Substance History      OB/GYN          Other   (+) arthritis     ROS/Med Hx Other: TTE 2017:  Mild-mod MR                Anesthesia Plan    ASA 2     general     intravenous induction   Anesthetic plan and risks discussed with patient.    Plan discussed with CRNA.

## 2018-08-15 NOTE — OP NOTE
PREOPERATIVE DIAGNOSIS: Radiculopathy secondary to L2-L3 disk herniation.    POSTOPERATIVE DIAGNOSIS: Radiculopathy secondary to L2-L3 disk herniation.    PROCEDURE PERFORMED: L2-L3 diskectomy.     SURGEON: Micah Duarte MD     ASSISTANT: ADELA Salomon     ANESTHESIA: General.    DESCRIPTION OF PROCEDURE: Patient was given 2 g of IV Ancef. She was given a general anesthetic. She was placed in the prone position. The back was prepped and draped sterilely. A midline incision was made. Paraspinal musculature was elevated. Intraoperative x-ray was done which confirmed we were at L2-L3. A left L2-L3 hemilaminotomy was performed using Kerrison, rongeurs and a bur. A flavectomy was performed. The dural sac and traversing nerve root were identified. They were retracted medially. There was a moderate-sized extruded/sequestered disk herniation. Multiple pieces of disk herniation were removed with the sucker. The annular defect was identified. It was followed down to the disk interspace. Using a micropituitary, multiple loose disk fragments from within the interspace were removed. At this point the neural elements appeared completely decompressed. The wound appeared dry at completion. Cut bony surfaces were bone waxed. The wound was copiously irrigated. Exposed dura was covered with thrombin-soaked Gelfoam. The wound was closed in layers using Vicryl. A sterile dressing was applied. Patient was transported to recovery room in stable condition.

## 2018-08-15 NOTE — INTERVAL H&P NOTE
"Pre-Op H&P (See Recent Office Note Attached for Full H&P)    Chief complaint: Low back pain into LLE    Review of Systems:    General ROS:  no fever, chills, rashes, No change since last office visit  Cardiovascular ROS: no chest pain or dyspnea on exertion.  11/2017 TTE EF NL mild/mod mr  Respiratory ROS: no cough, shortness of breath, or wheezing    Meds:    No current facility-administered medications on file prior to encounter.      Current Outpatient Prescriptions on File Prior to Encounter   Medication Sig Dispense Refill   • amLODIPine (NORVASC) 5 MG tablet Take 5 mg by mouth Daily.     • estradiol (CLIMARA) 0.1 MG/24HR patch Place 1 patch on the skin 1 (One) Time Per Week.     • Multiple Vitamins-Minerals (CENTRUM ADULTS PO) Take 1 tablet by mouth Daily.     • HYDROcodone-acetaminophen (NORCO) 7.5-325 MG per tablet Take 1 tablet by mouth Every 6 (Six) Hours As Needed for Moderate Pain .     • [DISCONTINUED] docusate sodium (COLACE) 100 MG capsule Take 1 capsule by mouth 2 (Two) Times a Day. 60 capsule 0   • [DISCONTINUED] raNITIdine (ZANTAC) 150 MG tablet Take 150 mg by mouth Every Night.         Vital Signs:  /86 (BP Location: Right arm, Patient Position: Lying)   Pulse 89   Temp 97.4 °F (36.3 °C) (Temporal Artery )   Resp 18   Ht 154.9 cm (61\")   Wt 67.1 kg (148 lb)   SpO2 98%   BMI 27.96 kg/m²     Physical Exam:    CV:  S1S2 regular rate and rhythm, no murmur appreciated               Resp:  Clear to auscultation; respirations regular, even and unlabored    Results Review:    I reviewed the patient's new clinical results.    Cancer Staging (if applicable)  Cancer Patient: __ yes _x_no __unknown; If yes, clinical stage T:__ N:__M:__, stage group or __N/A    Kimberly Cordoba, APRN  8/15/2018   10:15 AM    "

## 2018-08-15 NOTE — THERAPY EVALUATION
Acute Care - Physical Therapy Initial Evaluation  [facility]     Patient Name: Sarai Hardy  : 1945  MRN: [id number]  Today's Date: 8/15/2018   Onset of Illness/Injury or Date of Surgery: 08/15/18  Date of Referral to PT: 08/15/18  Referring Physician: MD Gerald      Admit Date: 8/15/2018    Visit Dx:     ICD-10-CM ICD-9-CM   1. Impaired functional mobility, balance, gait, and endurance Z74.09 V49.89     Patient Active Problem List   Diagnosis   • Back pain   • S/P lumbar fusion   • HTN (hypertension)   • GERD (gastroesophageal reflux disease)   • Leukocytosis, likely reactive   • Acute blood loss anemia, mild, asymptomatic   • S/P lumbar discectomy     Past Medical History:   Diagnosis Date   • Arthritis    • Back pain    • Hypertension    • Wears glasses      Past Surgical History:   Procedure Laterality Date   • BLADDER SURGERY     • COLONOSCOPY     • HYSTERECTOMY     • LUMBAR DISCECTOMY FUSION INSTRUMENTATION N/A 11/15/2017    Procedure: LUMBAR DECOMPRESSION AND FUSION WITH INSTRUMENTATION;  Surgeon: Micah Duarte MD;  Location: [facility];  Service:    • OOPHORECTOMY Bilateral         PT ASSESSMENT (last 12 hours)      Physical Therapy Evaluation     Row Name 08/15/18 1648          PT Evaluation Time/Intention    Subjective Information complains of;pain  -LR     Document Type evaluation  -LR     Mode of Treatment physical therapy;individual therapy  -LR     Patient Effort adequate  -LR     Symptoms Noted During/After Treatment increased pain  -LR     Comment Patient reports significant L LE pain with weight bearing. Notified RN.   -LR     Row Name 08/15/18 1410          General Information    Patient Profile Reviewed? yes  -LR     Onset of Illness/Injury or Date of Surgery 08/15/18  -LR     Referring Physician MD Gerald  -LR     Patient Observations agree to therapy;alert;cooperative  -LR     General Observations of Patient Patient supine in bed upon arrival. IV, Pulse ox, SCDs, exit alarm.    -LR     Prior Level of Function min assist:;all household mobility;community mobility;gait;transfer;bed mobility;ADL's;home management;cooking;cleaning;shopping;using stairs;independent:;driving   all mobility limited by pain  -LR     Equipment Currently Used at Home walker, rolling   not currently using AD  -LR     Pertinent History of Current Functional Problem Patient presents for surgical management of persistent and progressive low back and L LE pain that has failed to improve with conservative management.   -LR     Existing Precautions/Restrictions fall;spinal  -LR     Equipment Issued to Patient --   none  -LR     Equipment Ordered for Patient --   none  -LR     Risks Reviewed patient:;nausea/vomiting;LOB;dizziness;increased discomfort;lines disloged  -LR     Benefits Reviewed patient:;improve function;increase independence;increase strength;increase balance;decrease risk of DVT;decrease pain;increase knowledge  -LR     Barriers to Rehab previous functional deficit  -LR     Row Name 08/15/18 1648          Relationship/Environment    Primary Source of Support/Comfort spouse   spouse available at all times, unable to provide assist  -LR     Lives With spouse  -LR     Row Name 08/15/18 1648          Resource/Environmental Concerns    Current Living Arrangements home/apartment/condo  -LR     Resource/Environmental Concerns none  -LR     Transportation Concerns car, none  -LR     Row Name 08/15/18 1648          Cognitive Assessment/Intervention- PT/OT    Orientation Status (Cognition) oriented x 4  -LR     Follows Commands (Cognition) WFL;follows one step commands;over 90% accuracy;verbal cues/prompting required  -LR     Safety Deficit (Cognitive) mild deficit;at risk behavior observed;awareness of need for assistance;insight into deficits/self awareness;safety precautions awareness;safety precautions follow-through/compliance;judgment  -LR     Row Name 08/15/18 1648          Safety Issues, Functional Mobility     Safety Issues Affecting Function (Mobility) insight into deficits/self awareness;safety precaution awareness;safety precautions follow-through/compliance;sequencing abilities;impulsivity  -LR     Row Name 08/15/18 1648          Bed Mobility Assessment/Treatment    Bed Mobility Assessment/Treatment supine-sit;sit-supine  -LR     Supine-Sit [place]er (Bed Mobility) verbal cues;minimum assist (75% patient effort)  -LR     Sit-Supine Freer (Bed Mobility) verbal cues;minimum assist (75% patient effort);2 person assist  -LR     Bed Mobility, Safety Issues impaired trunk control for bed mobility;decreased use of arms for pushing/pulling  -LR     Assistive Device (Bed Mobility) head of bed elevated;bed rails  -LR     Comment (Bed Mobility) Verbal cues to flex knees and roll hips and shoulders at same time onto L side. Verbal cues to drop LEs off EOB and to push up from bed to raise trunk into sitting. Min assist to raise trunk into sitting. Verbal cues to lay back down onto L side while lifting LEs up into bed. Verbal cues to roll hips and shoulders at same time into supine. Min assist required at trunk and at LEs.   -LR     Row Name 08/15/18 1648          Transfer Assessment/Treatment    Transfer Assessment/Treatment sit-stand transfer;stand-sit transfer  -LR     Comment (Transfers) Verbal cues to push up from bed to stand and to reach back for bed to lower into sitting. Verbal cues to limit trunk flexion during t/f. Performed x3. Patient c/o severe L LE pain in weight bearing. Unable to progress to ambulation d/t L LE pain.   -LR     Sit-Stand Freer (Transfers) verbal cues;contact guard;2 person assist  -LR     Stand-Sit Freer (Transfers) verbal cues;contact guard;2 person assist  -LR     Row Name 08/15/18 1648          Sit-Stand Transfer    Assistive Device (Sit-Stand Transfers) walker, front-wheeled  -LR     Row Name 08/15/18 1648          Stand-Sit Transfer    Assistive Device (Stand-Sit  Transfers) walker, front-wheeled  -[facility] Name 08/15/18 1648          Gait/Stairs Assessment/Training    [place] Level (Gait) not tested  -LR     Comment (Gait/Stairs) Patient unable to tolerate weight bearing on L LE to allow for ambulation.   -     Row Name 08/15/18 1648          General ROM    RT Lower Ext Comment  -LR     LT Lower Ext Comment  -[facility] Name 08/15/18 1648          Right Lower Ext    RT Lower Extremity Comments R LE AROM WFL  -LR     [facility] Name 08/15/18 1648          Left Lower Ext    LT Lower Extremity Comments L LE AROM WFL  -[facility] Name 08/15/18 1648          General Assessment (Manual Muscle Testing)    General Manual Muscle Testing (MMT) Assessment lower extremity strength deficits identified  -LR     Row Name 08/15/18 1648          Lower Extremity (Manual Muscle Testing)    Lower Extremity: Manual Muscle Testing (MMT) left knee strength deficit;right knee strength deficit  -LR     Comment, MMT: Lower Extremity B LEs functionally 4-/5, patient able to actively DF bilaterally  -[facility] Name 08/15/18 1648          Motor Assessment/Intervention    Additional Documentation Therapeutic Exercise (Group);Therapeutic Exercise Interventions (Group)  -LR     Row Name 08/15/18 1648          Therapeutic Exercise    05341 - PT Therapeutic Activity Minutes 10  -LR     Row Name 08/15/18 1648          Sensory Assessment/Intervention    Sensory General Assessment no sensation deficits identified   denies numbness/tingling;ligh touch equal/intact  -LR     Row Name 08/15/18 1648          Vision Assessment/Intervention    Visual Impairment/Limitations corrective lenses full time  -LR     Row Name 08/15/18 1648          Pain Assessment    Additional Documentation Pain Scale: Numbers Pre/Post-Treatment (Group)  -LR     Row Name 08/15/18 1648          Pain Scale: Numbers Pre/Post-Treatment    Pain Scale: Numbers, Pretreatment 8/10   back  -LR     Pain Scale: Numbers, Post-Treatment 9/10  -LR      Pain Location - Side Left  -LR     Pain Location --   leg  -LR     Pain Intervention(s) Repositioned;Ambulation/increased activity  -LR     Row Name             Wound 08/15/18 1239 Other (See comments) back incision    Wound - Properties Group Date first assessed: 08/15/18  -KS Time first assessed: 1239  -KS Side: Other (See comments)  -KS Location: back  -KS Type: incision  -KS    Row Name 08/15/18 1648          Coping    Observed Emotional State accepting;cooperative  -LR     Verbalized Emotional State acceptance  -LR     Row Name 08/15/18 1648          Plan of Care Review    Plan of Care Reviewed With patient  -LR     Row Name 08/15/18 1648          Physical Therapy Clinical Impression    Date of Referral to PT 08/15/18  -LR     PT Diagnosis (PT Clinical Impression) radiculopathy d/t L2-3 disc herniation/ s/p L2-3 discectomy  -LR     Prognosis (PT Clinical Impression) fair  -LR     Patient/Family Goals Statement (PT Clinical Impression) decrease pain  -LR     Criteria for Skilled Interventions Met (PT Clinical Impression) yes;treatment indicated  -LR     Rehab Potential (PT Clinical Summary) fair, will monitor progress closely  -LR     Care Plan Review (PT) care plan/treatment goals reviewed;risks/benefits reviewed;evaluation/treatment results reviewed;current/potential barriers reviewed;patient/other agree to care plan  -LR     Row Name 08/15/18 1648          Physical Therapy Goals    Bed Mobility Goal Selection (PT) bed mobility, PT goal 1  -LR     Transfer Goal Selection (PT) transfer, PT goal 1  -LR     Gait Training Goal Selection (PT) gait training, PT goal 1  -LR     Row Name 08/15/18 1648          Bed Mobility Goal 1 (PT)    Activity/Assistive Device (Bed Mobility Goal 1, PT) sit to supine/supine to sit  -LR     [place] Level/Cues Needed (Bed Mobility Goal 1, PT) conditional independence  -LR     Time Frame (Bed Mobility Goal 1, PT) long term goal (LTG);5 days  -LR     Progress/Outcomes (Bed Mobility  Goal 1, PT) goal ongoing  -LR     Row Name 08/15/18 1648          Transfer Goal 1 (PT)    Activity/Assistive Device (Transfer Goal 1, PT) sit-to-stand/stand-to-sit;walker, rolling  -LR     [place] Level/Cues Needed (Transfer Goal 1, PT) conditional independence  -LR     Time Frame (Transfer Goal 1, PT) long term goal (LTG);5 days  -LR     Progress/Outcome (Transfer Goal 1, PT) goal ongoing  -LR     Row Name 08/15/18 1648          Gait Training Goal 1 (PT)    Activity/Assistive Device (Gait Training Goal 1, PT) gait (walking locomotion);walker, rolling  -LR     [place] Level (Gait Training Goal 1, PT) conditional independence  -LR     Distance (Gait Goal 1, PT) 500 feet  -LR     Time Frame (Gait Training Goal 1, PT) long term goal (LTG);5 days  -LR     Progress/Outcome (Gait Training Goal 1, PT) goal ongoing  -LR     Row Name 08/15/18 1648          Positioning and Restraints    Pre-Treatment Position in bed  -LR     Post Treatment Position bed  -LR     In Bed notified nsg;supine;call light within reach;exit alarm on;encouraged to call for assist;side rails up x2;SCD pump applied  -LR     Row Name 08/15/18 1648          Living Environment    Home Accessibility wheelchair accessible;tub/shower is not walk in   no steps to enter home  -LR       User Key  (r) = Recorded By, (t) = Taken By, (c) = Cosigned By    Initials Name Provider Type    LR Khushboo Aviles, PT Physical Therapist    Yolanda Jones RN Registered Nurse          Physical Therapy Education     Title: PT OT SLP Therapies (Active)     Topic: Physical Therapy (Done)     Point: Mobility training (Done)    Learning Progress Summary     Learner Status Readiness Method Response Comment Documented by    Patient Done Acceptance E,D,H VU,NR Issued and reviewed written/illustrated HEP and spinal precautions handout. Educated on correct log rolling technique, correct t/f technique, and progression of POC. LR 08/15/18 5182          Point: Home  exercise program (Done)    Learning Progress Summary     Learner Status Readiness Method Response Comment Documented by    Patient Done Acceptance E,D,H VU,NR Issued and reviewed written/illustrated HEP and spinal precautions handout. Educated on correct log rolling technique, correct t/f technique, and progression of POC. LR 08/15/18 1743          Point: Body mechanics (Done)    Learning Progress Summary     Learner Status Readiness Method Response Comment Documented by    Patient Done Acceptance E,D,H VU,NR Issued and reviewed written/illustrated HEP and spinal precautions handout. Educated on correct log rolling technique, correct t/f technique, and progression of POC. LR 08/15/18 1743          Point: Precautions (Done)    Learning Progress Summary     Learner Status Readiness Method Response Comment Documented by    Patient Done Acceptance E,D,H VU,NR Issued and reviewed written/illustrated HEP and spinal precautions handout. Educated on correct log rolling technique, correct t/f technique, and progression of POC.  08/15/18 1743                      User Key     Initials Effective Dates Name Provider Type Discipline     06/19/15 -  Khushboo Aviles, PT Physical Therapist PT                PT Recommendation and Plan  Anticipated Discharge Disposition (PT): home with assist, home with home health  Planned Therapy Interventions (PT Eval): balance training, bed mobility training, gait training, home exercise program, patient/family education, strengthening, transfer training  Outcome Summary/Treatment Plan (PT)  Anticipated Equipment Needs at Discharge (PT):  (TBD)  Anticipated Discharge Disposition (PT): home with assist, home with home health  Plan of Care Reviewed With: patient  Progress: improving  Outcome Summary: Patient unable to ambulate this PM, c/o severe L leg pain upon standing. Performed sit<->stand t/f x3 but patient still had severe L LE pain and reported she could not tolerate ambulation.  Notified RN. Plan is d/c home with family and HHPT. Will continue to progress as able.           Outcome Measures     Row Name 08/15/18 1648             How much help from another person do you currently need...    Turning from your back to your side while in flat bed without using bedrails? 3  -LR      Moving from lying on back to sitting on the side of a flat bed without bedrails? 3  -LR      Moving to and from a bed to a chair (including a wheelchair)? 2  -LR      Standing up from a chair using your arms (e.g., wheelchair, bedside chair)? 3  -LR      Climbing 3-5 steps with a railing? 1  -LR      To walk in hospital room? 1  -LR      AM-PAC 6 Clicks Score 13  -LR         Functional Assessment    Outcome Measure Options AM-PAC 6 Clicks Basic Mobility (PT)  -LR        User Key  (r) = Recorded By, (t) = Taken By, (c) = Cosigned By    Initials Name Provider Type    Khushboo Barnard, PT Physical Therapist           Time Calculation:         PT Charges     Row Name 08/15/18 1648             Time Calculation    Start Time 1648  -LR      PT Received On 08/15/18  -LR      PT Goal Re-Cert Due Date 08/25/18  -LR         Time Calculation- PT    Total Timed Code Minutes- PT 10 minute(s)  -LR         Timed Charges    85151 - PT Therapeutic Activity Minutes 10  -LR        User Key  (r) = Recorded By, (t) = Taken By, (c) = Cosigned By    Initials Name Provider Type    Khushboo Barnard, PT Physical Therapist        Therapy Suggested Charges     Code   Minutes Charges    16015 (CPT®) Hc Pt Neuromusc Re Education Ea 15 Min      19155 (CPT®) Hc Pt Ther Proc Ea 15 Min      02328 (CPT®) Hc Gait Training Ea 15 Min      87080 (CPT®) Hc Pt Therapeutic Act Ea 15 Min 10 1    80363 (CPT®) Hc Pt Manual Therapy Ea 15 Min      47539 (CPT®) Hc Pt Iontophoresis Ea 15 Min      51720 (CPT®) Hc Pt Elec Stim Ea-Per 15 Min      55354 (CPT®) Hc Pt Ultrasound Ea 15 Min      33960 (CPT®) Hc Pt Self Care/Mgmt/Train Ea 15 Min      87197  (CPT®) Hc Pt Prosthetic (S) Train Initial Encounter, Each 15 Min      58408 (CPT®) Hc Pt Orthotic(S)/Prosthetic(S) Encounter, Each 15 Min      39815 (CPT®) Hc Orthotic(S) Mgmt/Train Initial Encounter, Each 15min      Total  10 1        Therapy Charges for Today     Code Description Service Date Service Provider Modifiers Qty    25837086237 HC PT MOBILITY CURRENT 8/15/2018 Khushboo Aviles, PT GP, CK 1    53363137975 HC PT MOBILITY PROJECTED 8/15/2018 Khushboo Aviles, PT GP, CI 1    07073296488 HC PT THERAPEUTIC ACT EA 15 MIN 8/15/2018 Khushboo Aviles, PT GP 1    35305789364 HC PT THER SUPP EA 15 MIN 8/15/2018 Khushboo Aviles, PT GP 2    40720347160 HC PT EVAL MOD COMPLEXITY 3 8/15/2018 Khushboo Aviles, PT GP 1          PT G-Codes  PT Professional Judgement Used?: Yes  Outcome Measure Options: AM-PAC 6 Clicks Basic Mobility (PT)  Score: 13  Functional Limitation: Mobility: Walking and moving around  Mobility: Walking and Moving Around Current Status (): At least 40 percent but less than 60 percent impaired, limited or restricted  Mobility: Walking and Moving Around Goal Status (): At least 1 percent but less than 20 percent impaired, limited or restricted      Khushboo Aviles, PT  8/15/2018

## 2018-08-15 NOTE — PLAN OF CARE
Problem: Patient Care Overview  Goal: Plan of Care Review  Outcome: Ongoing (interventions implemented as appropriate)   08/15/18 0284   Coping/Psychosocial   Plan of Care Reviewed With patient   Plan of Care Review   Progress improving   OTHER   Outcome Summary Patient unable to ambulate this PM, c/o severe L leg pain upon standing. Performed sit<->stand t/f x3 but patient still had severe L LE pain and reported she could not tolerate ambulation. Notified RN. Plan is d/c home with family and HHPT. Will continue to progress as able.

## 2018-08-15 NOTE — ANESTHESIA PROCEDURE NOTES
Airway  Urgency: elective    Airway not difficult    General Information and Staff    Patient location during procedure: OR    Indications and Patient Condition  Indications for airway management: airway protection    Preoxygenated: yes  MILS not maintained throughout  Mask difficulty assessment: 1 - vent by mask    Final Airway Details  Final airway type: endotracheal airway      Successful airway: ETT  Cuffed: yes   Successful intubation technique: direct laryngoscopy  Facilitating devices/methods: intubating stylet  Endotracheal tube insertion site: oral  Blade: Carole  Blade size: #3  ETT size: 7.5 mm  Cormack-Lehane Classification: grade I - full view of glottis  Placement verified by: chest auscultation and capnometry   Measured from: lips  ETT to lips (cm): 21  Number of attempts at approach: 1    Additional Comments  Negative epigastric sounds, Breath sound equal bilaterally with symmetric chest rise and fall

## 2018-08-15 NOTE — PLAN OF CARE
Problem: Patient Care Overview  Goal: Plan of Care Review  Outcome: Ongoing (interventions implemented as appropriate)   08/15/18 1703   Coping/Psychosocial   Plan of Care Reviewed With patient   Plan of Care Review   Progress improving   OTHER   Outcome Summary Arrived on floor at 1630. No C/O pain. Dressing clean, dry and intact. Ambulated to BR w/ Ax1. Vital signs stable.       Problem: Laminectomy/Foraminotomy/Discectomy (Adult)  Goal: Signs and Symptoms of Listed Potential Problems Will be Absent, Minimized or Managed (Laminectomy/Foraminotomy/Discectomy)  Outcome: Outcome(s) achieved Date Met: 08/15/18   08/15/18 1703   Goal/Outcome Evaluation   Problems Assessed (Laminectomy/Laminotomy/Discectomy) all   Problems Present (Laminectomy/otomy) pain;functional decline/self-care deficit       Problem: Fall Risk (Adult)  Goal: Identify Related Risk Factors and Signs and Symptoms  Outcome: Ongoing (interventions implemented as appropriate)   08/15/18 1703   Fall Risk (Adult)   Related Risk Factors (Fall Risk) sensory deficits;environment unfamiliar;gait/mobility problems;age-related changes     Goal: Absence of Fall  Outcome: Ongoing (interventions implemented as appropriate)   08/15/18 1703   Fall Risk (Adult)   Absence of Fall making progress toward outcome

## 2018-08-16 VITALS
WEIGHT: 148 LBS | TEMPERATURE: 98.3 F | DIASTOLIC BLOOD PRESSURE: 66 MMHG | BODY MASS INDEX: 27.94 KG/M2 | SYSTOLIC BLOOD PRESSURE: 133 MMHG | HEIGHT: 61 IN | HEART RATE: 76 BPM | RESPIRATION RATE: 16 BRPM | OXYGEN SATURATION: 95 %

## 2018-08-16 PROBLEM — G89.18 ACUTE POSTOPERATIVE PAIN: Status: ACTIVE | Noted: 2018-08-16

## 2018-08-16 LAB
ANION GAP SERPL CALCULATED.3IONS-SCNC: 7 MMOL/L (ref 3–11)
BUN BLD-MCNC: 16 MG/DL (ref 9–23)
BUN/CREAT SERPL: 21.3 (ref 7–25)
CALCIUM SPEC-SCNC: 8.5 MG/DL (ref 8.7–10.4)
CHLORIDE SERPL-SCNC: 105 MMOL/L (ref 99–109)
CO2 SERPL-SCNC: 23 MMOL/L (ref 20–31)
CREAT BLD-MCNC: 0.75 MG/DL (ref 0.6–1.3)
DEPRECATED RDW RBC AUTO: 43.6 FL (ref 37–54)
ERYTHROCYTE [DISTWIDTH] IN BLOOD BY AUTOMATED COUNT: 14.2 % (ref 11.3–14.5)
GFR SERPL CREATININE-BSD FRML MDRD: 76 ML/MIN/1.73
GLUCOSE BLD-MCNC: 130 MG/DL (ref 70–100)
HCT VFR BLD AUTO: 34.9 % (ref 34.5–44)
HGB BLD-MCNC: 11.8 G/DL (ref 11.5–15.5)
MCH RBC QN AUTO: 28.4 PG (ref 27–31)
MCHC RBC AUTO-ENTMCNC: 33.8 G/DL (ref 32–36)
MCV RBC AUTO: 84.1 FL (ref 80–99)
PLATELET # BLD AUTO: 251 10*3/MM3 (ref 150–450)
PMV BLD AUTO: 9.9 FL (ref 6–12)
POTASSIUM BLD-SCNC: 4.3 MMOL/L (ref 3.5–5.5)
RBC # BLD AUTO: 4.15 10*6/MM3 (ref 3.89–5.14)
SODIUM BLD-SCNC: 135 MMOL/L (ref 132–146)
WBC NRBC COR # BLD: 16.45 10*3/MM3 (ref 3.5–10.8)

## 2018-08-16 PROCEDURE — A9270 NON-COVERED ITEM OR SERVICE: HCPCS | Performed by: ORTHOPAEDIC SURGERY

## 2018-08-16 PROCEDURE — A9270 NON-COVERED ITEM OR SERVICE: HCPCS | Performed by: NURSE PRACTITIONER

## 2018-08-16 PROCEDURE — 63710000001 AMLODIPINE 5 MG TABLET: Performed by: NURSE PRACTITIONER

## 2018-08-16 PROCEDURE — 25810000003 SODIUM CHLORIDE 0.9 % WITH KCL 20 MEQ 20-0.9 MEQ/L-% SOLUTION: Performed by: ORTHOPAEDIC SURGERY

## 2018-08-16 PROCEDURE — 63710000001 HYDROCODONE-ACETAMINOPHEN 7.5-325 MG TABLET: Performed by: ORTHOPAEDIC SURGERY

## 2018-08-16 PROCEDURE — 25010000003 CEFAZOLIN IN DEXTROSE 2-4 GM/100ML-% SOLUTION: Performed by: ORTHOPAEDIC SURGERY

## 2018-08-16 PROCEDURE — G0378 HOSPITAL OBSERVATION PER HR: HCPCS

## 2018-08-16 PROCEDURE — 85027 COMPLETE CBC AUTOMATED: CPT | Performed by: NURSE PRACTITIONER

## 2018-08-16 PROCEDURE — 63710000001 FAMOTIDINE 20 MG TABLET: Performed by: ORTHOPAEDIC SURGERY

## 2018-08-16 PROCEDURE — 97116 GAIT TRAINING THERAPY: CPT

## 2018-08-16 PROCEDURE — 80048 BASIC METABOLIC PNL TOTAL CA: CPT | Performed by: NURSE PRACTITIONER

## 2018-08-16 RX ORDER — DOCUSATE SODIUM 100 MG/1
100 CAPSULE, LIQUID FILLED ORAL 2 TIMES DAILY
Qty: 60 CAPSULE | Refills: 0 | Status: SHIPPED | OUTPATIENT
Start: 2018-08-16 | End: 2019-05-10

## 2018-08-16 RX ORDER — HYDROCODONE BITARTRATE AND ACETAMINOPHEN 7.5; 325 MG/1; MG/1
1 TABLET ORAL EVERY 6 HOURS PRN
Start: 2018-08-16 | End: 2019-05-10

## 2018-08-16 RX ADMIN — POTASSIUM CHLORIDE AND SODIUM CHLORIDE 100 ML/HR: 900; 150 INJECTION, SOLUTION INTRAVENOUS at 05:18

## 2018-08-16 RX ADMIN — FAMOTIDINE 20 MG: 20 TABLET ORAL at 08:34

## 2018-08-16 RX ADMIN — CEFAZOLIN SODIUM 2 G: 2 INJECTION, SOLUTION INTRAVENOUS at 03:17

## 2018-08-16 RX ADMIN — AMLODIPINE BESYLATE 5 MG: 5 TABLET ORAL at 08:34

## 2018-08-16 RX ADMIN — HYDROCODONE BITARTRATE AND ACETAMINOPHEN 1 TABLET: 7.5; 325 TABLET ORAL at 08:54

## 2018-08-16 NOTE — PROGRESS NOTES
Discharge Planning Assessment  [facility]     Patient Name: Sarai Hardy  MRN: [id number]  Today's Date: 8/16/2018    Admit Date: 8/15/2018          Discharge Needs Assessment     Row Name 08/16/18 1103       Living Environment    Lives With spouse    Current Living Arrangements home/apartment/condo    Primary Care Provided by self    Family Caregiver if Needed spouse    Able to Return to Prior Arrangements yes       Transition Planning    Patient/Family Anticipates Transition to home    Transportation Anticipated family or friend will provide       Discharge Needs Assessment    Equipment Currently Used at Home walker, rolling;commode;grab bar            Discharge Plan     Row Name 08/16/18 1105       Plan    Plan Home w/ HH    Patient/Family in Agreement with Plan yes    Plan Comments Spoke with patient and  at bedside. They live in a one story house in [facility] she was independent with ADL's and has a RW to assist with mobility when needed. Per therapy recs she would benefit from therapy post dc. Patient agreeable and would like [facility] to provide. Referral made to [facility] for HH/PT/OT. She is aware of patient's pending dc today. No other needs identified. Plan is home w/ HH. CM following.     Final Discharge Disposition Code 06 - home with home health care        Destination     No service coordination in this encounter.      Durable Medical Equipment     No service coordination in this encounter.      Dialysis/Infusion     No service coordination in this encounter.      Home Medical Care     No service coordination in this encounter.      Social Care     No service coordination in this encounter.        Expected Discharge Date and Time     Expected Discharge Date Expected Discharge Time    Aug 17, 2018               Demographic Summary     Row Name 08/16/18 1102       General Information    Arrived From home    Reason for Consult discharge planning            Functional Status     Row Name 08/16/18 1106        Functional Status    Usual Activity Tolerance moderate    Current Activity Tolerance fair            Psychosocial    No documentation.           Abuse/Neglect    No documentation.           Legal    No documentation.           Substance Abuse    No documentation.           Patient Forms    No documentation.         Marizol Roblero RN

## 2018-08-16 NOTE — THERAPY TREATMENT NOTE
Acute Care - Physical Therapy Treatment Note  [facility]     Patient Name: Sarai Hardy  : 1945  MRN: [id number]  Today's Date: 2018  Onset of Illness/Injury or Date of Surgery: 08/15/18  Date of Referral to PT: 08/15/18  Referring Physician: MD Gerald    Admit Date: 8/15/2018    Visit Dx:    ICD-10-CM ICD-9-CM   1. Impaired functional mobility, balance, gait, and endurance Z74.09 V49.89     Patient Active Problem List   Diagnosis   • Back pain   • S/P lumbar fusion   • HTN (hypertension)   • GERD (gastroesophageal reflux disease)   • Leukocytosis, likely reactive   • Acute blood loss anemia, mild, asymptomatic   • S/P lumbar discectomy       Therapy Treatment          Rehabilitation Treatment Summary     Row Name 18 0842             Treatment Time/Intention    Discipline physical therapist  -LR      Document Type therapy note (daily note)  -LR      Subjective Information complains of;pain   reports leg pain has resolved since PT eval last night  -LR      Mode of Treatment physical therapy;individual therapy  -LR      Patient/Family Observations Patient sitting reclined [facility] on arrival. Exit alarm.   -LR      Care Plan Review care plan/treatment goals reviewed;risks/benefits reviewed;current/potential barriers reviewed;patient/other agree to care plan  -LR      Patient Effort excellent  -LR      Existing Precautions/Restrictions fall;spinal  -LR      Recorded by [LR] Khushboo Aviles, PT 1833      Row Name 18 0842             Cognitive Assessment/Intervention- PT/OT    Orientation Status (Cognition) oriented x 4  -LR      Follows Commands (Cognition) WFL;follows one step commands;over 90% accuracy;verbal cues/prompting required;repetition of directions required  -LR      Safety Deficit (Cognitive) safety precautions awareness;safety precautions follow-through/compliance  -LR      Recorded by [LR] Khushboo Aviles, PT 1833      Row Name 18 0842              Safety Issues, Functional Mobility    Safety Issues Affecting Function (Mobility) safety precaution awareness;safety precautions follow-through/compliance  -LR      Recorded by [LR] Khushboo Aviles, PT 08/16/18 0933      Row Name 08/16/18 0842             Bed Mobility Assessment/Treatment    Supine-Sit Reading (Bed Mobility) not tested   [facility] on arrival.   -LR      Sit-Supine Reading (Bed Mobility) verbal cues;contact guard  -LR      Bed Mobility, Safety Issues decreased use of legs for bridging/pushing  -LR      Assistive Device (Bed Mobility) head of bed elevated;bed rails  -LR      Comment (Bed Mobility) Verbal cues for correct log rolling technique and to keep hips and shoulders aligned throughout.   -LR      Recorded by [LR] Khushboo Aviles, PT 08/16/18 0933      Row Name 08/16/18 0842             Transfer Assessment/Treatment    Transfer Assessment/Treatment sit-stand transfer;stand-sit transfer  -LR      Comment (Transfers) Verbal cues for correct hand placement with t/f and to limit trunk flexion during t/f.   -LR      Recorded by [LR] Khushboo Aviles, PT 08/16/18 0933      Row Name 08/16/18 0842             Sit-Stand Transfer    Sit-Stand Reading (Transfers) verbal cues;stand by assist  -LR      Assistive Device (Sit-Stand Transfers) walker, front-wheeled  -LR      Recorded by [LR] Khushboo Aviles, PT 08/16/18 0933      Row Name 08/16/18 0842             Stand-Sit Transfer    Stand-Sit Reading (Transfers) verbal cues;stand by assist  -LR      Assistive Device (Stand-Sit Transfers) walker, front-wheeled  -LR      Recorded by [LR] Khushboo Aviles, PT 08/16/18 0933      Row Name 08/16/18 0842             Gait/Stairs Assessment/Training    47761 - Gait Training Minutes  10  -LR      Reading Level (Gait) verbal cues;contact guard  -LR      Assistive Device (Gait) walker, front-wheeled  -LR      Distance in Feet (Gait) 300  -LR      Pattern (Gait)  step-through  -LR      Deviations/Abnormal Patterns (Gait) bilateral deviations;codie decreased;gait speed decreased;stride length decreased  -LR      Comment (Gait/Stairs) Patient ambulated with step through gait pattern at slow pace. Verbal cues for increased LE weight bearing, decreased UE weight bearing, and upright posture. Improved with cues for correction. Denied leg pain throughout ambulation. Gait limited by pain.   -LR      Recorded by [LR] Khushboo Aviles, PT 08/16/18 0933      Row Name 08/16/18 0842             Motor Skills Assessment/Interventions    Additional Documentation Therapeutic Exercise (Group);Therapeutic Exercise Interventions (Group)  -LR      Recorded by [LR] Khushboo Aviles, PT 08/16/18 0933      Row Name 08/16/18 0842             Therapeutic Exercise    57905 - PT Therapeutic Exercise Minutes 6  -LR      Recorded by [LR] Khushboo Aviles, PT 08/16/18 0933      Row Name 08/16/18 0842             Therapeutic Exercise    Lower Extremity (Therapeutic Exercise) gluteal sets;heel slides, bilateral;quad sets, bilateral  -LR      Lower Extremity Range of Motion (Therapeutic Exercise) hip internal/external rotation, bilateral;ankle dorsiflexion/plantar flexion, bilateral  -LR      Core Strength (Therapeutic Exercise) abdominal bracing  -LR      Exercise Type (Therapeutic Exercise) AROM (active range of motion);isometric contraction, static;isotonic contraction, concentric  -LR      Position (Therapeutic Exercise) supine  -LR      Sets/Reps (Therapeutic Exercise) x10 reps each  -LR      Comment (Therapeutic Exercise) cues for technique  -LR      Recorded by [LR] Khushboo Aviles, PT 08/16/18 0933      Row Name 08/16/18 0842             Positioning and Restraints    Pre-Treatment Position sitting in chair/recliner  -LR      Post Treatment Position bed  -LR      In Bed notified nsg;supine;call light within reach;encouraged to call for assist;exit alarm on;side rails up x2   -LR      Recorded by [LR] Khushboo Aviles, PT 08/16/18 0933      Row Name 08/16/18 0842             Pain Assessment    Additional Documentation Pain Scale: Numbers Pre/Post-Treatment (Group)  -LR      Recorded by [LR] Khushboo Aviles, PT 08/16/18 0933      Row Name 08/16/18 0842             Pain Scale: Numbers Pre/Post-Treatment    Pain Scale: Numbers, Pretreatment 4/10  -LR      Pain Scale: Numbers, Post-Treatment 5/10  -LR      Pain Location - Orientation lower  -LR      Pain Location back  -LR      Pain Intervention(s) Repositioned;Ambulation/increased activity  -LR      Recorded by [LR] Khushboo Aviles, PT 08/16/18 0933      Row Name                Wound 08/15/18 1239 Other (See comments) back incision    Wound - Properties Group Date first assessed: 08/15/18 [KS] Time first assessed: 1239 [KS] Side: Other (See comments) [KS] Location: back [KS] Type: incision [KS] Recorded by:  [KS] Yolanda Mcdermott RN 08/15/18 1239    Row Name 08/16/18 0842             Coping    Observed Emotional State accepting;cooperative  -LR      Verbalized Emotional State acceptance  -LR      Recorded by [LR] Khushboo Aviles, PT 08/16/18 0933      Row Name 08/16/18 0842             Plan of Care Review    Plan of Care Reviewed With patient  -LR      Recorded by [LR] Khushboo Aviles, PT 08/16/18 0933      Row Name 08/16/18 0842             Outcome Summary/Treatment Plan (PT)    Daily Summary of Progress (PT) progress toward functional goals as expected  -LR      Recorded by [LR] Khushboo Aviles, PT 08/16/18 0933        User Key  (r) = Recorded By, (t) = Taken By, (c) = Cosigned By    Initials Name Effective Dates Discipline    LR Khushboo Aviles, PT 06/19/15 -  PT    Yolanda Jones RN 06/16/16 -  Nurse          Wound 08/15/18 1239 Other (See comments) back incision (Active)   Dressing Appearance dry;intact;area marked 8/15/2018  8:20 PM   Dressing Care, Wound border dressing 8/15/2018   8:20 PM             Physical Therapy Education     Title: PT OT SLP Therapies (Active)     Topic: Physical Therapy (Done)     Point: Mobility training (Done)    Learning Progress Summary     Learner Status Readiness Method Response Comment Documented by    Patient Done Acceptance MATEUS LARANR Educated on spinal precautions, correct log roll technique, correct gait mechanics, and correct car t/f technique.  08/16/18 0933     Done Acceptance E,MATEUS,H VU,NR Issued and reviewed written/illustrated HEP and spinal precautions handout. Educated on correct log rolling technique, correct t/f technique, and progression of POC.  08/15/18 4793          Point: Home exercise program (Done)    Learning Progress Summary     Learner Status Readiness Method Response Comment Documented by    Patient Done Acceptance MATEUS LARANR Educated on spinal precautions, correct log roll technique, correct gait mechanics, and correct car t/f technique.  08/16/18 0933     Done Acceptance MATEUS LARA,H GINA,NR Issued and reviewed written/illustrated HEP and spinal precautions handout. Educated on correct log rolling technique, correct t/f technique, and progression of POC.  08/15/18 6813          Point: Body mechanics (Done)    Learning Progress Summary     Learner Status Readiness Method Response Comment Documented by    Patient Done Acceptance MATEUS LARA,NR Educated on spinal precautions, correct log roll technique, correct gait mechanics, and correct car t/f technique.  08/16/18 0933     Done Acceptance MARCO,MATEUS,H VU,NR Issued and reviewed written/illustrated HEP and spinal precautions handout. Educated on correct log rolling technique, correct t/f technique, and progression of POC.  08/15/18 3111          Point: Precautions (Done)    Learning Progress Summary     Learner Status Readiness Method Response Comment Documented by    Patient Done Acceptance MATEUS LARA VU,NR Educated on spinal precautions, correct log roll technique, correct gait mechanics, and correct car  t/f technique. LR 08/16/18 0933     Done Acceptance E,D,H GINA,CHRIS Issued and reviewed written/illustrated HEP and spinal precautions handout. Educated on correct log rolling technique, correct t/f technique, and progression of POC. LR 08/15/18 0577                      User Key     Initials Effective Dates Name Provider Type Discipline    LR 06/19/15 -  Khushboo Aviles, PT Physical Therapist PT                    PT Recommendation and Plan  Anticipated Discharge Disposition (PT): home with assist, home with home health  Planned Therapy Interventions (PT Eval): balance training, bed mobility training, gait training, home exercise program, patient/family education, strengthening, transfer training  Therapy Frequency (PT Clinical Impression): daily  Outcome Summary/Treatment Plan (PT)  Daily Summary of Progress (PT): progress toward functional goals as expected  Anticipated Equipment Needs at Discharge (PT):  (TBD)  Anticipated Discharge Disposition (PT): home with assist, home with home health  Plan of Care Reviewed With: patient  Progress: improving  Outcome Summary: Patient ambulated 300 feet with RW, denied LE pain with ambulation. CGA for all mobility. Will continue to progress as able. Possible d/c home today.           Outcome Measures     Row Name 08/16/18 0842 08/15/18 1648          How much help from another person do you currently need...    Turning from your back to your side while in flat bed without using bedrails? 3  -LR 3  -LR     Moving from lying on back to sitting on the side of a flat bed without bedrails? 3  -LR 3  -LR     Moving to and from a bed to a chair (including a wheelchair)? 3  -LR 2  -LR     Standing up from a chair using your arms (e.g., wheelchair, bedside chair)? 3  -LR 3  -LR     Climbing 3-5 steps with a railing? 3  -LR 1  -LR     To walk in hospital room? 3  -LR 1  -LR     AM-PAC 6 Clicks Score 18  -LR 13  -LR        Functional Assessment    Outcome Measure Options AM-PAC 6  Clicks Basic Mobility (PT)  -LR AM-PAC 6 Clicks Basic Mobility (PT)  -LR       User Key  (r) = Recorded By, (t) = Taken By, (c) = Cosigned By    Initials Name Provider Type    Khushboo Barnard, PT Physical Therapist           Time Calculation:         PT Charges     Row Name 08/16/18 0842             Time Calculation    Start Time 0842  -LR      PT Received On 08/16/18  -LR      PT Goal Re-Cert Due Date 08/25/18  -LR         Time Calculation- PT    Total Timed Code Minutes- PT 16 minute(s)  -LR         Timed Charges    95105 - PT Therapeutic Exercise Minutes 6  -LR      28069 - Gait Training Minutes  10  -LR        User Key  (r) = Recorded By, (t) = Taken By, (c) = Cosigned By    Initials Name Provider Type    Khushboo Barnard, PT Physical Therapist        Therapy Suggested Charges     Code   Minutes Charges    93769 (CPT®) Hc Pt Neuromusc Re Education Ea 15 Min      59050 (CPT®) Hc Pt Ther Proc Ea 15 Min 6     36312 (CPT®) Hc Gait Training Ea 15 Min 10 1    15507 (CPT®) Hc Pt Therapeutic Act Ea 15 Min      98807 (CPT®) Hc Pt Manual Therapy Ea 15 Min      01276 (CPT®) Hc Pt Iontophoresis Ea 15 Min      72138 (CPT®) Hc Pt Elec Stim Ea-Per 15 Min      19477 (CPT®) Hc Pt Ultrasound Ea 15 Min      50496 (CPT®) Hc Pt Self Care/Mgmt/Train Ea 15 Min      37814 (CPT®) Hc Pt Prosthetic (S) Train Initial Encounter, Each 15 Min      56375 (CPT®) Hc Pt Orthotic(S)/Prosthetic(S) Encounter, Each 15 Min      44959 (CPT®) Hc Orthotic(S) Mgmt/Train Initial Encounter, Each 15min      Total  16 1        Therapy Charges for Today     Code Description Service Date Service Provider Modifiers Qty    78338006015 HC PT MOBILITY CURRENT 8/15/2018 Khushboo Aviles, PT GP, CK 1    56291413381 HC PT MOBILITY PROJECTED 8/15/2018 Khushboo Aviles, PT GP, CI 1    14743909731 HC PT THERAPEUTIC ACT EA 15 MIN 8/15/2018 Khushboo Aviles, PT GP 1    85864165312 HC PT THER SUPP EA 15 MIN 8/15/2018 Khushboo Aviles  Talia, PT GP 2    00902196737 HC PT EVAL MOD COMPLEXITY 3 8/15/2018 Khushboo Aviles, PT GP 1    66350084998 HC GAIT TRAINING EA 15 MIN 8/16/2018 Khushboo Aviles, PT GP 1          PT G-Codes  PT Professional Judgement Used?: Yes  Outcome Measure Options: AM-PAC 6 Clicks Basic Mobility (PT)  Score: 13  Functional Limitation: Mobility: Walking and moving around  Mobility: Walking and Moving Around Current Status (): At least 40 percent but less than 60 percent impaired, limited or restricted  Mobility: Walking and Moving Around Goal Status (): At least 1 percent but less than 20 percent impaired, limited or restricted    Khushboo Aviles, PT  8/16/2018

## 2018-08-16 NOTE — PLAN OF CARE
Problem: Patient Care Overview  Goal: Plan of Care Review  Outcome: Ongoing (interventions implemented as appropriate)   08/16/18 3291   Coping/Psychosocial   Plan of Care Reviewed With patient   Plan of Care Review   Progress improving   OTHER   Outcome Summary Pt VSS. Dressing CDI. Pt with no c/o pain.Ambulates well with assist of 1, gb, RW. Rested well.        Problem: Laminectomy/Foraminotomy/Discectomy (Adult)  Goal: Anesthesia/Sedation Recovery  Outcome: Ongoing (interventions implemented as appropriate)      Problem: Fall Risk (Adult)  Goal: Identify Related Risk Factors and Signs and Symptoms  Outcome: Ongoing (interventions implemented as appropriate)    Goal: Absence of Fall  Outcome: Ongoing (interventions implemented as appropriate)

## 2018-08-16 NOTE — DISCHARGE SUMMARY
Patient Name: Sarai Hardy  MRN: [id number]  : 1945  DOS: 2018    Attending: Micah Duarte MD    Primary Care Provider: Pam Reid MD    Date of Admission:.8/15/2018  7:41 AM    Date of Discharge:  2018    Discharge Diagnosis: Principal Problem:    S/P lumbar discectomy  Active Problems:    Back pain    HTN (hypertension)    Leukocytosis, likely reactive    Acute postoperative pain      Hospital Course  Patient is a 73 y.o. female presented for lumbar discectomy L2-3 by Dr. Duarte under GA. She tolerated surgery well and was admitted for further medical management.     She is known to us from previous lumbar fusion in 2017.      Patient was provided pain medications as needed for pain control.    Adjustments were made to pain medications to optimize postop pain management. Risks and benefits of opiate medications discussed with patient.    Sh was seen by PT and has progressed well over her stay.  She used an IS for atelectasis prophylaxis and mechanicals for DVT prophylaxis.  Home medications were resumed as appropriate, and labs were monitored and remained fairly stable.     With the progress she has made, she is ready for DC home today.    Discussed with patient regarding plan and she shows understanding and agreement.        Procedures Performed  PREOPERATIVE DIAGNOSIS: Radiculopathy secondary to L2-L3 disk herniation.     POSTOPERATIVE DIAGNOSIS: Radiculopathy secondary to L2-L3 disk herniation.     PROCEDURE PERFORMED: L2-L3 diskectomy.      SURGEON: Micah Duarte MD        Pertinent Test Results:    I reviewed the patient's new clinical results.     Results from last 7 days  Lab Units 18  0527 18  1245   WBC 10*3/mm3 16.45* 5.83   HEMOGLOBIN g/dL 11.8 12.6   HEMATOCRIT % 34.9 36.8   PLATELETS 10*3/mm3 251 241       Results from last 7 days  Lab Units 18  0527 18  1245   SODIUM mmol/L 135  --    POTASSIUM mmol/L 4.3 3.7   CHLORIDE mmol/L 105  --    CO2  "mmol/L 23.0  --    BUN mg/dL 16  --    CREATININE mg/dL 0.75  --    CALCIUM mg/dL 8.5*  --    GLUCOSE mg/dL 130*  --      I reviewed the patient's new imaging including images and reports.      Physical therapy: Patient ambulated 300 feet with RW, denied LE pain with ambulation. CGA for all mobility. Will continue to progress as able. Possible d/c home today.     Discharge Assessment:    Vital Signs  /66 (BP Location: Right arm, Patient Position: Lying)   Pulse 76   Temp 98.3 °F (36.8 °C) (Oral)   Resp 16   Ht 154.9 cm (61\")   Wt 67.1 kg (148 lb)   SpO2 95%   BMI 27.96 kg/m²   Temp (24hrs), Av.8 °F (36.6 °C), Min:96.7 °F (35.9 °C), Max:98.6 °F (37 °C)      General Appearance:    Alert, cooperative, in no acute distress   Lungs:     Clear to auscultation,respirations regular, even and                   unlabored    Heart:    Regular rhythm and normal rate, normal S1 and S2   Abdomen:     Normal bowel sounds, no masses, no organomegaly, soft        non-tender, non-distended, no guarding, no rebound                 tenderness   Extremities:   Moves all extremities well, no edema, no cyanosis, no              redness   Pulses:   Pulses palpable and equal bilaterally   Skin:   No bleeding, bruising or rash. Back dressing CDI   Neurologic:   Cranial nerves 2 - 12 grossly intact, sensation intact. .Flexion and dorsiflexion intact bilateral feet.       Discharge Disposition: Home    Discharge Medications     Discharge Medications      New Medications      Instructions Start Date   docusate sodium 100 MG capsule  Commonly known as:  COLACE   100 mg, Oral, 2 Times Daily         Continue These Medications      Instructions Start Date   amLODIPine 5 MG tablet  Commonly known as:  NORVASC   5 mg, Oral, Daily      CENTRUM ADULTS PO   1 tablet, Oral, Daily      estradiol 0.1 MG/24HR patch  Commonly known as:  CLIMARA   1 patch, Transdermal, Weekly      HYDROcodone-acetaminophen 7.5-325 MG per tablet  Commonly " known as:  NORCO   1 tablet, Oral, Every 6 Hours PRN             Discharge Diet: Regular diet     Activity at Discharge: Ambulate    Follow-up Appointments  Dr. Duarte per his orders      KARRIE Stephenson  08/16/18  11:24 AM

## 2018-08-16 NOTE — PLAN OF CARE
Problem: Patient Care Overview  Goal: Plan of Care Review  Outcome: Ongoing (interventions implemented as appropriate)   08/16/18 0842   Coping/Psychosocial   Plan of Care Reviewed With patient   Plan of Care Review   Progress improving   OTHER   Outcome Summary Patient ambulated 300 feet with RW, denied LE pain with ambulation. CGA for all mobility. Will continue to progress as able. Possible d/c home today.

## 2018-08-17 NOTE — THERAPY DISCHARGE NOTE
Acute Care - Physical Therapy Discharge Summary  [facility]       Patient Name: Sarai Hardy  : 1945  MRN: [id number]    Today's Date: 2018  Onset of Illness/Injury or Date of Surgery: 08/15/18    Date of Referral to PT: 08/15/18  Referring Physician: MD Gerald      Admit Date: 8/15/2018      PT Recommendation and Plan    Visit Dx:    ICD-10-CM ICD-9-CM   1. Impaired functional mobility, balance, gait, and endurance Z74.09 V49.89   2. S/P lumbar discectomy Z98.890 V45.89             Outcome Measures     Row Name 18 0842 08/15/18 1648          How much help from another person do you currently need...    Turning from your back to your side while in flat bed without using bedrails? 3  -LR 3  -LR     Moving from lying on back to sitting on the side of a flat bed without bedrails? 3  -LR 3  -LR     Moving to and from a bed to a chair (including a wheelchair)? 3  -LR 2  -LR     Standing up from a chair using your arms (e.g., wheelchair, bedside chair)? 3  -LR 3  -LR     Climbing 3-5 steps with a railing? 3  -LR 1  -LR     To walk in hospital room? 3  -LR 1  -LR     AM-PAC 6 Clicks Score 18  -LR 13  -LR        Functional Assessment    Outcome Measure Options AM-PAC 6 Clicks Basic Mobility (PT)  -LR AM-PAC 6 Clicks Basic Mobility (PT)  -LR       User Key  (r) = Recorded By, (t) = Taken By, (c) = Cosigned By    Initials Name Provider Type    LR Khushboo Aviles, PT Physical Therapist            Therapy Suggested Charges     Code   Minutes Charges    79082 (CPT®) Hc Pt Neuromusc Re Education Ea 15 Min      96097 (CPT®) Hc Pt Ther Proc Ea 15 Min 6     14213 (CPT®) Hc Gait Training Ea 15 Min 10 1    29888 (CPT®) Hc Pt Therapeutic Act Ea 15 Min      99866 (CPT®) Hc Pt Manual Therapy Ea 15 Min      11090 (CPT®) Hc Pt Iontophoresis Ea 15 Min      17239 (CPT®) Hc Pt Elec Stim Ea-Per 15 Min      21748 (CPT®) Hc Pt Ultrasound Ea 15 Min      43069 (CPT®) Hc Pt Self Care/Mgmt/Train Ea 15 Min      51010  (CPT®) Hc Pt Prosthetic (S) Train Initial Encounter, Each 15 Min      07612 (CPT®) Hc Pt Orthotic(S)/Prosthetic(S) Encounter, Each 15 Min      85349 (CPT®) Hc Orthotic(S) Mgmt/Train Initial Encounter, Each 15min      Total  16 1                PT Rehab Goals     Row Name 08/17/18 1312             Bed Mobility Goal 1 (PT)    Activity/Assistive Device (Bed Mobility Goal 1, PT) sit to supine/supine to sit  -MC      [place] Level/Cues Needed (Bed Mobility Goal 1, PT) conditional independence  -MC      Time Frame (Bed Mobility Goal 1, PT) long term goal (LTG);5 days  -MC      Progress/Outcomes (Bed Mobility Goal 1, PT) goal ongoing  -MC         Transfer Goal 1 (PT)    Activity/Assistive Device (Transfer Goal 1, PT) sit-to-stand/stand-to-sit;walker, rolling  -MC      [place] Level/Cues Needed (Transfer Goal 1, PT) conditional independence  -MC      Time Frame (Transfer Goal 1, PT) long term goal (LTG);5 days  -MC      Progress/Outcome (Transfer Goal 1, PT) goal ongoing  -MC         Gait Training Goal 1 (PT)    Activity/Assistive Device (Gait Training Goal 1, PT) gait (walking locomotion);walker, rolling  -MC      [place] Level (Gait Training Goal 1, PT) conditional independence  -MC      Distance (Gait Goal 1, PT) 500 feet  -MC      Time Frame (Gait Training Goal 1, PT) long term goal (LTG);5 days  -MC      Progress/Outcome (Gait Training Goal 1, PT) goal ongoing  -MC        User Key  (r) = Recorded By, (t) = Taken By, (c) = Cosigned By    Initials Name Provider Type Discipline    Gladys Gallegos, PT Physical Therapist PT              PT Discharge Summary  Anticipated Discharge Disposition (PT): home with assist, home with home health  Reason for Discharge: Discharge from facility  Outcomes Achieved: Refer to plan of care for updates on goals achieved      Gladys Templeton, PT   8/17/2018

## 2019-03-19 ENCOUNTER — TRANSCRIBE ORDERS (OUTPATIENT)
Dept: ADMINISTRATIVE | Facility: HOSPITAL | Age: 74
End: 2019-03-19

## 2019-03-19 DIAGNOSIS — Z12.31 VISIT FOR SCREENING MAMMOGRAM: Primary | ICD-10-CM

## 2019-04-26 ENCOUNTER — HOSPITAL ENCOUNTER (OUTPATIENT)
Dept: MAMMOGRAPHY | Facility: HOSPITAL | Age: 74
Discharge: HOME | End: 2019-04-26
Admitting: FAMILY MEDICINE

## 2019-04-26 DIAGNOSIS — Z12.31 VISIT FOR SCREENING MAMMOGRAM: ICD-10-CM

## 2019-04-26 PROCEDURE — 77067 SCR MAMMO BI INCL CAD: CPT | Performed by: RADIOLOGY

## 2019-04-26 PROCEDURE — 77067 SCR MAMMO BI INCL CAD: CPT

## 2019-04-26 PROCEDURE — 77063 BREAST TOMOSYNTHESIS BI: CPT | Performed by: RADIOLOGY

## 2019-04-26 PROCEDURE — 77063 BREAST TOMOSYNTHESIS BI: CPT

## 2019-05-10 ENCOUNTER — OFFICE VISIT (OUTPATIENT)
Dept: ORTHOPEDIC SURGERY | Facility: CLINIC | Age: 74
End: 2019-05-10

## 2019-05-10 VITALS — OXYGEN SATURATION: 97 % | HEIGHT: 61 IN | BODY MASS INDEX: 28.68 KG/M2 | HEART RATE: 93 BPM | WEIGHT: 151.9 LBS

## 2019-05-10 DIAGNOSIS — M25.561 RIGHT KNEE PAIN, UNSPECIFIED CHRONICITY: ICD-10-CM

## 2019-05-10 DIAGNOSIS — M17.11 PRIMARY OSTEOARTHRITIS OF RIGHT KNEE: Primary | ICD-10-CM

## 2019-05-10 PROCEDURE — 99204 OFFICE O/P NEW MOD 45 MIN: CPT | Performed by: ORTHOPAEDIC SURGERY

## 2019-05-10 PROCEDURE — 20610 DRAIN/INJ JOINT/BURSA W/O US: CPT | Performed by: ORTHOPAEDIC SURGERY

## 2019-05-10 RX ORDER — LIDOCAINE HYDROCHLORIDE 10 MG/ML
3 INJECTION, SOLUTION EPIDURAL; INFILTRATION; INTRACAUDAL; PERINEURAL
Status: COMPLETED | OUTPATIENT
Start: 2019-05-10 | End: 2019-05-10

## 2019-05-10 RX ORDER — BUPIVACAINE HYDROCHLORIDE 2.5 MG/ML
3 INJECTION, SOLUTION INFILTRATION; PERINEURAL
Status: COMPLETED | OUTPATIENT
Start: 2019-05-10 | End: 2019-05-10

## 2019-05-10 RX ORDER — TRIAMCINOLONE ACETONIDE 40 MG/ML
40 INJECTION, SUSPENSION INTRA-ARTICULAR; INTRAMUSCULAR
Status: COMPLETED | OUTPATIENT
Start: 2019-05-10 | End: 2019-05-10

## 2019-05-10 RX ADMIN — BUPIVACAINE HYDROCHLORIDE 3 ML: 2.5 INJECTION, SOLUTION INFILTRATION; PERINEURAL at 10:52

## 2019-05-10 RX ADMIN — LIDOCAINE HYDROCHLORIDE 3 ML: 10 INJECTION, SOLUTION EPIDURAL; INFILTRATION; INTRACAUDAL; PERINEURAL at 10:52

## 2019-05-10 RX ADMIN — TRIAMCINOLONE ACETONIDE 40 MG: 40 INJECTION, SUSPENSION INTRA-ARTICULAR; INTRAMUSCULAR at 10:52

## 2019-05-10 NOTE — PROGRESS NOTES
Orthopaedic Clinic Note: Knee New Patient    Chief Complaint   Patient presents with   • Right Knee - Pain        HPI    Sarai Hardy is a 74 y.o. female who presents with right knee pain for 6 month(s). Onset has been atraumatic and gradual in nature.  Pain is localized primarily to the medial joint line as well as posteriorly in the popliteal fossa.  She rates pain 3/10 on the pain scale.  She is complaining of an aching throbbing and stabbing sensation throughout the knee but primarily along the medial joint line.  She is also noticing swelling and stiffness of the knee.  Is worse with weightbearing activity such as walking and standing.  Sitting and resting eases her pain.  No previous interventions have been tried to date.  She is status post lumbar spine surgery by Dr. Duarte.    Past Medical History:   Diagnosis Date   • Arthritis    • Back pain    • Hypertension    • Wears glasses       Past Surgical History:   Procedure Laterality Date   • BLADDER SURGERY     • COLONOSCOPY     • HYSTERECTOMY  1992   • LUMBAR DISCECTOMY N/A 8/15/2018    Procedure: LUMBAR DISCECTOMY L2-3;  Surgeon: Micah Duarte MD;  Location:  [facility];  Service: Orthopedic Spine   • LUMBAR DISCECTOMY FUSION INSTRUMENTATION N/A 11/15/2017    Procedure: LUMBAR DECOMPRESSION AND FUSION WITH INSTRUMENTATION;  Surgeon: Micah Duarte MD;  Location:  [facility];  Service:    • OOPHORECTOMY Bilateral 1992      Family History   Problem Relation Age of Onset   • Breast cancer Neg Hx    • Ovarian cancer Neg Hx      Social History     Socioeconomic History   • Marital status:      Spouse name: Not on file   • Number of children: Not on file   • Years of education: Not on file   • Highest education level: Not on file   Tobacco Use   • Smoking status: Former Smoker     Types: Cigarettes   • Smokeless tobacco: Never Used   • Tobacco comment: 20 years   Substance and Sexual Activity   • Alcohol use: No   • Drug use: No   • Sexual activity: Defer  "     Current Outpatient Medications on File Prior to Visit   Medication Sig Dispense Refill   • amLODIPine (NORVASC) 5 MG tablet Take 5 mg by mouth Daily.     • estradiol (CLIMARA) 0.1 MG/24HR patch Place 1 patch on the skin 1 (One) Time Per Week.     • Multiple Vitamins-Minerals (CENTRUM ADULTS PO) Take 1 tablet by mouth Daily.     • [DISCONTINUED] docusate sodium (COLACE) 100 MG capsule Take 1 capsule by mouth 2 (Two) Times a Day. 60 capsule 0   • [DISCONTINUED] HYDROcodone-acetaminophen (NORCO) 7.5-325 MG per tablet Take 1 tablet by mouth Every 6 (Six) Hours As Needed for Moderate Pain .       No current facility-administered medications on file prior to visit.       Allergies   Allergen Reactions   • Codeine Other (See Comments)     Per patient makes goofy   • Morphine And Related Other (See Comments)     Makes goofy and laughs        Review of Systems   Constitutional: Negative.    HENT: Positive for sneezing.    Eyes: Positive for itching.   Respiratory: Negative.    Cardiovascular: Negative.    Gastrointestinal: Negative.    Endocrine: Negative.    Genitourinary: Negative.    Musculoskeletal: Positive for back pain and joint swelling.   Skin: Negative.    Allergic/Immunologic: Negative.    Neurological: Positive for speech difficulty.   Hematological: Negative.    Psychiatric/Behavioral: Negative.         The patient's Review of Systems was personally reviewed and confirmed as accurate.    The following portions of the patient's history were reviewed and updated as appropriate: allergies, current medications, past family history, past medical history, past social history, past surgical history and problem list.    Physical Exam  Pulse 93, height 154.9 cm (60.98\"), weight 68.9 kg (151 lb 14.4 oz), SpO2 97 %, not currently breastfeeding.    Body mass index is 28.72 kg/m².    GENERAL APPEARANCE: awake, alert & oriented x 3, in no acute distress and well developed, well nourished  PSYCH: normal affect  LUNGS:  " breathing nonlabored  EYES: sclera anicteric  CARDIOVASCULAR: palpable dorsalis pedis, palpable posterior tibial bilaterally. Capillary refill less than 2 seconds  EXTREMITIES: no clubbing, cyanosis  GAIT:  Antalgic            Right Lower Extremity Exam:   ----------  Hip Exam  ----------  FLEXION CONTRACTURE: None  FLEXION: 110 degrees  INTERNAL ROTATION: 20 degrees at 90 degrees of flexion   EXTERNAL ROTATION: 40 degrees at 90 degrees of flexion    PAIN WITH HIP MOTION: no  ----------  Knee Exam  ----------  ALIGNMENT: 3 degrees varus, correctible to neutral    RANGE OF MOTION:  Decreased (5 - 115 degrees) with no extensor lag  LIGAMENTOUS STABILITY:   stable to varus and valgus stress at terminal extension and 30 degrees; slight retensioning of the MCL is appreciated with valgus stress at 30 degrees consistent with medial compartment degeneration     STRENGTH:  5/5 knee flexion, extension. 5/5 ankle dorsiflexion and plantarflexion.     PAIN WITH PALPATION: global  KNEE EFFUSION: yes, trace effusion  PAIN WITH KNEE ROM: yes, global but primarily along medial joint line  PATELLAR CREPITUS: yes, painful and symptomatic  SPECIAL EXAM FINDINGS:  painful patellar compression    REFLEXES:  PATELLAR 2+/4  ACHILLES 2+/4    CLONUS: no  STRAIGHT LEG TEST:   negative    SENSATION TO LIGHT TOUCH:  DEEP PERONEAL/SUPERFICIAL PERONEAL/SURAL/SAPHENOUS/TIBIAL:   intact    EDEMA:  no  ERYTHEMA:  no  WOUNDS/INCISIONS: no        Left Lower Extremity Exam:   ----------  Hip Exam  ----------  FLEXION CONTRACTURE: None  FLEXION: 110 degrees  INTERNAL ROTATION: 20 degrees at 90 degrees of flexion   EXTERNAL ROTATION: 40 degrees at 90 degrees of flexion    PAIN WITH HIP MOTION: no  ----------  Knee Exam  ----------  ALIGNMENT: Slight varus, correctable to neutral      RANGE OF MOTION:  Normal (0-120 degrees) with no extensor lag or flexion contracture  LIGAMENTOUS STABILITY:   stable to varus and valgus stress at 0 and 30 degrees without  any evidence of laxity     STRENGTH:  5/5 knee flexion, extension. 5/5 ankle dorsiflexion and plantarflexion.     PAIN WITH PALPATION: denies tenderness to palpation about the knee, denies medial or lateral joint line pain  KNEE EFFUSION: no  PAIN WITH KNEE ROM: no  PATELLAR CREPITUS: no  SPECIAL EXAM FINDINGS:  Negative patellar compression    REFLEXES:  PATELLAR 2+/4  ACHILLES 2+/4    CLONUS: negative  STRAIGHT LEG TEST:   negative    SENSATION TO LIGHT TOUCH:  DEEP PERONEAL/SUPERFICIAL PERONEAL/SURAL/SAPHENOUS/TIBIAL:   intact    EDEMA:  no  ERYTHEMA:  no  WOUNDS/INCISIONS: none, no overlying skin problems.    ______________________________________________________________________  ______________________________________________________________________    RADIOGRAPHIC FINDINGS:   Indication: Right knee pain    Comparison: No prior xrays are available for comparison    Right knee(s) 4 views: moderate to severe tricompartmental arthritis with genu varum alignment, periarticular osteophytes visualized in all compartments      Assessment/Plan:   Diagnosis Plan   1. Primary osteoarthritis of right knee     2. Right knee pain, unspecified chronicity  XR Knee 4+ View Right     Patient symptoms are consistent with severe arthritis of the right knee.  No prior conservative interventions have been tried to date.  I discussed conservative versus surgical treatment options.  Patient is agreeable to proceeding with conservative intervention.  She is interested in cortisone injection today.  This will be performed and she will follow-up in 3 months.    Procedure Note:  I discussed with the patient the potential benefits of performing a therapeutic injection of the right knee as well as potential risks including but not limited to infection, swelling, pain, bleeding, bruising, nerve/vessel damage, skin color changes, transient elevation in blood glucose levels, and fat atrophy. After informed consent and after the area was  prepped with alcohol, ethyl chloride was used to numb the skin. Via the superior lateral approach, 3cc of 1% lidocaine, 3cc of 0.25% marcaine and 2 cc of 40mg/ml of Kenalog were injected into the right knee. The patient tolerated the procedure well. There were no complications. A sterile dressing was placed over the injection site.      Rakan Brown MD  05/10/19  10:53 AM

## 2019-05-10 NOTE — PROGRESS NOTES
Procedure   Large Joint Arthrocentesis: R knee  Date/Time: 5/10/2019 10:52 AM  Consent given by: patient  Site marked: site marked  Timeout: Immediately prior to procedure a time out was called to verify the correct patient, procedure, equipment, support staff and site/side marked as required   Supporting Documentation  Indications: pain   Procedure Details  Location: knee - R knee  Preparation: Patient was prepped and draped in the usual sterile fashion  Needle size: 22 G  Approach: anterolateral  Medications administered: 3 mL bupivacaine 0.25 %; 3 mL lidocaine PF 1% 1 %; 40 mg triamcinolone acetonide 40 MG/ML  Patient tolerance: patient tolerated the procedure well with no immediate complications

## 2019-08-09 ENCOUNTER — OFFICE VISIT (OUTPATIENT)
Dept: ORTHOPEDIC SURGERY | Facility: CLINIC | Age: 74
End: 2019-08-09

## 2019-08-09 VITALS — HEIGHT: 61 IN | WEIGHT: 145 LBS | HEART RATE: 83 BPM | OXYGEN SATURATION: 97 % | BODY MASS INDEX: 27.38 KG/M2

## 2019-08-09 DIAGNOSIS — M17.11 PRIMARY OSTEOARTHRITIS OF RIGHT KNEE: Primary | ICD-10-CM

## 2019-08-09 PROCEDURE — 99213 OFFICE O/P EST LOW 20 MIN: CPT | Performed by: ORTHOPAEDIC SURGERY

## 2019-08-09 RX ORDER — MELOXICAM 7.5 MG/1
15 TABLET ORAL ONCE
Status: CANCELLED | OUTPATIENT
Start: 2019-08-09 | End: 2019-08-09

## 2019-08-09 RX ORDER — OXYCODONE HCL 10 MG/1
10 TABLET, FILM COATED, EXTENDED RELEASE ORAL ONCE
Status: CANCELLED | OUTPATIENT
Start: 2019-08-09 | End: 2019-08-09

## 2019-08-09 RX ORDER — ACETAMINOPHEN 325 MG/1
1000 TABLET ORAL ONCE
Status: CANCELLED | OUTPATIENT
Start: 2019-08-09 | End: 2019-08-09

## 2019-08-09 NOTE — PROGRESS NOTES
Orthopaedic Clinic Note: Knee Established Patient    Chief Complaint   Patient presents with   • Follow-up     3 month recheck - Primary osteoarthritis of right knee        HPI    It has been 3  month(s) since Ms. Hardy's last visit. She returns to clinic today for follow-up right knee osteoarthritis.  She received a cortisone injection at her last visit.  The injection only provided 2 weeks of relief before pain returned.  In addition to the cortisone, she has also had treatments including anti-inflammatories as well as a home exercise program for strengthening and physical therapy.  Despite these interventions, her pain is still persisting.  She is rating her pain 7/10 on the pain scale.  She is having significant difficulty ambulating as a result of pain and aching in the knee.  She has episodes of her knee buckling as a result of the pain.  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  She is here to discuss further treatment options.    Past Medical History:   Diagnosis Date   • Arthritis    • Back pain    • Hypertension    • Wears glasses       Past Surgical History:   Procedure Laterality Date   • BLADDER SURGERY     • COLONOSCOPY     • HYSTERECTOMY  1992   • LUMBAR DISCECTOMY N/A 8/15/2018    Procedure: LUMBAR DISCECTOMY L2-3;  Surgeon: Micah Duarte MD;  Location:  [facility];  Service: Orthopedic Spine   • LUMBAR DISCECTOMY FUSION INSTRUMENTATION N/A 11/15/2017    Procedure: LUMBAR DECOMPRESSION AND FUSION WITH INSTRUMENTATION;  Surgeon: Micah Duarte MD;  Location:  [facility];  Service:    • OOPHORECTOMY Bilateral 1992      Family History   Problem Relation Age of Onset   • Breast cancer Neg Hx    • Ovarian cancer Neg Hx      Social History     Socioeconomic History   • Marital status:      Spouse name: Not on file   • Number of children: Not on file   • Years of education: Not on file   • Highest education level: Not  "on file   Tobacco Use   • Smoking status: Former Smoker     Types: Cigarettes   • Smokeless tobacco: Never Used   • Tobacco comment: 20 years   Substance and Sexual Activity   • Alcohol use: No   • Drug use: No   • Sexual activity: Defer      Current Outpatient Medications on File Prior to Visit   Medication Sig Dispense Refill   • amLODIPine (NORVASC) 5 MG tablet Take 5 mg by mouth Daily.     • estradiol (CLIMARA) 0.1 MG/24HR patch Place 1 patch on the skin 1 (One) Time Per Week.     • Multiple Vitamins-Minerals (CENTRUM ADULTS PO) Take 1 tablet by mouth Daily.       No current facility-administered medications on file prior to visit.       Allergies   Allergen Reactions   • Codeine Other (See Comments)     Per patient makes goofy   • Morphine And Related Other (See Comments)     Makes cristinofhandy and laughs        Review of Systems   Constitutional: Negative.    HENT: Negative.    Eyes: Negative.    Respiratory: Negative.    Cardiovascular: Negative.    Gastrointestinal: Negative.    Endocrine: Negative.    Genitourinary: Negative.    Musculoskeletal: Positive for arthralgias.   Skin: Negative.    Allergic/Immunologic: Negative.    Neurological: Negative.    Hematological: Negative.    Psychiatric/Behavioral: Negative.         The patient's Review of Systems was personally reviewed and confirmed as accurate.    Physical Exam  Pulse 83, height 154.9 cm (60.98\"), weight 65.8 kg (145 lb), SpO2 97 %, not currently breastfeeding.    Body mass index is 27.41 kg/m².    GENERAL APPEARANCE: awake, alert, oriented, in no acute distress and well developed, well nourished  LUNGS:  breathing nonlabored  EXTREMITIES: no clubbing, cyanosis  PERIPHERAL PULSES: palpable dorsalis pedis and posterior tibial pulses bilaterally.    GAIT:  Antalgic        ----------  Right Knee Exam:  ----------  ALIGNMENT: 5 degrees varus, correctible to neutral  ----------  RANGE OF MOTION:  Decreased (5 - 115 degrees) with no extensor lag  LIGAMENTOUS " STABILITY:   stable to varus and valgus stress at terminal extension and 30 degrees; slight retensioning of the MCL is appreciated with valgus stress at 30 degrees consistent with medial compartment degeneration  ----------  STRENGTH:  KNEE FLEXION 5/5  KNEE EXTENSION  5/5  ANKLE DORSIFLEXION  5/5  ANKLE PLANTARFLEXION  5/5  ----------  PAIN WITH PALPATION:medial joint line, anterior knee and popliteal region  KNEE EFFUSION: yes, trace effusion  PAIN WITH KNEE ROM: yes  PATELLAR CREPITUS:  yes, painful and symptomatic  ----------  SENSATION TO LIGHT TOUCH:  DEEP PERONEAL/SUPERFICIAL PERONEAL/SURAL/SAPHENOUS/TIBIAL:    intact  ----------  EDEMA:  no  ERYTHEMA:    no  WOUNDS/INCISIONS:  no  _____________________________________________________________________  _____________________________________________________________________    RADIOGRAPHIC FINDINGS:   No new imaging today.  Prior imaging demonstrated severe varus arthritis with bone-on-bone articulation medial compartment    Assessment/Plan:   Diagnosis Plan   1. Primary osteoarthritis of right knee  Case Request    Pregnancy, Urine - Urine, Clean Catch    CBC and Differential    Basic metabolic panel    Protime-INR    APTT    Hemoglobin A1c    Urinalysis With Culture If Indicated -    ECG 12 Lead    Nicotine & Metabolite, Quant    Tranexamic Acid 1,000 mg in sodium chloride 0.9 % 100 mL    Tranexamic Acid 1,000 mg in sodium chloride 0.9 % 100 mL    ceFAZolin (ANCEF) 2 g in sodium chloride 0.9 % 100 mL IVPB    acetaminophen (TYLENOL) tablet 975 mg    meloxicam (MOBIC) tablet 15 mg    mupirocin (BACTROBAN) 2 % nasal ointment 1 application    oxyCODONE (oxyCONTIN) 12 hr tablet 10 mg    Case Request     Patient has failed conservative treatment options.  I discussed surgical intervention at this time.  Given her pain being refractory to conservative treatment she is agreeable to proceeding to surgery.  I discussed total knee arthroplasty versus unicompartmental knee  arthroplasty as well as the risk and benefits of both.  Patient elected to proceed with total knee arthroplasty.    The patient has clinical and radiographic evidence of severe right knee joint degeneration. Conservative measures have been tried for 3 months or longer, but have failed to adequately treat or improve the patient's symptoms. Pain is restricting the patient's daily activities as well as quality of life. The recommendation at this time is to proceed with a right total knee arthroplasty with the goal to improve patient function and pain. The risks, benefits, potential complications, and alternatives were discussed with the patient in detail. Risks included but were not limited to bleeding, infection, anesthesia risks, damage to neurovascular structures, osteolysis, aseptic loosening, instability, dislocation, pain, continued pain, iatrogenic fracture, possible need for future surgery including the potential for amputation, blood clots, myocardial infarction, stroke, and death. Makayla-operative blood management and the potential for blood transfusion were discussed with risks and options clearly outlined. Specific details of the surgical procedure, hospitalization, recovery, rehabilitation, and long-term precautions were also presented. Pre-operative teaching was provided. Implant/prosthesis selection was outlined, and the many options available were explained; the final choice will be made at the time of the procedure to match the anatomy and condition of the bone, ligaments, tendons, and muscles. Given this instruction, the patient elected to proceed with the right total knee arthroplasty. The patient will be seen by pre-admission testing for pre-operative optimization and risk assessment and will be scheduled for surgery once this is completed.    The patient is considered standard risk for DVT based on patient risk factors and will be placed on aspirin postoperatively for DVT prophylaxis.        Rakan  JENNA Brown MD  08/09/19  10:32 AM

## 2019-12-19 ENCOUNTER — HOSPITAL ENCOUNTER (OUTPATIENT)
Dept: GENERAL RADIOLOGY | Facility: HOSPITAL | Age: 74
Discharge: HOME | End: 2019-12-19
Admitting: ORTHOPAEDIC SURGERY

## 2019-12-19 ENCOUNTER — APPOINTMENT (OUTPATIENT)
Dept: PREADMISSION TESTING | Facility: HOSPITAL | Age: 74
End: 2019-12-19

## 2019-12-19 VITALS — HEIGHT: 61 IN | BODY MASS INDEX: 25.49 KG/M2 | WEIGHT: 135 LBS

## 2019-12-19 LAB
25(OH)D3 SERPL-MCNC: 47.9 NG/ML (ref 30–100)
ALBUMIN SERPL-MCNC: 4.7 G/DL (ref 3.5–5.2)
ALBUMIN/GLOB SERPL: 1.9 G/DL
ALP SERPL-CCNC: 43 U/L (ref 39–117)
ALT SERPL W P-5'-P-CCNC: 13 U/L (ref 1–33)
ANION GAP SERPL CALCULATED.3IONS-SCNC: 11 MMOL/L (ref 5–15)
APTT PPP: 33.2 SECONDS (ref 24–37)
AST SERPL-CCNC: 22 U/L (ref 1–32)
BASOPHILS # BLD AUTO: 0.05 10*3/MM3 (ref 0–0.2)
BASOPHILS NFR BLD AUTO: 0.9 % (ref 0–1.5)
BILIRUB SERPL-MCNC: 0.4 MG/DL (ref 0.2–1.2)
BUN BLD-MCNC: 17 MG/DL (ref 8–23)
BUN/CREAT SERPL: 24.3 (ref 7–25)
CALCIUM SPEC-SCNC: 9.3 MG/DL (ref 8.6–10.5)
CHLORIDE SERPL-SCNC: 101 MMOL/L (ref 98–107)
CO2 SERPL-SCNC: 25 MMOL/L (ref 22–29)
CREAT BLD-MCNC: 0.7 MG/DL (ref 0.57–1)
DEPRECATED RDW RBC AUTO: 40 FL (ref 37–54)
EOSINOPHIL # BLD AUTO: 0.24 10*3/MM3 (ref 0–0.4)
EOSINOPHIL NFR BLD AUTO: 4.2 % (ref 0.3–6.2)
ERYTHROCYTE [DISTWIDTH] IN BLOOD BY AUTOMATED COUNT: 13.1 % (ref 12.3–15.4)
GFR SERPL CREATININE-BSD FRML MDRD: 82 ML/MIN/1.73
GLOBULIN UR ELPH-MCNC: 2.5 GM/DL
GLUCOSE BLD-MCNC: 166 MG/DL (ref 65–99)
HBA1C MFR BLD: 5.5 % (ref 4.8–5.6)
HCT VFR BLD AUTO: 38.6 % (ref 34–46.6)
HGB BLD-MCNC: 13.2 G/DL (ref 12–15.9)
IMM GRANULOCYTES # BLD AUTO: 0.04 10*3/MM3 (ref 0–0.05)
IMM GRANULOCYTES NFR BLD AUTO: 0.7 % (ref 0–0.5)
INR PPP: 0.96 (ref 0.85–1.16)
LYMPHOCYTES # BLD AUTO: 1.44 10*3/MM3 (ref 0.7–3.1)
LYMPHOCYTES NFR BLD AUTO: 25.1 % (ref 19.6–45.3)
MCH RBC QN AUTO: 28.9 PG (ref 26.6–33)
MCHC RBC AUTO-ENTMCNC: 34.2 G/DL (ref 31.5–35.7)
MCV RBC AUTO: 84.6 FL (ref 79–97)
MONOCYTES # BLD AUTO: 0.49 10*3/MM3 (ref 0.1–0.9)
MONOCYTES NFR BLD AUTO: 8.6 % (ref 5–12)
NEUTROPHILS # BLD AUTO: 3.47 10*3/MM3 (ref 1.7–7)
NEUTROPHILS NFR BLD AUTO: 60.5 % (ref 42.7–76)
NRBC BLD AUTO-RTO: 0 /100 WBC (ref 0–0.2)
PLATELET # BLD AUTO: 273 10*3/MM3 (ref 140–450)
PMV BLD AUTO: 9.6 FL (ref 6–12)
POTASSIUM BLD-SCNC: 3.9 MMOL/L (ref 3.5–5.2)
PROT SERPL-MCNC: 7.2 G/DL (ref 6–8.5)
PROTHROMBIN TIME: 12.3 SECONDS (ref 11.2–14.3)
RBC # BLD AUTO: 4.56 10*6/MM3 (ref 3.77–5.28)
SODIUM BLD-SCNC: 137 MMOL/L (ref 136–145)
WBC NRBC COR # BLD: 5.73 10*3/MM3 (ref 3.4–10.8)

## 2019-12-19 PROCEDURE — 71046 X-RAY EXAM CHEST 2 VIEWS: CPT

## 2019-12-19 PROCEDURE — 80053 COMPREHEN METABOLIC PANEL: CPT | Performed by: ORTHOPAEDIC SURGERY

## 2019-12-19 PROCEDURE — 85610 PROTHROMBIN TIME: CPT | Performed by: ORTHOPAEDIC SURGERY

## 2019-12-19 PROCEDURE — 82306 VITAMIN D 25 HYDROXY: CPT | Performed by: ORTHOPAEDIC SURGERY

## 2019-12-19 PROCEDURE — 36415 COLL VENOUS BLD VENIPUNCTURE: CPT

## 2019-12-19 PROCEDURE — 87081 CULTURE SCREEN ONLY: CPT | Performed by: ORTHOPAEDIC SURGERY

## 2019-12-19 PROCEDURE — 93005 ELECTROCARDIOGRAM TRACING: CPT

## 2019-12-19 PROCEDURE — 93010 ELECTROCARDIOGRAM REPORT: CPT | Performed by: INTERNAL MEDICINE

## 2019-12-19 PROCEDURE — 85025 COMPLETE CBC W/AUTO DIFF WBC: CPT | Performed by: ORTHOPAEDIC SURGERY

## 2019-12-19 PROCEDURE — G0480 DRUG TEST DEF 1-7 CLASSES: HCPCS | Performed by: ORTHOPAEDIC SURGERY

## 2019-12-19 PROCEDURE — 83036 HEMOGLOBIN GLYCOSYLATED A1C: CPT | Performed by: ORTHOPAEDIC SURGERY

## 2019-12-19 PROCEDURE — 85730 THROMBOPLASTIN TIME PARTIAL: CPT | Performed by: ORTHOPAEDIC SURGERY

## 2019-12-19 ASSESSMENT — KOOS JR
KOOS JR SCORE: 17
KOOS JR SCORE: 44.905

## 2019-12-20 LAB — MRSA SPEC QL CULT: NORMAL

## 2019-12-26 LAB
COTININE UR-MCNC: NORMAL NG/ML
NICOTINE SERPL-MCNC: NORMAL NG/ML

## 2020-01-02 ENCOUNTER — ANESTHESIA EVENT (OUTPATIENT)
Dept: PERIOP | Facility: HOSPITAL | Age: 75
End: 2020-01-02

## 2020-01-02 ENCOUNTER — ANESTHESIA (OUTPATIENT)
Dept: PERIOP | Facility: HOSPITAL | Age: 75
End: 2020-01-02

## 2020-01-02 ENCOUNTER — HOSPITAL ENCOUNTER (OUTPATIENT)
Facility: HOSPITAL | Age: 75
Discharge: HOME | End: 2020-01-03
Attending: ORTHOPAEDIC SURGERY | Admitting: ORTHOPAEDIC SURGERY

## 2020-01-02 ENCOUNTER — APPOINTMENT (OUTPATIENT)
Dept: GENERAL RADIOLOGY | Facility: HOSPITAL | Age: 75
End: 2020-01-02

## 2020-01-02 DIAGNOSIS — Z74.09 IMPAIRED MOBILITY AND ADLS: Primary | ICD-10-CM

## 2020-01-02 DIAGNOSIS — Z96.651 S/P RIGHT UNICOMPARTMENTAL KNEE REPLACEMENT: ICD-10-CM

## 2020-01-02 DIAGNOSIS — Z78.9 IMPAIRED MOBILITY AND ADLS: Primary | ICD-10-CM

## 2020-01-02 PROBLEM — R33.8 ACUTE URINARY RETENTION: Status: ACTIVE | Noted: 2020-01-02

## 2020-01-02 PROBLEM — M25.569 KNEE PAIN: Status: ACTIVE | Noted: 2020-01-02

## 2020-01-02 LAB
ANION GAP SERPL CALCULATED.3IONS-SCNC: 9 MMOL/L (ref 5–15)
BUN BLD-MCNC: 13 MG/DL (ref 8–23)
BUN/CREAT SERPL: 16.9 (ref 7–25)
CALCIUM SPEC-SCNC: 9.3 MG/DL (ref 8.6–10.5)
CHLORIDE SERPL-SCNC: 104 MMOL/L (ref 98–107)
CO2 SERPL-SCNC: 25 MMOL/L (ref 22–29)
CREAT BLD-MCNC: 0.77 MG/DL (ref 0.57–1)
GFR SERPL CREATININE-BSD FRML MDRD: 73 ML/MIN/1.73
GLUCOSE BLD-MCNC: 135 MG/DL (ref 65–99)
HCT VFR BLD AUTO: 39.9 % (ref 34–46.6)
HGB BLD-MCNC: 13.3 G/DL (ref 12–15.9)
POTASSIUM BLD-SCNC: 4 MMOL/L (ref 3.5–5.2)
SODIUM BLD-SCNC: 138 MMOL/L (ref 136–145)

## 2020-01-02 PROCEDURE — 25010000003 CEFAZOLIN IN DEXTROSE 2-4 GM/100ML-% SOLUTION: Performed by: ORTHOPAEDIC SURGERY

## 2020-01-02 PROCEDURE — 73560 X-RAY EXAM OF KNEE 1 OR 2: CPT

## 2020-01-02 PROCEDURE — 25010000002 ROPIVACAINE PER 1 MG: Performed by: NURSE ANESTHETIST, CERTIFIED REGISTERED

## 2020-01-02 PROCEDURE — 25010000002 ONDANSETRON PER 1 MG: Performed by: NURSE ANESTHETIST, CERTIFIED REGISTERED

## 2020-01-02 PROCEDURE — 63710000001 AMLODIPINE 5 MG TABLET: Performed by: NURSE PRACTITIONER

## 2020-01-02 PROCEDURE — 25010000002 PROPOFOL 10 MG/ML EMULSION: Performed by: NURSE ANESTHETIST, CERTIFIED REGISTERED

## 2020-01-02 PROCEDURE — C1776 JOINT DEVICE (IMPLANTABLE): HCPCS | Performed by: ORTHOPAEDIC SURGERY

## 2020-01-02 PROCEDURE — 63710000001 ACETAMINOPHEN 500 MG TABLET: Performed by: ORTHOPAEDIC SURGERY

## 2020-01-02 PROCEDURE — 97116 GAIT TRAINING THERAPY: CPT

## 2020-01-02 PROCEDURE — 97161 PT EVAL LOW COMPLEX 20 MIN: CPT

## 2020-01-02 PROCEDURE — 94799 UNLISTED PULMONARY SVC/PX: CPT

## 2020-01-02 PROCEDURE — 25010000002 DEXAMETHASONE PER 1 MG: Performed by: NURSE ANESTHETIST, CERTIFIED REGISTERED

## 2020-01-02 PROCEDURE — 25010000002 KETOROLAC TROMETHAMINE PER 15 MG: Performed by: ORTHOPAEDIC SURGERY

## 2020-01-02 PROCEDURE — 63710000001 OXYCODONE 5 MG TABLET: Performed by: ORTHOPAEDIC SURGERY

## 2020-01-02 PROCEDURE — A9270 NON-COVERED ITEM OR SERVICE: HCPCS | Performed by: NURSE PRACTITIONER

## 2020-01-02 PROCEDURE — 63710000001 TAMSULOSIN 0.4 MG CAPSULE: Performed by: NURSE PRACTITIONER

## 2020-01-02 PROCEDURE — 85014 HEMATOCRIT: CPT | Performed by: ORTHOPAEDIC SURGERY

## 2020-01-02 PROCEDURE — 80048 BASIC METABOLIC PNL TOTAL CA: CPT | Performed by: ORTHOPAEDIC SURGERY

## 2020-01-02 PROCEDURE — 25010000002 CLONIDINE PER 1 MG: Performed by: ORTHOPAEDIC SURGERY

## 2020-01-02 PROCEDURE — A9270 NON-COVERED ITEM OR SERVICE: HCPCS | Performed by: ORTHOPAEDIC SURGERY

## 2020-01-02 PROCEDURE — C1713 ANCHOR/SCREW BN/BN,TIS/BN: HCPCS | Performed by: ORTHOPAEDIC SURGERY

## 2020-01-02 PROCEDURE — 85018 HEMOGLOBIN: CPT | Performed by: ORTHOPAEDIC SURGERY

## 2020-01-02 DEVICE — IMPLANTABLE DEVICE: Type: IMPLANTABLE DEVICE | Site: KNEE | Status: FUNCTIONAL

## 2020-01-02 DEVICE — CMT BONE R 1X40: Type: IMPLANTABLE DEVICE | Site: KNEE | Status: FUNCTIONAL

## 2020-01-02 DEVICE — COMP FEM UNI/OXFORD TWIN PEC COCR SM: Type: IMPLANTABLE DEVICE | Site: KNEE | Status: FUNCTIONAL

## 2020-01-02 DEVICE — CAP KN PARTIAL OXFORD: Type: IMPLANTABLE DEVICE | Site: KNEE | Status: FUNCTIONAL

## 2020-01-02 RX ORDER — CEFAZOLIN SODIUM 2 G/100ML
2 INJECTION, SOLUTION INTRAVENOUS EVERY 8 HOURS
Status: COMPLETED | OUTPATIENT
Start: 2020-01-02 | End: 2020-01-03

## 2020-01-02 RX ORDER — SODIUM CHLORIDE, SODIUM LACTATE, POTASSIUM CHLORIDE, CALCIUM CHLORIDE 600; 310; 30; 20 MG/100ML; MG/100ML; MG/100ML; MG/100ML
9 INJECTION, SOLUTION INTRAVENOUS CONTINUOUS PRN
Status: DISCONTINUED | OUTPATIENT
Start: 2020-01-02 | End: 2020-01-03 | Stop reason: HOSPADM

## 2020-01-02 RX ORDER — NALOXONE HCL 0.4 MG/ML
0.1 VIAL (ML) INJECTION
Status: DISCONTINUED | OUTPATIENT
Start: 2020-01-02 | End: 2020-01-03 | Stop reason: HOSPADM

## 2020-01-02 RX ORDER — DIPHENHYDRAMINE HYDROCHLORIDE 50 MG/ML
25 INJECTION INTRAMUSCULAR; INTRAVENOUS EVERY 6 HOURS PRN
Status: DISCONTINUED | OUTPATIENT
Start: 2020-01-02 | End: 2020-01-03 | Stop reason: HOSPADM

## 2020-01-02 RX ORDER — ONDANSETRON 2 MG/ML
INJECTION INTRAMUSCULAR; INTRAVENOUS AS NEEDED
Status: DISCONTINUED | OUTPATIENT
Start: 2020-01-02 | End: 2020-01-02 | Stop reason: SURG

## 2020-01-02 RX ORDER — MELOXICAM 15 MG/1
15 TABLET ORAL ONCE
Status: COMPLETED | OUTPATIENT
Start: 2020-01-02 | End: 2020-01-02

## 2020-01-02 RX ORDER — DOCUSATE SODIUM 100 MG/1
100 CAPSULE, LIQUID FILLED ORAL 2 TIMES DAILY PRN
Status: DISCONTINUED | OUTPATIENT
Start: 2020-01-02 | End: 2020-01-03 | Stop reason: HOSPADM

## 2020-01-02 RX ORDER — PROMETHAZINE HYDROCHLORIDE 25 MG/1
25 SUPPOSITORY RECTAL ONCE AS NEEDED
Status: DISCONTINUED | OUTPATIENT
Start: 2020-01-02 | End: 2020-01-02 | Stop reason: HOSPADM

## 2020-01-02 RX ORDER — ONDANSETRON 4 MG/1
4 TABLET, FILM COATED ORAL EVERY 6 HOURS PRN
Status: DISCONTINUED | OUTPATIENT
Start: 2020-01-02 | End: 2020-01-03 | Stop reason: HOSPADM

## 2020-01-02 RX ORDER — PROMETHAZINE HYDROCHLORIDE 25 MG/ML
6.25 INJECTION, SOLUTION INTRAMUSCULAR; INTRAVENOUS ONCE AS NEEDED
Status: DISCONTINUED | OUTPATIENT
Start: 2020-01-02 | End: 2020-01-02 | Stop reason: HOSPADM

## 2020-01-02 RX ORDER — BUPIVACAINE HYDROCHLORIDE 5 MG/ML
INJECTION, SOLUTION PERINEURAL
Status: COMPLETED | OUTPATIENT
Start: 2020-01-02 | End: 2020-01-02

## 2020-01-02 RX ORDER — FAMOTIDINE 20 MG/1
20 TABLET, FILM COATED ORAL
Status: COMPLETED | OUTPATIENT
Start: 2020-01-02 | End: 2020-01-02

## 2020-01-02 RX ORDER — OXYCODONE HYDROCHLORIDE 5 MG/1
5 TABLET ORAL EVERY 4 HOURS PRN
Status: DISCONTINUED | OUTPATIENT
Start: 2020-01-02 | End: 2020-01-03 | Stop reason: HOSPADM

## 2020-01-02 RX ORDER — LABETALOL HYDROCHLORIDE 5 MG/ML
10 INJECTION, SOLUTION INTRAVENOUS EVERY 4 HOURS PRN
Status: DISCONTINUED | OUTPATIENT
Start: 2020-01-02 | End: 2020-01-03 | Stop reason: HOSPADM

## 2020-01-02 RX ORDER — DEXAMETHASONE SODIUM PHOSPHATE 4 MG/ML
INJECTION, SOLUTION INTRA-ARTICULAR; INTRALESIONAL; INTRAMUSCULAR; INTRAVENOUS; SOFT TISSUE AS NEEDED
Status: DISCONTINUED | OUTPATIENT
Start: 2020-01-02 | End: 2020-01-02 | Stop reason: SURG

## 2020-01-02 RX ORDER — ASPIRIN 81 MG/1
81 TABLET ORAL DAILY
Status: ON HOLD | COMMUNITY
End: 2020-01-03 | Stop reason: SDUPTHER

## 2020-01-02 RX ORDER — ACETAMINOPHEN 500 MG
1000 TABLET ORAL EVERY 8 HOURS
Status: DISCONTINUED | OUTPATIENT
Start: 2020-01-02 | End: 2020-01-03 | Stop reason: HOSPADM

## 2020-01-02 RX ORDER — ACETAMINOPHEN 500 MG
1000 TABLET ORAL ONCE
Status: COMPLETED | OUTPATIENT
Start: 2020-01-02 | End: 2020-01-02

## 2020-01-02 RX ORDER — SODIUM CHLORIDE, SODIUM LACTATE, POTASSIUM CHLORIDE, CALCIUM CHLORIDE 600; 310; 30; 20 MG/100ML; MG/100ML; MG/100ML; MG/100ML
100 INJECTION, SOLUTION INTRAVENOUS CONTINUOUS
Status: DISCONTINUED | OUTPATIENT
Start: 2020-01-02 | End: 2020-01-03 | Stop reason: HOSPADM

## 2020-01-02 RX ORDER — CEFAZOLIN SODIUM 2 G/100ML
2 INJECTION, SOLUTION INTRAVENOUS ONCE
Status: COMPLETED | OUTPATIENT
Start: 2020-01-02 | End: 2020-01-02

## 2020-01-02 RX ORDER — ASPIRIN 81 MG/1
81 TABLET ORAL EVERY 12 HOURS SCHEDULED
Status: DISCONTINUED | OUTPATIENT
Start: 2020-01-03 | End: 2020-01-03 | Stop reason: HOSPADM

## 2020-01-02 RX ORDER — DIPHENHYDRAMINE HCL 25 MG
25 CAPSULE ORAL EVERY 6 HOURS PRN
Status: DISCONTINUED | OUTPATIENT
Start: 2020-01-02 | End: 2020-01-03 | Stop reason: HOSPADM

## 2020-01-02 RX ORDER — KETOROLAC TROMETHAMINE 15 MG/ML
15 INJECTION, SOLUTION INTRAMUSCULAR; INTRAVENOUS EVERY 6 HOURS PRN
Status: DISCONTINUED | OUTPATIENT
Start: 2020-01-02 | End: 2020-01-03 | Stop reason: HOSPADM

## 2020-01-02 RX ORDER — BISACODYL 10 MG
10 SUPPOSITORY, RECTAL RECTAL DAILY PRN
Status: DISCONTINUED | OUTPATIENT
Start: 2020-01-02 | End: 2020-01-03 | Stop reason: HOSPADM

## 2020-01-02 RX ORDER — ACETAMINOPHEN 160 MG
TABLET,DISINTEGRATING ORAL AS NEEDED
Status: DISCONTINUED | OUTPATIENT
Start: 2020-01-02 | End: 2020-01-02 | Stop reason: HOSPADM

## 2020-01-02 RX ORDER — SODIUM CHLORIDE 0.9 % (FLUSH) 0.9 %
10 SYRINGE (ML) INJECTION AS NEEDED
Status: DISCONTINUED | OUTPATIENT
Start: 2020-01-02 | End: 2020-01-02 | Stop reason: HOSPADM

## 2020-01-02 RX ORDER — FENTANYL CITRATE 50 UG/ML
50 INJECTION, SOLUTION INTRAMUSCULAR; INTRAVENOUS
Status: DISCONTINUED | OUTPATIENT
Start: 2020-01-02 | End: 2020-01-02 | Stop reason: HOSPADM

## 2020-01-02 RX ORDER — MAGNESIUM HYDROXIDE 1200 MG/15ML
LIQUID ORAL AS NEEDED
Status: DISCONTINUED | OUTPATIENT
Start: 2020-01-02 | End: 2020-01-02 | Stop reason: HOSPADM

## 2020-01-02 RX ORDER — TAMSULOSIN HYDROCHLORIDE 0.4 MG/1
0.4 CAPSULE ORAL DAILY
Status: DISCONTINUED | OUTPATIENT
Start: 2020-01-02 | End: 2020-01-03 | Stop reason: HOSPADM

## 2020-01-02 RX ORDER — SODIUM CHLORIDE 0.9 % (FLUSH) 0.9 %
10 SYRINGE (ML) INJECTION EVERY 12 HOURS SCHEDULED
Status: DISCONTINUED | OUTPATIENT
Start: 2020-01-02 | End: 2020-01-02 | Stop reason: HOSPADM

## 2020-01-02 RX ORDER — LABETALOL HYDROCHLORIDE 5 MG/ML
5 INJECTION, SOLUTION INTRAVENOUS
Status: DISCONTINUED | OUTPATIENT
Start: 2020-01-02 | End: 2020-01-02 | Stop reason: HOSPADM

## 2020-01-02 RX ORDER — BUPIVACAINE HYDROCHLORIDE 2.5 MG/ML
INJECTION, SOLUTION EPIDURAL; INFILTRATION; INTRACAUDAL
Status: COMPLETED | OUTPATIENT
Start: 2020-01-02 | End: 2020-01-02

## 2020-01-02 RX ORDER — HYDRALAZINE HYDROCHLORIDE 20 MG/ML
5 INJECTION INTRAMUSCULAR; INTRAVENOUS
Status: DISCONTINUED | OUTPATIENT
Start: 2020-01-02 | End: 2020-01-02 | Stop reason: HOSPADM

## 2020-01-02 RX ORDER — PREGABALIN 75 MG/1
75 CAPSULE ORAL ONCE
Status: COMPLETED | OUTPATIENT
Start: 2020-01-02 | End: 2020-01-02

## 2020-01-02 RX ORDER — AMLODIPINE BESYLATE 5 MG/1
5 TABLET ORAL DAILY
Status: DISCONTINUED | OUTPATIENT
Start: 2020-01-02 | End: 2020-01-03 | Stop reason: HOSPADM

## 2020-01-02 RX ORDER — MELOXICAM 7.5 MG/1
15 TABLET ORAL DAILY
Status: DISCONTINUED | OUTPATIENT
Start: 2020-01-03 | End: 2020-01-03 | Stop reason: HOSPADM

## 2020-01-02 RX ORDER — LIDOCAINE HYDROCHLORIDE 10 MG/ML
0.5 INJECTION, SOLUTION EPIDURAL; INFILTRATION; INTRACAUDAL; PERINEURAL ONCE AS NEEDED
Status: COMPLETED | OUTPATIENT
Start: 2020-01-02 | End: 2020-01-02

## 2020-01-02 RX ORDER — ONDANSETRON 2 MG/ML
4 INJECTION INTRAMUSCULAR; INTRAVENOUS EVERY 6 HOURS PRN
Status: DISCONTINUED | OUTPATIENT
Start: 2020-01-02 | End: 2020-01-03 | Stop reason: HOSPADM

## 2020-01-02 RX ORDER — PROMETHAZINE HYDROCHLORIDE 25 MG/1
25 TABLET ORAL ONCE AS NEEDED
Status: DISCONTINUED | OUTPATIENT
Start: 2020-01-02 | End: 2020-01-02 | Stop reason: HOSPADM

## 2020-01-02 RX ADMIN — TRANEXAMIC ACID 1000 MG: 100 INJECTION, SOLUTION INTRAVENOUS at 08:56

## 2020-01-02 RX ADMIN — MELOXICAM 15 MG: 15 TABLET ORAL at 06:33

## 2020-01-02 RX ADMIN — ACETAMINOPHEN 1000 MG: 500 TABLET ORAL at 06:33

## 2020-01-02 RX ADMIN — LIDOCAINE HYDROCHLORIDE 0.2 ML: 10 INJECTION, SOLUTION EPIDURAL; INFILTRATION; INTRACAUDAL; PERINEURAL at 06:21

## 2020-01-02 RX ADMIN — PROPOFOL 25 MCG/KG/MIN: 10 INJECTION, EMULSION INTRAVENOUS at 07:42

## 2020-01-02 RX ADMIN — BUPIVACAINE HYDROCHLORIDE 1.6 ML: 5 INJECTION, SOLUTION PERINEURAL at 07:39

## 2020-01-02 RX ADMIN — PREGABALIN 75 MG: 75 CAPSULE ORAL at 06:33

## 2020-01-02 RX ADMIN — AMLODIPINE BESYLATE 5 MG: 5 TABLET ORAL at 11:42

## 2020-01-02 RX ADMIN — SODIUM CHLORIDE, POTASSIUM CHLORIDE, SODIUM LACTATE AND CALCIUM CHLORIDE 9 ML/HR: 600; 310; 30; 20 INJECTION, SOLUTION INTRAVENOUS at 06:21

## 2020-01-02 RX ADMIN — CEFAZOLIN SODIUM 2 G: 2 INJECTION, SOLUTION INTRAVENOUS at 15:04

## 2020-01-02 RX ADMIN — ACETAMINOPHEN 1000 MG: 500 TABLET, FILM COATED ORAL at 11:12

## 2020-01-02 RX ADMIN — FAMOTIDINE 20 MG: 20 TABLET ORAL at 06:33

## 2020-01-02 RX ADMIN — CEFAZOLIN SODIUM 2 G: 2 INJECTION, SOLUTION INTRAVENOUS at 07:54

## 2020-01-02 RX ADMIN — ACETAMINOPHEN 1000 MG: 500 TABLET, FILM COATED ORAL at 20:07

## 2020-01-02 RX ADMIN — ONDANSETRON 4 MG: 2 INJECTION INTRAMUSCULAR; INTRAVENOUS at 08:57

## 2020-01-02 RX ADMIN — SODIUM CHLORIDE, POTASSIUM CHLORIDE, SODIUM LACTATE AND CALCIUM CHLORIDE 100 ML/HR: 600; 310; 30; 20 INJECTION, SOLUTION INTRAVENOUS at 10:55

## 2020-01-02 RX ADMIN — DEXAMETHASONE SODIUM PHOSPHATE 8 MG: 4 INJECTION, SOLUTION INTRAMUSCULAR; INTRAVENOUS at 07:45

## 2020-01-02 RX ADMIN — TAMSULOSIN HYDROCHLORIDE 0.4 MG: 0.4 CAPSULE ORAL at 11:42

## 2020-01-02 RX ADMIN — TRANEXAMIC ACID 1000 MG: 100 INJECTION, SOLUTION INTRAVENOUS at 07:42

## 2020-01-02 RX ADMIN — CEFAZOLIN SODIUM 2 G: 2 INJECTION, SOLUTION INTRAVENOUS at 23:53

## 2020-01-02 RX ADMIN — ROPIVACAINE HYDROCHLORIDE 10 ML/HR: 5 INJECTION, SOLUTION EPIDURAL; INFILTRATION; PERINEURAL at 10:03

## 2020-01-02 RX ADMIN — OXYCODONE HYDROCHLORIDE 5 MG: 5 TABLET ORAL at 17:04

## 2020-01-02 RX ADMIN — BUPIVACAINE HYDROCHLORIDE 20 ML: 2.5 INJECTION, SOLUTION EPIDURAL; INFILTRATION; INTRACAUDAL; PERINEURAL at 10:00

## 2020-01-02 NOTE — THERAPY EVALUATION
Patient Name: Sarai Hardy  : 1945    MRN: [id number]                              Today's Date: 2020       Admit Date: 2020    Visit Dx: No diagnosis found.  Patient Active Problem List   Diagnosis   • Back pain   • S/P lumbar fusion   • HTN (hypertension)   • GERD (gastroesophageal reflux disease)   • Leukocytosis, likely reactive   • Acute blood loss anemia, mild, asymptomatic   • S/P lumbar discectomy   • Acute postoperative pain   • Primary osteoarthritis of right knee   • S/P right unicompartmental knee replacement   • Acute urinary retention   • Knee pain     Past Medical History:   Diagnosis Date   • Arthritis    • Back pain    • Heart murmur     seen by Dr dias in the past but not necessary to see cardiologist anymore    • Hypertension    • Slow to wake up after anesthesia    • Wears glasses      Past Surgical History:   Procedure Laterality Date   • BLADDER SURGERY     • COLONOSCOPY     • EYE SURGERY      cataract extraction    • HYSTERECTOMY     • LUMBAR DISCECTOMY N/A 8/15/2018    Procedure: LUMBAR DISCECTOMY L2-3;  Surgeon: Micah Duarte MD;  Location:  [facility];  Service: Orthopedic Spine   • LUMBAR DISCECTOMY FUSION INSTRUMENTATION N/A 11/15/2017    Procedure: LUMBAR DECOMPRESSION AND FUSION WITH INSTRUMENTATION;  Surgeon: Micah Duarte MD;  Location:  [facility];  Service:    • OOPHORECTOMY Bilateral    • TONSILLECTOMY       General Information     Row Name 20 1427          PT Evaluation Time/Intention    Document Type  evaluation  -LR     Mode of Treatment  physical therapy;individual therapy  -LR     Row Name 20 1427          General Information    Patient Profile Reviewed?  yes  -LR     Prior Level of Function  min assist:;all household mobility;community mobility;gait;transfer;bed mobility;ADL's;home management;cooking;cleaning;using stairs;shopping;independent:;driving all mobility limited by pain  -LR     Existing Precautions/Restrictions   fall;other (see comments) R adductor canal nerve catheter  -LR     Barriers to Rehab  none identified  -LR     Row Name 01/02/20 1427          Relationship/Environment    Lives With  spouse assist at all times  -LR     Row Name 01/02/20 1427          Resource/Environmental Concerns    Current Living Arrangements  home/apartment/condo  -LR     Row Name 01/02/20 1427          Home Main Entrance    Number of Stairs, Main Entrance  none  -LR     Row Name 01/02/20 1427          Stairs Within Home, Primary    Number of Stairs, Within Home, Primary  none  -LR     Row Name 01/02/20 1427          Cognitive Assessment/Intervention- PT/OT    Orientation Status (Cognition)  oriented x 4  -LR     Row Name 01/02/20 1427          Safety Issues, Functional Mobility    Safety Issues Affecting Function (Mobility)  safety precaution awareness;safety precautions follow-through/compliance  -LR     Impairments Affecting Function (Mobility)  pain;strength;range of motion (ROM)  -LR       User Key  (r) = Recorded By, (t) = Taken By, (c) = Cosigned By    Initials Name Provider Type    LR Khushboo Aviles, PT Physical Therapist        Mobility     Row Name 01/02/20 1427          Bed Mobility Assessment/Treatment    Bed Mobility Assessment/Treatment  supine-sit  -LR     Supine-Sit [place] (Bed Mobility)  verbal cues;supervision  -LR     Assistive Device (Bed Mobility)  head of bed elevated;bed rails  -LR     Comment (Bed Mobility)  Verbal cues to move LEs towards EOB and to push up from bed to raise trunk into sitting and to scoot hips out to get feet on floor. Assisted patient with donning pants at EOB. Denied dizziness upon sitting up.   -LR     Row Name 01/02/20 1427          Transfer Assessment/Treatment    Comment (Transfers)  Verbal cues to push up from bed to stand and to reach back for chair to lower into sitting. Verbal cues to step R LE out before t/f for comfort.   -     Row Name 01/02/20 1427          Sit-Stand  Transfer    Sit-Stand [place] (Transfers)  verbal cues;contact guard;2 person assist  -LR     Assistive Device (Sit-Stand Transfers)  walker, front-wheeled  -LR     Row Name 01/02/20 1427          Gait/Stairs Assessment/Training    00552 - Gait Training Minutes   6  -LR     [place] Level (Gait)  verbal cues;contact guard;2 person assist  -LR     Assistive Device (Gait)  walker, front-wheeled  -LR     Distance in Feet (Gait)  400  -LR     Pattern (Gait)  step-through  -LR     Deviations/Abnormal Patterns (Gait)  bilateral deviations;codie decreased;gait speed decreased;stride length decreased  -LR     Bilateral Gait Deviations  forward flexed posture;heel strike decreased  -LR     Right Sided Gait Deviations  weight shift ability decreased  -LR     Comment (Gait/Stairs)  Patient ambulated with step through gait pattern at slow pace. Verbal cues for correct sequencing of steps, increased L LE weight bearing/stance phase, decreased UE weight bearing, and increased R knee flexion during swing phase. Improved with cues for correction. Valgus at R knee during stance phase. Cues to keep RW close and to stay inside RW. Gait limited by fatigue.   -     Row Name 01/02/20 1427          Mobility Assessment/Intervention    Extremity Weight-bearing Status  right lower extremity  -LR     Right Lower Extremity (Weight-bearing Status)  weight-bearing as tolerated (WBAT)  -LR       User Key  (r) = Recorded By, (t) = Taken By, (c) = Cosigned By    Initials Name Provider Type    Khushboo Barnard, PT Physical Therapist        Obj/Interventions     Row Name 01/02/20 1427          General ROM    GENERAL ROM COMMENTS  L LE AROM WFL; R knee AROM impaired 25%, will formally measure POD#1  -LR     Row Name 01/02/20 1427          MMT (Manual Muscle Testing)    General MMT Comments  L LE WFL;  R knee functionally 4-/5, independent with SLR, no knee buckling with weight bearing.   -     Row Name 01/02/20 1427           Therapeutic Exercise    Lower Extremity (Therapeutic Exercise)  gluteal sets;quad sets, right  -LR     Lower Extremity Range of Motion (Therapeutic Exercise)  ankle dorsiflexion/plantar flexion, right  -LR     Exercise Type (Therapeutic Exercise)  AROM (active range of motion);isotonic contraction, concentric;isometric contraction, static  -LR     Position (Therapeutic Exercise)  supine  -LR     Sets/Reps (Therapeutic Exercise)  x10 reps each  -LR     Comment (Therapeutic Exercise)  cues for technique; able to actively DF bilaterally  -LR     Row Name 01/02/20 1427          Sensory Assessment/Intervention    Sensory General Assessment  no sensation deficits identified denies numbness/tingling;light touch equal and intact  -LR       User Key  (r) = Recorded By, (t) = Taken By, (c) = Cosigned By    Initials Name Provider Type    LR Khushboo Aviles, PT Physical Therapist        Goals/Plan     Row Name 01/02/20 1427          Bed Mobility Goal 1 (PT)    Activity/Assistive Device (Bed Mobility Goal 1, PT)  sit to supine/supine to sit  -LR     [place] Level/Cues Needed (Bed Mobility Goal 1, PT)  conditional independence  -LR     Time Frame (Bed Mobility Goal 1, PT)  long term goal (LTG);3 days  -LR     Progress/Outcomes (Bed Mobility Goal 1, PT)  goal ongoing  -     Row Name 01/02/20 1427          Transfer Goal 1 (PT)    Activity/Assistive Device (Transfer Goal 1, PT)  sit-to-stand/stand-to-sit;walker, rolling  -LR     [place] Level/Cues Needed (Transfer Goal 1, PT)  conditional independence  -LR     Time Frame (Transfer Goal 1, PT)  long term goal (LTG);3 days  -LR     Progress/Outcome (Transfer Goal 1, PT)  goal ongoing  -     Row Name 01/02/20 1427          Gait Training Goal 1 (PT)    Activity/Assistive Device (Gait Training Goal 1, PT)  gait (walking locomotion);walker, rolling  -LR     [place] Level (Gait Training Goal 1, PT)  conditional independence  -LR     Distance (Gait Goal 1, PT)  500  feet  -LR     Time Frame (Gait Training Goal 1, PT)  long term goal (LTG)  -LR     Progress/Outcome (Gait Training Goal 1, PT)  goal ongoing  -LR     Row Name 01/02/20 1427          ROM Goal 1 (PT)    ROM Goal 1 (PT)  0-90 degrees AAROM R knee  -LR     Time Frame (ROM Goal 1, PT)  long term goal (LTG);3 days  -LR     Progress/Outcome (ROM Goal 1, PT)  goal ongoing  -LR       User Key  (r) = Recorded By, (t) = Taken By, (c) = Cosigned By    Initials Name Provider Type    Khushboo Barnard, PT Physical Therapist        Clinical Impression     Row Name 01/02/20 1427          Pain Assessment    Additional Documentation  Pain Scale: Numbers Pre/Post-Treatment (Group)  -LR     Row Name 01/02/20 1427          Pain Scale: Numbers Pre/Post-Treatment    Pain Scale: Numbers, Pretreatment  0/10 - no pain  -LR     Pain Scale: Numbers, Post-Treatment  0/10 - no pain  -LR     Pain Location - Side  Right  -LR     Pain Location  knee  -LR     Pain Intervention(s)  Ambulation/increased activity;Repositioned  -LR     Row Name 01/02/20 1427          Plan of Care Review    Plan of Care Reviewed With  patient  -LR     Progress  improving  -LR     Row Name 01/02/20 1427          Physical Therapy Clinical Impression    Patient/Family Goals Statement (PT Clinical Impression)  go home  -LR     Criteria for Skilled Interventions Met (PT Clinical Impression)  yes;treatment indicated  -LR     Rehab Potential (PT Clinical Summary)  good, to achieve stated therapy goals  -LR     Row Name 01/02/20 1427          Positioning and Restraints    Pre-Treatment Position  in bed  -LR     Post Treatment Position  chair  -LR     In Chair  notified nsg;reclined;sitting;call light within reach;encouraged to call for assist;exit alarm on;with family/caregiver;legs elevated;compression device  -LR       User Key  (r) = Recorded By, (t) = Taken By, (c) = Cosigned By    Initials Name Provider Type    Khushboo Barnard, PT Physical Therapist         Outcome Measures     Row Name 01/02/20 1427          How much help from another person do you currently need...    Turning from your back to your side while in flat bed without using bedrails?  4  -LR     Moving from lying on back to sitting on the side of a flat bed without bedrails?  3  -LR     Moving to and from a bed to a chair (including a wheelchair)?  3  -LR     Standing up from a chair using your arms (e.g., wheelchair, bedside chair)?  3  -LR     Climbing 3-5 steps with a railing?  3  -LR     To walk in hospital room?  3  -LR     AM-PAC 6 Clicks Score (PT)  19  -LR     Row Name 01/02/20 1427          Functional Assessment    Outcome Measure Options  AM-PAC 6 Clicks Basic Mobility (PT)  -LR       User Key  (r) = Recorded By, (t) = Taken By, (c) = Cosigned By    Initials Name Provider Type    LR Khushboo Aviles, PT Physical Therapist          PT Recommendation and Plan  Planned Therapy Interventions (PT Eval): balance training, bed mobility training, gait training, home exercise program, stair training, ROM (range of motion), patient/family education, strengthening, transfer training  Outcome Summary/Treatment Plan (PT)  Anticipated Equipment Needs at Discharge (PT): (none)  Anticipated Discharge Disposition (PT): home with assist, home with home health  Plan of Care Reviewed With: patient  Progress: improving  Outcome Summary: Patient ambulated 400 feet with RW and step through gait pattern, limited by pain. ROM to be initiated POD#1. Encouraged patient to ambulate with nursing again later this afternoon. Plan is d/c home with family and HHPT. Will continue to progress as able. PADD score of 8.     Time Calculation:   PT Charges     Row Name 01/02/20 1427             Time Calculation    Start Time  1427  -LR      PT Received On  01/02/20  -LR      PT Goal Re-Cert Due Date  01/12/20  -LR         Time Calculation- PT    Total Timed Code Minutes- PT  8 minute(s)  -LR         Timed Charges    69016 -  PT Therapeutic Exercise Minutes  2  -LR      12866 - Gait Training Minutes   6  -LR        User Key  (r) = Recorded By, (t) = Taken By, (c) = Cosigned By    Initials Name Provider Type    Khushboo Barnard, PT Physical Therapist        Therapy Charges for Today     Code Description Service Date Service Provider Modifiers Qty    40508413159  GAIT TRAINING EA 15 MIN 1/2/2020 Khushboo Aviles, PT GP 1    35930256064 HC PT THER SUPP EA 15 MIN 1/2/2020 Khushboo Aviles, PT GP 2    [id number]  PT EVAL LOW COMPLEXITY 3 1/2/2020 Khushboo Aviles, PT GP 1          PT G-Codes  Outcome Measure Options: AM-PAC 6 Clicks Basic Mobility (PT)  AM-PAC 6 Clicks Score (PT): 19    Khushboo Aviles, PT  1/2/2020

## 2020-01-02 NOTE — PLAN OF CARE
Problem: Patient Care Overview  Goal: Plan of Care Review  Outcome: Ongoing (interventions implemented as appropriate)  Flowsheets (Taken 1/2/2020 2913)  Outcome Summary: Patient ambulated 400 feet with RW and step through gait pattern, limited by pain. ROM to be initiated POD#1. Encouraged patient to ambulate with nursing again later this afternoon. Plan is d/c home with family and HHPT. Will continue to progress as able. PADD score of 8. Recommend OT consult for ADL and adaptive equipment training.

## 2020-01-02 NOTE — OP NOTE
KNEE ARTHROPLASTY, PARTIAL REPLACEMENT  Procedure Report    Patient Name:  Sarai Hardy  YOB: 1945    Date of Surgery:  1/2/2020     Indications:      Patient is a 74 y.o. female with end-stage degenerative joint disease involving the medial compartment. The patient has failed conservative medical measures including activity modification and medical management. The risks, benefits, alternative treatments and potential outcomes of the medial compartment unicondylar arthroplasty have been described to the patient in detail. Likely risk benefits of the procedure including but not limited to infection, DVT, pulmonary embolism, future loosening of the implants, possibility of injury to tendons, ligaments, nerves and/or vessels and periprosthetic fracture been discussed in detail. The patient appears to understand these risks and appears to be a good candidate with full correction on their preoperative stress films, and their anterior cruciate ligament appears to be intact radiographically. We, therefore, proceed to the operating room, after consent was obtained for medial compartment unicondylar arthroplasty.    Patient Identification:  Patient was seen in the prep, consent was reviewed, operative procedure was identified, surgical site and thigh marked.      Pre-op Diagnosis:   Arthritis of right knee [356634]       Post-Op Diagnosis Codes:     * Arthritis of right knee [M17.11]    Procedure/CPT® Codes:      Procedure(s):  RIGHT PARTIAL KNEE ARTHROPLASTY    Staff:  Surgeon(s):  Panda Obando MD    Assistant: Yesica Christianson PA-C    Anesthesia: Spinal    Estimated Blood Loss: 100ml    Implants:    Implant Name Type Inv. Item Serial No.  Lot No. LRB No. Used   CMT BONE R 1X40 - [id number] Implant CMT BONE R 1X40  [employer] [id number] Right 1   COMP FEM UNI/OXFORD TWIN PEC COCR SM - [id number] Implant COMP FEM UNI/OXFORD TWIN PEC COCR SM  [employer] [id number] Right 1   TRY TIB  UNI/[place] R/M [facility] [id number] Implant TRY TIB UNI/[place] R/M [facility] [place]  [employer] [id number] Right 1   BEAR MENISC OXFORD LUIS MANUEL SZ4 RT SM - [id number] Implant BEAR MENISC OXFORD LUIS MANUEL SZ4 RT SM  [employer] [id number] Right 1       Specimen:          None      Findings:  Bone/cartilage:    -Degenerative disease confined to the medial compartment was identified with medial osteophytes. Patellofemoral articulation was  grade 1 . The anterior cruciate ligament was preserved and what could be visualized of the lateral compartment was grade 1.        Complications: none    Description of Procedure:   The patient was transferred to [facility] operating room. Preoperative antibiotics in the form of  Kefzol 2gm as well as 1 g of tranexamic acid were given IV and infused prior to skin incision and to inflation of the tourniquet according to skip protocol. Prior to skin incision the patient received Spinal. Prior to performance of surgical procedure, a time out was performed to identify the patient, date of birth, pertinent allergies, surgical procedure, surgical site, preoperative medications to include preoperative antibiotics given, preoperative x-rays and relevant images and results ate noted and displayed, and availability of implants and supplies. A well padded tourniquet was placed to the proximal aspect of the operative thigh.  The operative leg was placed with the hip flexed 10 degrees in a hanging leg reyna well-padded around the area of the tourniquet.  The nonoperative leg was placed in a well-leg stirrup the distal aspect of the bed was dropped.  There was good motion of the operative leg with flexion to 110 degrees. The operative leg was then prepped and draped in the usual sterile fashion the tourniquet after application of Esmarch the tourniquet was inflated to 250 mg mercury.  A skin incision was made vertically oriented centering over the medial aspect of the patella anteriorly. The skin and  subcutaneous tissue were incised and 2a Medial Parapatellar   approach was developed there was a significant effusion.  Retractors were placed both medially and behind the patella small amount of the fat pad was removed and the knee was inspected. the patella was subluxed over the knee there is grade 4 loss of the articular cartilage on the medial distal femoral condyle with corresponding areas loss of articular cartilage of the medial tibial plateau, this was into the anterior medial pattern. There were grade 1 cartilage changes in the lateral tibial compartment. There were grade 1 in the patella femoral joint.  The ACL was intact to probing.  Osteophytes were removed from the medial femoral condyle as well as sitting present in the ACL notch.  Care was made to protect the MCL.  We began by sizing the femur with a small, 1 spoon.  We then attached the tibial cutting guide with care to ensure proper alignment with the tibial crest and appropriate slope this was pinned into place and are tibial resection was made.  A reciprocating saw was used first a vertical cut made just medial to the footprint of the ACL in line with the ASIS.  We then made a horizontal cut with appropriate slope and then use the resected piece of tibia to size the tibia to a C.    We then turned our attention to the femur we drilled our intramedullary alignment shahnaz hole approximately 1 cm anterior to the medial side of the femoral notch and then inserted our intramedullary guide.  Then attached our femoral drill guide, care was made to make sure that this was placed in the middle of the femoral condyle and in line that had been previously marked.   Drill holes were made and the, posterior femoral resection guide was placed on in the posterior femoral resection was completed.  We then inserted a 0 spigot and milled out the distal femur.  With the trial tibia in place we then tested the flexion gap and found a  4 paddle to have appropriate  tension.  The knee was then brought into 30 degrees of flexion and the 1 paddle had appropriate tension.  We then decided to use a  3 spigot and reaming of the distal femur was performed.  Trial femur was then placed.  Using a 4 paddle both flexion and extension gaps felt symmetric there is good range of motion of the knee with full extension and good flexion.  We then performed anterior and posterior condyle resections to reduce the risk of impingement.  Following this tibial tray preparation was performed using a size C tibial tray prepped.  Care was taken to ensure that the tray set all the way posterior and had good medial coverage.  We then inserted a trial bearing and the knee had good range of motion and good ligamentous tension throughout motion.  All components were then removed.  Copious amounts of irrigation and peroxide were used to remove all fat marrow degrees for good cement mantle.  Several small drill holes were made in order to help interdigitation of the cement on both the femur and the tibia.  Cement was placed in a thin layer on both the undersurface of the components and then we cemented first the tibia care was made to place a cement from a posterior to anterior direction in order to prevent any extravasation posteriorly.  We then inserted a trial femoral component and a 5 paddle to pressurize the tibial tray.  The trial component was then removed from the femur and the femur was cemented into place.  We used a 5 paddle and the knee was held at 45 degrees of flexion until the cement hardened.  Following this paddle was removed all excess cement was removed.  Final bearing component size4 was then inserted into place.  The knee was taken through range of motion and again found to have no impingement.     Having been satisfied with this the joint was thoroughly irrigated first a Betadine wash followed by 3 L of saline.     The sponge needle and instrument counts are found to be correct. The  arthrotomy was closed with interrupted 0 Vicryl's followed by a running strata fix #2. 2-0 Vicryl for skin followed by a Monocryl and perneo/demabond with the knee held in flexion. Sterile silver impregnated bandage was then placed over the incision. The leg was wrapped with web roll and overwrapped with Ace wrap. The patient tolerated the procedure well and is being admitted for postoperative antibiotics according to skip protocol 2 more doses in the first 24 hours. The patient will be on  aspirin 81 mg twice a day starting postoperative day #1. Patient will need to be mobilized with physical therapy.  I discussed the satisfactory performance of the procedure with patient's family and discussed with him the postoperative management.        Assistant Participation:  Surgeon(s):  Panda Obando MD    Assistant: Yesica Christianson PA-C assisted with proper preoperative positioning, preoperative templating, determingin availability of proper implants, prepping and draping of patient, manipulation placement of instruments, protection of ligaments and vital soft tissue structures, assistance in maintaining hemostasis and assistance with closure of the wound. Their skills and knowledge of the steps of operation and the desired outcome of each surgical step was crucial, allowing for efficient choreography of surgical procedure, and closure of the wound which lead to reduced surgical time, less blood loss, and less risk of complications for the patient.     Panda Obando MD     Date: 1/2/2020  Time: 9:24 AM

## 2020-01-02 NOTE — ANESTHESIA PREPROCEDURE EVALUATION
Anesthesia Evaluation     Patient summary reviewed and Nursing notes reviewed   history of anesthetic complications: prolonged sedation  NPO Solid Status: > 8 hours  NPO Liquid Status: > 2 hours           Airway   Mallampati: III  TM distance: >3 FB  Neck ROM: full  Possible difficult intubation  Dental - normal exam         Pulmonary    (+) decreased breath sounds,   Cardiovascular   Exercise tolerance: good (4-7 METS)    Rhythm: regular  Rate: normal    (+) hypertension well controlled less than 2 medications, valvular problems/murmurs murmur,       Neuro/Psych  GI/Hepatic/Renal/Endo    (+)  GERD well controlled,      Musculoskeletal     (+) back pain,   Abdominal   (+) obese,     Abdomen: soft.   Substance History      OB/GYN          Other   arthritis,                      Anesthesia Plan    ASA 3     spinal and regional     intravenous induction     Anesthetic plan, all risks, benefits, and alternatives have been provided, discussed and informed consent has been obtained with: patient.    Plan discussed with CRNA.

## 2020-01-02 NOTE — H&P
Patient Care Team:      Chief complaint  Right knee    Subjective:    Patient is a 74 y.o.female presents with history of right knee pain.   She has received steroid injections with limited improvement.   She was a difficult historian; changed simple answers often.     Review of Systems:  General ROS: negative  Cardiovascular ROS: no chest pain or dyspnea on exertion  States she has a murmur  Respiratory ROS: no cough, shortness of breath, or wheezing      Allergies:   Allergies   Allergen Reactions   • Codeine Dizziness   • Morphine And Related Other (See Comments)     Makes goofy and laughs          Latex: neg  Contrast Dye  neg    Home Meds    Medications Prior to Admission   Medication Sig Dispense Refill Last Dose   • amLODIPine (NORVASC) 5 MG tablet Take 5 mg by mouth Daily.   1/1/2020 at 0600   • aspirin 81 MG EC tablet Take 81 mg by mouth Daily.   1/2/2020 at 0430   • Multiple Vitamins-Minerals (CENTRUM ADULTS PO) Take 1 tablet by mouth Daily.   1/1/2020 at 1630   • mupirocin (BACTROBAN) 2 % ointment 1 application into the nostril(s) as directed by provider Daily.   1/2/2020 at 0430   • estradiol (CLIMARA) 0.1 MG/24HR patch Place 1 patch on the skin as directed by provider 1 (One) Time Per Week. Currently on left lower abdomen.   12/28/2019     PMH:   Past Medical History:   Diagnosis Date   • Arthritis    • Back pain    • Heart murmur     seen by Dr dias in the past but not necessary to see cardiologist anymore    • Hypertension    • Slow to wake up after anesthesia    • Wears glasses      PSH:    Past Surgical History:   Procedure Laterality Date   • BLADDER SURGERY     • COLONOSCOPY     • EYE SURGERY      cataract extraction    • HYSTERECTOMY  1992   • LUMBAR DISCECTOMY N/A 8/15/2018    Procedure: LUMBAR DISCECTOMY L2-3;  Surgeon: Micah Duarte MD;  Location: [facility];  Service: Orthopedic Spine   • LUMBAR DISCECTOMY FUSION INSTRUMENTATION N/A 11/15/2017    Procedure: LUMBAR DECOMPRESSION AND  "FUSION WITH INSTRUMENTATION;  Surgeon: Micah Duarte MD;  Location: [facility];  Service:    • OOPHORECTOMY Bilateral 1992   • TONSILLECTOMY       Immunization History: pneumo up to date    Flu  2019  Tetanus  ?  Social History:   Tobacco quit 30 years   Alcohol neg      Physical Exam:/82 (BP Location: Right arm, Patient Position: Lying)   Pulse 98   Temp 97.4 °F (36.3 °C) (Temporal)   Resp 18   Ht 154.9 cm (61\")   Wt 61.2 kg (135 lb)   SpO2 94%   BMI 25.51 kg/m²       General Appearance:    Alert, cooperative, no distress, appears stated age   Head:    Normocephalic, without obvious abnormality, atraumatic   Lungs:     Clear to auscultation bilaterally, respirations unlabored    Heart: Regular rate and rhythm, S1 and S2 normal, no murmur, rub    or gallop    Abdomen:    Soft without tenderness   Breast Exam:    deferred   Genitalia:    deferred   Extremities:   Extremities normal, atraumatic, no cyanosis or edema   Skin:   Skin color, texture, turgor normal, no rashes or lesions   Neurologic:   Grossly intact     Results Review:   LABS:  Lab Results   Component Value Date    WBC 5.73 12/19/2019    HGB 13.2 12/19/2019    HCT 38.6 12/19/2019    MCV 84.6 12/19/2019     12/19/2019    NEUTROABS 3.47 12/19/2019    GLUCOSE 166 (H) 12/19/2019    BUN 17 12/19/2019    CREATININE 0.70 12/19/2019    EGFRIFNONA 82 12/19/2019     12/19/2019    K 3.9 12/19/2019     12/19/2019    CO2 25.0 12/19/2019    CALCIUM 9.3 12/19/2019    ALBUMIN 4.70 12/19/2019    AST 22 12/19/2019    ALT 13 12/19/2019    BILITOT 0.4 12/19/2019       RADIOLOGY:  Imaging Results (Last 72 Hours)     ** No results found for the last 72 hours. **               Impression: osteoarthritis right knee    Plan: right partial versus total knee arthroplasty  Rosette Taveras PA-C 1/2/2020 6:43 AM        "

## 2020-01-02 NOTE — ANESTHESIA PROCEDURE NOTES
Peripheral Block      Patient reassessed immediately prior to procedure    Patient location during procedure: post-op  Reason for block: at surgeon's request and post-op pain management  Performed by  CRNA: Raeann Patricia CRNA  Preanesthetic Checklist  Completed: patient identified, site marked, surgical consent, pre-op evaluation, timeout performed, IV checked, risks and benefits discussed and monitors and equipment checked  Prep:  Pt Position: supine  Sterile barriers:cap, gloves, mask and sterile barriers  Prep: ChloraPrep  Patient monitoring: blood pressure monitoring, continuous pulse oximetry and EKG  Procedure  Performed under: spinal  Guidance:ultrasound guided  Images:still images obtained, printed/placed on chart    Laterality:right  Block Type:adductor canal block  Injection Technique:catheter  Needle Type:Tuohy and echogenic  Needle Gauge:18 G  Resistance on Injection: none  Catheter Size:20 G (20g)  Cath Depth at skin: 14 cm    Medications Used: bupivacaine PF (MARCAINE) 0.25 % injection, 20 mL  Med admintered at 1/2/2020 10:00 AM      Post Assessment  Injection Assessment: negative aspiration for heme, incremental injection and no paresthesia on injection  Patient Tolerance:comfortable throughout block  Complications:no  Additional Notes  Procedure:             The pt was placed in the Supine position.  The Insertion site was  prepped and Draped in sterile fashion.  The pt was anesthetized with  IV Sedation( see meds).  Skin and cutaneous tissue was infiltrated and anesthetized with 1% Lidocaine 3 mls via a 25g needle.  A BBraun 4 inch 18g echogenic needle was then  inserted approximately midline, mid-thigh and advanced In-plane with Ultrasound guidance.  Normal Saline PSF was utilized for hydrodissection of tissue.  The Vastus medialis and Sartorius muscle where visualized and the needle tip was placed in the adductor canal,  lateral to the femoral artery.  LA injection spread was visualized,  injection was incremental 1-5ml, injection pressure was normal or little, no intraneural injection, no vascular injection.  LA dose was injected thru the needle(see dose above).  A BBraun 20g wire stylet catheter was placed via the needle with ultrasound visualization and confirmation with NS fluid bolus. The labeled Catheter was then secured to skin at insertion site with skin afix and steristrips to curled catheter and CHG transparent dressing.  Thank you.

## 2020-01-02 NOTE — ANESTHESIA PROCEDURE NOTES
Spinal Block      Patient reassessed immediately prior to procedure    Patient location during procedure: OR  Start Time: 1/2/2020 7:39 AM  Indication:at surgeon's request  Performed By  CRNA: Raeann Patricia CRNA  Preanesthetic Checklist  Completed: patient identified, site marked, surgical consent, pre-op evaluation, timeout performed, IV checked, risks and benefits discussed and monitors and equipment checked  Spinal Block Prep:  Patient Position:sitting  Sterile Tech:cap, gloves, sterile barriers and mask  Prep:Chloraprep  Patient Monitoring:blood pressure monitoring, continuous pulse oximetry and EKG  Spinal Block Procedure  Approach:midline  Guidance:landmark technique and palpation technique  Location:L3-L4  Needle Type:Sprotte  Needle Gauge:25 G  Placement of Spinal needle event:cerebrospinal fluid aspirated  Paresthesia: no  Fluid Appearance:clear  Medications: bupivacaine (MARCAINE) 0.5 % injection, 1.6 mL  Med Administered at 1/2/2020 7:39 AM   Post Assessment  Patient Tolerance:patient tolerated the procedure well with no apparent complications  Complications no  Additional Notes  Procedure:  Pt assisted to sitting position, with legs in position of comfort over side of bed.  Pt. instructed in optimal spine presentation, the spine was prepped/ Draped and the skin at insertion site was anesthetized with 1% Lidocaine 2 ml.  The spinal needle was then advanced until CSF flow was obtained and LA was injected:

## 2020-01-02 NOTE — H&P
Patient Name: Sarai Hardy  MRN: [id number]  : 1945  DOS: 2020    Attending: Panda Obando MD    Primary Care Provider: Pam Reid MD      Chief complaint: Right knee pain    Subjective   Patient is a 74 y.o. female presented for right partial knee arthroplasty by Dr. Obando.    She underwent surgery under spinal anesthesia.  She tolerated surgery well and is admitted for further medical management.  He has been painful for several years.  She denies use of assistive device for ambulation or recent falls.     She is known to us from several previous admissions most recently 2018 for lumbar discectomy; which she recovered well.     When seen postop she complains of severe lower abdominal pain.  She denies knee pain, but is still under effects of spinal anesthesia.  She denies nausea, shortness of breath or chest pain.  No history of DVT or PE.    Allergies:  Allergies   Allergen Reactions   • Codeine Dizziness   • Morphine And Related Other (See Comments)     Makes goofy and laughs       Meds:  Medications Prior to Admission   Medication Sig Dispense Refill Last Dose   • amLODIPine (NORVASC) 5 MG tablet Take 5 mg by mouth Daily.   2020 at 0600   • aspirin 81 MG EC tablet Take 81 mg by mouth Daily.   2020 at 0430   • Multiple Vitamins-Minerals (CENTRUM ADULTS PO) Take 1 tablet by mouth Daily.   2020 at 1630   • estradiol (CLIMARA) 0.1 MG/24HR patch Place 1 patch on the skin as directed by provider 1 (One) Time Per Week. Currently on left lower abdomen.   2019       History:   Past Medical History:   Diagnosis Date   • Arthritis    • Back pain    • Heart murmur     seen by Dr dias in the past but not necessary to see cardiologist anymore    • Hypertension    • Slow to wake up after anesthesia    • Wears glasses      Past Surgical History:   Procedure Laterality Date   • BLADDER SURGERY     • COLONOSCOPY     • EYE SURGERY      cataract extraction    • HYSTERECTOMY   "   • LUMBAR DISCECTOMY N/A 8/15/2018    Procedure: LUMBAR DISCECTOMY L2-3;  Surgeon: Micah Duarte MD;  Location:  [facility];  Service: Orthopedic Spine   • LUMBAR DISCECTOMY FUSION INSTRUMENTATION N/A 11/15/2017    Procedure: LUMBAR DECOMPRESSION AND FUSION WITH INSTRUMENTATION;  Surgeon: Micah Duarte MD;  Location:  [facility];  Service:    • OOPHORECTOMY Bilateral    • TONSILLECTOMY       Family History   Problem Relation Age of Onset   • Breast cancer Neg Hx    • Ovarian cancer Neg Hx      Social History     Tobacco Use   • Smoking status: Former Smoker     Years: 2.00     Types: Cigarettes     Last attempt to quit:      Years since quittin.0   • Smokeless tobacco: Never Used   Substance Use Topics   • Alcohol use: No   • Drug use: No   She is  with 3 children. She is retired from cleaning service.    Review of Systems  Pertinent items are noted in HPI, all other systems reviewed and negative    Vital Signs  Visit Vitals  /85   Pulse 78   Temp 98.1 °F (36.7 °C)   Resp 16   Ht 154.9 cm (61\")   Wt 61.2 kg (135 lb)   SpO2 95%   BMI 25.51 kg/m²       Physical Exam:    General Appearance:    Alert, cooperative, in no acute distress   Head:    Normocephalic, without obvious abnormality, atraumatic   Eyes:            Lids and lashes normal, conjunctivae and sclerae normal, no   icterus, no pallor, corneas clear   Ears:    Ears appear intact with no abnormalities noted   Neck:   No adenopathy, supple, trachea midline, no thyromegaly   Lungs:     Clear to auscultation, respirations regular, even and                   unlabored    Heart:    Regular rhythm and normal rate, normal S1 and S2, 2/6           murmur   Abdomen:     Normal bowel sounds, mild distention   Genitalia:    Deferred   Extremities:  Right knee Ace wrap clean dry intact.  Nerve block present.   Pulses:   Pulses palpable and equal bilaterally   Skin:   No bleeding, bruising or rash   Neurologic:   Cranial nerves 2 - 12 " grossly intact, sensation intact      I reviewed the patient's new clinical results.     Results for JULIANNE NIETO (MRN [id number]) as of 1/2/2020 11:20   Ref. Range 12/19/2019 12:37   Glucose Latest Ref Range: 65 - 99 mg/dL 166 (H)   Sodium Latest Ref Range: 136 - 145 mmol/L 137   Potassium Latest Ref Range: 3.5 - 5.2 mmol/L 3.9   CO2 Latest Ref Range: 22.0 - 29.0 mmol/L 25.0   Chloride Latest Ref Range: 98 - 107 mmol/L 101   Anion Gap Latest Ref Range: 5.0 - 15.0 mmol/L 11.0   Creatinine Latest Ref Range: 0.57 - 1.00 mg/dL 0.70   BUN Latest Ref Range: 8 - 23 mg/dL 17   BUN/Creatinine Ratio Latest Ref Range: 7.0 - 25.0  24.3   Calcium Latest Ref Range: 8.6 - 10.5 mg/dL 9.3   eGFR Non  Am Latest Ref Range: >60 mL/min/1.73 82   Alkaline Phosphatase Latest Ref Range: 39 - 117 U/L 43   Total Protein Latest Ref Range: 6.0 - 8.5 g/dL 7.2   ALT (SGPT) Latest Ref Range: 1 - 33 U/L 13   AST (SGOT) Latest Ref Range: 1 - 32 U/L 22   Total Bilirubin Latest Ref Range: 0.2 - 1.2 mg/dL 0.4   Albumin Latest Ref Range: 3.50 - 5.20 g/dL 4.70   Globulin Latest Units: gm/dL 2.5   A/G Ratio Latest Units: g/dL 1.9   Hemoglobin A1C Latest Ref Range: 4.80 - 5.60 % 5.50   25 Hydroxy, Vitamin D Latest Ref Range: 30.0 - 100.0 ng/ml 47.9   Protime Latest Ref Range: 11.2 - 14.3 Seconds 12.3   INR Latest Ref Range: 0.85 - 1.16  0.96   PTT Latest Ref Range: 24.0 - 37.0 seconds 33.2   WBC Latest Ref Range: 3.40 - 10.80 10*3/mm3 5.73   RBC Latest Ref Range: 3.77 - 5.28 10*6/mm3 4.56   Hemoglobin Latest Ref Range: 12.0 - 15.9 g/dL 13.2   Hematocrit Latest Ref Range: 34.0 - 46.6 % 38.6   RDW Latest Ref Range: 12.3 - 15.4 % 13.1   MCV Latest Ref Range: 79.0 - 97.0 fL 84.6   MCH Latest Ref Range: 26.6 - 33.0 pg 28.9   MCHC Latest Ref Range: 31.5 - 35.7 g/dL 34.2   MPV Latest Ref Range: 6.0 - 12.0 fL 9.6   Platelets Latest Ref Range: 140 - 450 10*3/mm3 273     Assessment and Plan:     S/P right unicompartmental knee replacement    HTN  (hypertension)    Acute urinary retention    Knee pain      Plan  1. PT/OT- early ambulation postop  2. Pain control-prns, AC nerve block   3. IS-encourage  4. DVT proph- mechs/ASA  5. Bowel regimen  6. Resume home medications as appropriate  7. Monitor post-op labs  8. Discharge planning for home    AUR  Bladder scan, 829 mL straight cath now and PRN.  Will add Flomax.    HTN  - Continue home norvasc  - Monitor BP   - Holding parameters for BP meds  - Labetalol PRN for SBP>170      Pardeep Wolfe MD  01/02/20  5:09 PM

## 2020-01-02 NOTE — ANESTHESIA POSTPROCEDURE EVALUATION
Patient: Sarai Hardy    Procedure Summary     Date:  01/02/20 Room / Location:   [facility]    Anesthesia Start:  0731 Anesthesia Stop:      Procedure:  RIGHT PARTIAL KNEE ARTHROPLASTY (Right Knee) Diagnosis:       Arthritis of right knee      (Arthritis of right knee [[id number]])    Surgeon:  Panda Obando MD Provider:  Kj Anders MD    Anesthesia Type:  spinal, regional ASA Status:  3          Anesthesia Type: spinal, regional    Vitals  Vitals Value Taken Time   /83 1/2/2020 10:00 AM   Temp 97.8 °F (36.6 °C) 1/2/2020  9:48 AM   Pulse 79 1/2/2020 10:03 AM   Resp 16 1/2/2020  9:48 AM   SpO2 96 % 1/2/2020 10:03 AM   Vitals shown include unvalidated device data.        Post Anesthesia Care and Evaluation    Patient location during evaluation: PACU  Patient participation: complete - patient participated  Level of consciousness: awake and alert  Pain score: 0  Pain management: adequate  Airway patency: patent  Anesthetic complications: No anesthetic complications  PONV Status: none  Cardiovascular status: hemodynamically stable and acceptable  Respiratory status: nonlabored ventilation, acceptable and nasal cannula  Hydration status: acceptable

## 2020-01-02 NOTE — PLAN OF CARE
Patient arrived on floor complaining of intense stomach pain 10/10. No incisional pain reported and patient reported having numbness of bilat lower extremities. Patient reported that stomach pain felt better with positional changes. Bladder scanned 900s.In and out cath performed around noon. Patient reports feeling much better. Ambulated in [place] with therapy but still has yet to void on her own. VSS.

## 2020-01-02 NOTE — H&P
Patient Name: Sarai Hardy  MRN: [id number]  : 1945  DOS: 2020    Attending: Panda Obando MD    Primary Care Provider: Pam Reid MD      Chief complaint: Right knee pain    Subjective   Patient is a 74 y.o. female presented for right partial knee arthroplasty by Dr. Obando.    She underwent surgery under spinal anesthesia.  She tolerated surgery well and is admitted for further medical management.  He has been painful for several years.  She denies use of assistive device for ambulation or recent falls.     She is known to us from several previous admissions most recently 2018 for lumbar discectomy; which she recovered well.     When seen postop she complains of severe lower abdominal pain.  She denies knee pain, but is still under effects of spinal anesthesia.  She denies nausea, shortness of breath or chest pain.  No history of DVT or PE.    Allergies:  Allergies   Allergen Reactions   • Codeine Dizziness   • Morphine And Related Other (See Comments)     Makes goofy and laughs       Meds:  Medications Prior to Admission   Medication Sig Dispense Refill Last Dose   • amLODIPine (NORVASC) 5 MG tablet Take 5 mg by mouth Daily.   2020 at 0600   • aspirin 81 MG EC tablet Take 81 mg by mouth Daily.   2020 at 0430   • Multiple Vitamins-Minerals (CENTRUM ADULTS PO) Take 1 tablet by mouth Daily.   2020 at 1630   • estradiol (CLIMARA) 0.1 MG/24HR patch Place 1 patch on the skin as directed by provider 1 (One) Time Per Week. Currently on left lower abdomen.   2019       History:   Past Medical History:   Diagnosis Date   • Arthritis    • Back pain    • Heart murmur     seen by Dr dias in the past but not necessary to see cardiologist anymore    • Hypertension    • Slow to wake up after anesthesia    • Wears glasses      Past Surgical History:   Procedure Laterality Date   • BLADDER SURGERY     • COLONOSCOPY     • EYE SURGERY      cataract extraction    • HYSTERECTOMY   "   • LUMBAR DISCECTOMY N/A 8/15/2018    Procedure: LUMBAR DISCECTOMY L2-3;  Surgeon: Micah Duarte MD;  Location:  [facility];  Service: Orthopedic Spine   • LUMBAR DISCECTOMY FUSION INSTRUMENTATION N/A 11/15/2017    Procedure: LUMBAR DECOMPRESSION AND FUSION WITH INSTRUMENTATION;  Surgeon: Micah Duarte MD;  Location:  [facility];  Service:    • OOPHORECTOMY Bilateral    • TONSILLECTOMY       Family History   Problem Relation Age of Onset   • Breast cancer Neg Hx    • Ovarian cancer Neg Hx      Social History     Tobacco Use   • Smoking status: Former Smoker     Years: 2.00     Types: Cigarettes     Last attempt to quit:      Years since quittin.0   • Smokeless tobacco: Never Used   Substance Use Topics   • Alcohol use: No   • Drug use: No   She is  with 3 children. She is retired from cleaning service.    Review of Systems  Pertinent items are noted in HPI, all other systems reviewed and negative    Vital Signs  Visit Vitals  /85   Pulse 78   Temp 98.1 °F (36.7 °C)   Resp 16   Ht 154.9 cm (61\")   Wt 61.2 kg (135 lb)   SpO2 95%   BMI 25.51 kg/m²       Physical Exam:    General Appearance:    Alert, cooperative, in no acute distress   Head:    Normocephalic, without obvious abnormality, atraumatic   Eyes:            Lids and lashes normal, conjunctivae and sclerae normal, no   icterus, no pallor, corneas clear   Ears:    Ears appear intact with no abnormalities noted   Neck:   No adenopathy, supple, trachea midline, no thyromegaly   Lungs:     Clear to auscultation, respirations regular, even and                   unlabored    Heart:    Regular rhythm and normal rate, normal S1 and S2, 2/6           murmur   Abdomen:     Normal bowel sounds, mild distention   Genitalia:    Deferred   Extremities:  Right knee Ace wrap clean dry intact.  Nerve block present.   Pulses:   Pulses palpable and equal bilaterally   Skin:   No bleeding, bruising or rash   Neurologic:   Cranial nerves 2 - 12 " grossly intact, sensation intact      I reviewed the patient's new clinical results.     Results for JULIANNE NIETO (MRN [id number]) as of 1/2/2020 11:20   Ref. Range 12/19/2019 12:37   Glucose Latest Ref Range: 65 - 99 mg/dL 166 (H)   Sodium Latest Ref Range: 136 - 145 mmol/L 137   Potassium Latest Ref Range: 3.5 - 5.2 mmol/L 3.9   CO2 Latest Ref Range: 22.0 - 29.0 mmol/L 25.0   Chloride Latest Ref Range: 98 - 107 mmol/L 101   Anion Gap Latest Ref Range: 5.0 - 15.0 mmol/L 11.0   Creatinine Latest Ref Range: 0.57 - 1.00 mg/dL 0.70   BUN Latest Ref Range: 8 - 23 mg/dL 17   BUN/Creatinine Ratio Latest Ref Range: 7.0 - 25.0  24.3   Calcium Latest Ref Range: 8.6 - 10.5 mg/dL 9.3   eGFR Non  Am Latest Ref Range: >60 mL/min/1.73 82   Alkaline Phosphatase Latest Ref Range: 39 - 117 U/L 43   Total Protein Latest Ref Range: 6.0 - 8.5 g/dL 7.2   ALT (SGPT) Latest Ref Range: 1 - 33 U/L 13   AST (SGOT) Latest Ref Range: 1 - 32 U/L 22   Total Bilirubin Latest Ref Range: 0.2 - 1.2 mg/dL 0.4   Albumin Latest Ref Range: 3.50 - 5.20 g/dL 4.70   Globulin Latest Units: gm/dL 2.5   A/G Ratio Latest Units: g/dL 1.9   Hemoglobin A1C Latest Ref Range: 4.80 - 5.60 % 5.50   25 Hydroxy, Vitamin D Latest Ref Range: 30.0 - 100.0 ng/ml 47.9   Protime Latest Ref Range: 11.2 - 14.3 Seconds 12.3   INR Latest Ref Range: 0.85 - 1.16  0.96   PTT Latest Ref Range: 24.0 - 37.0 seconds 33.2   WBC Latest Ref Range: 3.40 - 10.80 10*3/mm3 5.73   RBC Latest Ref Range: 3.77 - 5.28 10*6/mm3 4.56   Hemoglobin Latest Ref Range: 12.0 - 15.9 g/dL 13.2   Hematocrit Latest Ref Range: 34.0 - 46.6 % 38.6   RDW Latest Ref Range: 12.3 - 15.4 % 13.1   MCV Latest Ref Range: 79.0 - 97.0 fL 84.6   MCH Latest Ref Range: 26.6 - 33.0 pg 28.9   MCHC Latest Ref Range: 31.5 - 35.7 g/dL 34.2   MPV Latest Ref Range: 6.0 - 12.0 fL 9.6   Platelets Latest Ref Range: 140 - 450 10*3/mm3 273     Assessment and Plan:     S/P right unicompartmental knee replacement    HTN  (hypertension)    Acute urinary retention      Plan  1. PT/OT- early ambulation postop  2. Pain control-prns, AC nerve block   3. IS-encourage  4. DVT proph- mechs/ASA  5. Bowel regimen  6. Resume home medications as appropriate  7. Monitor post-op labs  8. Discharge planning for home    AUR  Bladder scan, 829 mL straight cath now and PRN.  Will add Flomax.    HTN  - Continue home norvasc  - Monitor BP   - Holding parameters for BP meds  - Labetalol PRN for SBP>170      KARRIE Stephenson  01/02/20  11:33 AM

## 2020-01-03 VITALS
HEIGHT: 61 IN | OXYGEN SATURATION: 95 % | RESPIRATION RATE: 16 BRPM | TEMPERATURE: 98.1 F | WEIGHT: 135 LBS | DIASTOLIC BLOOD PRESSURE: 66 MMHG | BODY MASS INDEX: 25.49 KG/M2 | HEART RATE: 81 BPM | SYSTOLIC BLOOD PRESSURE: 145 MMHG

## 2020-01-03 LAB
ANION GAP SERPL CALCULATED.3IONS-SCNC: 10 MMOL/L (ref 5–15)
BUN BLD-MCNC: 17 MG/DL (ref 8–23)
BUN/CREAT SERPL: 23 (ref 7–25)
CALCIUM SPEC-SCNC: 8.7 MG/DL (ref 8.6–10.5)
CHLORIDE SERPL-SCNC: 104 MMOL/L (ref 98–107)
CO2 SERPL-SCNC: 22 MMOL/L (ref 22–29)
CREAT BLD-MCNC: 0.74 MG/DL (ref 0.57–1)
DEPRECATED RDW RBC AUTO: 41.9 FL (ref 37–54)
ERYTHROCYTE [DISTWIDTH] IN BLOOD BY AUTOMATED COUNT: 13 % (ref 12.3–15.4)
GFR SERPL CREATININE-BSD FRML MDRD: 77 ML/MIN/1.73
GLUCOSE BLD-MCNC: 125 MG/DL (ref 65–99)
HCT VFR BLD AUTO: 34.7 % (ref 34–46.6)
HGB BLD-MCNC: 11.5 G/DL (ref 12–15.9)
MCH RBC QN AUTO: 29.3 PG (ref 26.6–33)
MCHC RBC AUTO-ENTMCNC: 33.1 G/DL (ref 31.5–35.7)
MCV RBC AUTO: 88.5 FL (ref 79–97)
PLATELET # BLD AUTO: 241 10*3/MM3 (ref 140–450)
PMV BLD AUTO: 10.3 FL (ref 6–12)
POTASSIUM BLD-SCNC: 3.9 MMOL/L (ref 3.5–5.2)
RBC # BLD AUTO: 3.92 10*6/MM3 (ref 3.77–5.28)
SODIUM BLD-SCNC: 136 MMOL/L (ref 136–145)
WBC NRBC COR # BLD: 18.77 10*3/MM3 (ref 3.4–10.8)

## 2020-01-03 PROCEDURE — A9270 NON-COVERED ITEM OR SERVICE: HCPCS | Performed by: NURSE PRACTITIONER

## 2020-01-03 PROCEDURE — 80048 BASIC METABOLIC PNL TOTAL CA: CPT | Performed by: ORTHOPAEDIC SURGERY

## 2020-01-03 PROCEDURE — 63710000001 AMLODIPINE 5 MG TABLET: Performed by: NURSE PRACTITIONER

## 2020-01-03 PROCEDURE — 63710000001 MELOXICAM 7.5 MG TABLET: Performed by: ORTHOPAEDIC SURGERY

## 2020-01-03 PROCEDURE — 94799 UNLISTED PULMONARY SVC/PX: CPT

## 2020-01-03 PROCEDURE — A9270 NON-COVERED ITEM OR SERVICE: HCPCS | Performed by: ORTHOPAEDIC SURGERY

## 2020-01-03 PROCEDURE — 63710000001 OXYCODONE 5 MG TABLET: Performed by: ORTHOPAEDIC SURGERY

## 2020-01-03 PROCEDURE — 97535 SELF CARE MNGMENT TRAINING: CPT

## 2020-01-03 PROCEDURE — 63710000001 TAMSULOSIN 0.4 MG CAPSULE: Performed by: NURSE PRACTITIONER

## 2020-01-03 PROCEDURE — 63710000001 ASPIRIN 81 MG TABLET DELAYED-RELEASE: Performed by: ORTHOPAEDIC SURGERY

## 2020-01-03 PROCEDURE — 97166 OT EVAL MOD COMPLEX 45 MIN: CPT

## 2020-01-03 PROCEDURE — 97116 GAIT TRAINING THERAPY: CPT | Performed by: PHYSICAL THERAPIST

## 2020-01-03 PROCEDURE — 63710000001 ACETAMINOPHEN 500 MG TABLET: Performed by: ORTHOPAEDIC SURGERY

## 2020-01-03 PROCEDURE — 97110 THERAPEUTIC EXERCISES: CPT | Performed by: PHYSICAL THERAPIST

## 2020-01-03 PROCEDURE — 85027 COMPLETE CBC AUTOMATED: CPT | Performed by: NURSE PRACTITIONER

## 2020-01-03 RX ORDER — ASPIRIN 81 MG/1
81 TABLET ORAL DAILY
Start: 2020-01-03

## 2020-01-03 RX ORDER — PSEUDOEPHEDRINE HCL 30 MG
100 TABLET ORAL 2 TIMES DAILY PRN
Start: 2020-01-03

## 2020-01-03 RX ORDER — ASPIRIN 81 MG/1
81 TABLET ORAL EVERY 12 HOURS SCHEDULED
Start: 2020-01-03

## 2020-01-03 RX ORDER — HYDROCODONE BITARTRATE AND ACETAMINOPHEN 7.5; 325 MG/1; MG/1
1 TABLET ORAL EVERY 6 HOURS PRN
Start: 2020-01-03

## 2020-01-03 RX ORDER — IPRATROPIUM BROMIDE AND ALBUTEROL SULFATE 2.5; .5 MG/3ML; MG/3ML
3 SOLUTION RESPIRATORY (INHALATION) ONCE
Status: DISCONTINUED | OUTPATIENT
Start: 2020-01-03 | End: 2020-01-03 | Stop reason: HOSPADM

## 2020-01-03 RX ADMIN — AMLODIPINE BESYLATE 5 MG: 5 TABLET ORAL at 09:45

## 2020-01-03 RX ADMIN — ACETAMINOPHEN 1000 MG: 500 TABLET, FILM COATED ORAL at 11:20

## 2020-01-03 RX ADMIN — TAMSULOSIN HYDROCHLORIDE 0.4 MG: 0.4 CAPSULE ORAL at 09:46

## 2020-01-03 RX ADMIN — MELOXICAM 15 MG: 7.5 TABLET ORAL at 12:50

## 2020-01-03 RX ADMIN — ASPIRIN 81 MG: 81 TABLET, COATED ORAL at 09:46

## 2020-01-03 RX ADMIN — OXYCODONE HYDROCHLORIDE 5 MG: 5 TABLET ORAL at 11:31

## 2020-01-03 RX ADMIN — ACETAMINOPHEN 1000 MG: 500 TABLET, FILM COATED ORAL at 03:55

## 2020-01-03 NOTE — PROGRESS NOTES
[facility]    Acute pain service Inpatient Progress Note    Patient Name: Sarai Hardy  :  1945  MRN:  [id number]      Pt had some reported decreased oxygenation postop, on 02 now, so soa, symmetric chest rise, breath sounds little decreased RLL.  Pt. With ? Allergy/reactive airway, nonsmoker, so plan for reduced local anesthetic should improve lung expansion and oxygenation.  Would suggest Resp Nebs prn thank you  Acute Pain  Service Inpatient Progress Note:    Analgesia:Excellent  LOC: alert and awake  Resp Status: room air  Cardiac: VS stable  Side Effects:None  Catheter Site:clean, dressing intact and dry  Cath type: peripheral nerve cath with ON Q  Infusion rate: 4ml/hr  Catheter Plan:Catheter to remain Insitu and Continue catheter infusion rate unchanged  Comments: ---------------------------------------------------------------------------------  Physical Therapy Manual Muscle Testing Results:  MMT (Manual Muscle Testing)  General MMT Comments: BUE intact for ADL (20 [id number])]    Physical Therapy - Plan of Care Review - Outcome Summary:  Outcome Summary: Pt amb 400' with RW SBA with step through gait mechanics. Pt limited by fatigue. Progressed patient's HEP and patient tolerated well - provided handout. Pt's knee AROM -3-102 degrees. Recommend patient home with assist and home health PT.  (20 [id number])]    Occupational Therapy - Plan of Care Review - Outcome Summary:  Outcome Summary: OT IE completed. No POC established as pt anticipated to d/c home today following PT. CGA in-room mobility and transfers via RW; cues for RW positioning and safety. Education completed for ARROW/surgical precautions and use of AE. Pt requests Ortho AE Kit - CM notified. Pt requests shower seat for home; education completed for shower seat vs TTB - CM to follow up. OT will d/c at this time. (20  [id number])]  ----------------------------------------------------------------------------------

## 2020-01-03 NOTE — PROGRESS NOTES
Discharge Planning Assessment  [facility]     Patient Name: Sarai Hardy  MRN: [id number]  Today's Date: 1/3/2020    Admit Date: 1/2/2020    Discharge Needs Assessment     Row Name 01/03/20 1325       Living Environment    Lives With  spouse    Name(s) of Who Lives With Patient  John Hardy ( spouse)    Current Living Arrangements  home/apartment/condo    Primary Care Provided by  self    Provides Primary Care For  no one    Family Caregiver if Needed  spouse    Family Caregiver Names  John ( spouse)    Quality of Family Relationships  helpful;involved    Able to Return to Prior Arrangements  yes       Resource/Environmental Concerns    Resource/Environmental Concerns  none    Transportation Concerns  car, none       Transition Planning    Patient/Family Anticipated Services at Transition  outpatient care    Transportation Anticipated  family or friend will provide       Discharge Needs Assessment    Readmission Within the Last 30 Days  no previous admission in last 30 days    Concerns to be Addressed  basic needs;discharge planning    Equipment Currently Used at Home  commode;walker, rolling    Anticipated Changes Related to Illness  inability to care for self    Equipment Needed After Discharge  other (see comments) plans to purchase a tub bench, options provided on  where to purchase    Discharge Facility/Level of Care Needs  outpatient therapy    Provided post acute provider list?  -- n/a. already arranged by MD office    Current Discharge Risk  dependent with mobility/activities of daily living        Discharge Plan     Row Name 01/03/20 1329       Plan    Plan  Home    Plan Comments  I met with Mrs Hardy to discuss d/c plan. Her plan is to return home.  She lives with  who can assist as needed. she already has a rolling walkedr & BSC, she would like to purchase tub bench. Options provided for  on whre to purchase. She has pre admission arrangements by MD office for outpt PT with [facility]. They will contact her at home for a home visit, prior to outpt PT. No other d/c needs identified.    Final Discharge Disposition Code  01 - home or self-care        Destination      Coordination has not been started for this encounter.      Durable Medical Equipment      Coordination has not been started for this encounter.      Dialysis/Infusion      Coordination has not been started for this encounter.      Home Medical Care      Coordination has not been started for this encounter.      Therapy      Coordination has not been started for this encounter.      Community Resources      Coordination has not been started for this encounter.        Expected Discharge Date and Time     Expected Discharge Date Expected Discharge Time    Hema 3, 2020         Demographic Summary     Row Name 01/03/20 1324       General Information    Admission Type  observation    Arrived From  home    Referral Source  physician    Reason for Consult  discharge planning    Preferred Language  English    General Information Comments  PCP- Pam Miller       Contact Information    Permission Granted to Share Info With  ;family/designee        Functional Status     Row Name 01/03/20 1325       Functional Status    Usual Activity Tolerance  moderate    Current Activity Tolerance  -- see PT notes       Functional Status, IADL    Medications  independent    Meal Preparation  independent    Housekeeping  independent    Laundry  independent    Shopping  independent       Mental Status    General Appearance WDL  WDL       Mental Status Summary    Recent Changes in Mental Status/Cognitive Functioning  no changes       Employment/    Employment Status  retired    Employment/ Comments  insurance- Medicare & [employer]        Psychosocial    No documentation.       Abuse/Neglect    No documentation.       Legal    No documentation.       Substance Abuse    No documentation.       Patient Forms    No documentation.            Sonja C Kellerman, RN

## 2020-01-03 NOTE — PROGRESS NOTES
[facility]    Acute pain service Inpatient Progress Note    Patient Name: Sarai Hardy  :  1945  MRN:  [id number]        Acute Pain  Service Inpatient Progress Note:    Analgesia:Good  LOC: alert and awake  Resp Status: room air  Cardiac: VS stable  Side Effects:None  Catheter Site:clean, dressing intact and dry  Cath type: peripheral nerve cath with ON Q  Infusion rate: 10ml/hr  Catheter Plan:Catheter to remain Insitu and Continue catheter infusion rate unchanged  Comments: ---------------------------------------------------------------------------------  Physical Therapy Manual Muscle Testing Results:  MMT (Manual Muscle Testing)  General MMT Comments: BUE intact for ADL (20)]    Physical Therapy - Plan of Care Review - Outcome Summary:  Outcome Summary: Pt amb 400' with RW SBA with step through gait mechanics. Pt limited by fatigue. Progressed patient's HEP and patient tolerated well - provided handout. Pt's knee AROM -3-102 degrees. Recommend patient home with assist and home health PT.  (20 0920)]    Occupational Therapy - Plan of Care Review - Outcome Summary:  Outcome Summary: OT IE completed. No POC established as pt anticipated to d/c home today following PT. CGA in-room mobility and transfers via RW; cues for RW positioning and safety. Education completed for ARROW/surgical precautions and use of AE. Pt requests Ortho AE Kit - CM notified. Pt requests shower seat for home; education completed for shower seat vs TTB - CM to follow up. OT will d/c at this time. (2011)]  ----------------------------------------------------------------------------------

## 2020-01-03 NOTE — THERAPY DISCHARGE NOTE
Acute Care - Occupational Therapy Initial Eval/Discharge  [facility]     Patient Name: Sarai Hardy  : 1945  MRN: [id number]  Today's Date: 1/3/2020  Onset of Illness/Injury or Date of Surgery: 20  Date of Referral to OT: 20  Referring Physician: Topher      Admit Date: 2020       ICD-10-CM ICD-9-CM   1. Impaired mobility and ADLs Z74.09 799.89     Patient Active Problem List   Diagnosis   • Back pain   • S/P lumbar fusion   • HTN (hypertension)   • GERD (gastroesophageal reflux disease)   • Leukocytosis, likely reactive   • Acute blood loss anemia, mild, asymptomatic   • S/P lumbar discectomy   • Acute postoperative pain   • Primary osteoarthritis of right knee   • S/P right unicompartmental knee replacement   • Acute urinary retention   • Knee pain     Past Medical History:   Diagnosis Date   • Arthritis    • Back pain    • Heart murmur     seen by Dr dias in the past but not necessary to see cardiologist anymore    • Hypertension    • Slow to wake up after anesthesia    • Wears glasses      Past Surgical History:   Procedure Laterality Date   • BLADDER SURGERY     • COLONOSCOPY     • EYE SURGERY      cataract extraction    • HYSTERECTOMY     • LUMBAR DISCECTOMY N/A 8/15/2018    Procedure: LUMBAR DISCECTOMY L2-3;  Surgeon: Micah Duarte MD;  Location: [facility];  Service: Orthopedic Spine   • LUMBAR DISCECTOMY FUSION INSTRUMENTATION N/A 11/15/2017    Procedure: LUMBAR DECOMPRESSION AND FUSION WITH INSTRUMENTATION;  Surgeon: Micah Duarte MD;  Location: [facility];  Service:    • OOPHORECTOMY Bilateral    • TONSILLECTOMY            OT ASSESSMENT FLOWSHEET (last 12 hours)      Occupational Therapy Evaluation     Row Name 20 0851                   OT Evaluation Time/Intention    Subjective Information  no complaints  -TB        Document Type  evaluation;discharge evaluation/summary  -TB        Mode of Treatment  occupational therapy;individual therapy  -TB         "Patient Effort  good  -TB        Symptoms Noted During/After Treatment  none  -TB           General Information    Patient Profile Reviewed?  yes  -TB        Onset of Illness/Injury or Date of Surgery  01/02/20  -TB        Referring Physician  Denehy  -TB        Prior Level of Function  min assist:;ADL's;dressing;bathing  -TB        Equipment Currently Used at Home  grab bar;walker, rolling grab bar at tub; no using AD prior  -TB        Existing Precautions/Restrictions  fall;other (see comments) adductor canal block  -TB        Limitations/Impairments  safety/cognitive  -TB        Barriers to Rehab  previous functional deficit  -TB           Relationship/Environment    Primary Source of Support/Comfort  spouse  -TB        Lives With  spouse  -TB        Family Caregiver if Needed  spouse  -TB        Concerns About Impact on Relationships  Pt's spouse is 92 years old and in \"poor shape\" per pt; able to assist with \"little things\"  -TB           Resource/Environmental Concerns    Current Living Arrangements  home/apartment/condo  -TB           Home Main Entrance    Number of Stairs, Main Entrance  none  -TB           Stairs Within Home, Primary    Number of Stairs, Within Home, Primary  none  -TB           Cognitive Assessment/Intervention- PT/OT    Orientation Status (Cognition)  oriented x 4  -TB        Follows Commands (Cognition)  over 90% accuracy;verbal cues/prompting required  -TB        Safety Deficit (Cognitive)  awareness of need for assistance;insight into deficits/self awareness;safety precautions awareness;safety precautions follow-through/compliance  -TB           Safety Issues, Functional Mobility    Safety Issues Affecting Function (Mobility)  awareness of need for assistance;insight into deficits/self awareness;safety precaution awareness;safety precautions follow-through/compliance  -TB        Impairments Affecting Function (Mobility)  pain;range of motion (ROM);strength  -TB           Mobility " Assessment/Treatment    Extremity Weight-bearing Status  right lower extremity  -TB        Right Lower Extremity (Weight-bearing Status)  weight-bearing as tolerated (WBAT)  -TB           Bed Mobility Assessment/Treatment    Comment (Bed Mobility)  Pt sitting EOB for meal. Leg  issued and education/demonstration completed  -TB           Functional Mobility    Functional Mobility- Ind. Level  contact guard assist;verbal cues required  -TB        Functional Mobility- Device  rolling walker  -TB        Functional Mobility- Safety Issues  step length decreased;weight-shifting ability decreased;balance decreased during turns  -TB        Functional Mobility- Comment  Education completed for RW positioning and safety  -TB           Transfer Assessment/Treatment    Transfer Assessment/Treatment  sit-stand transfer;stand-sit transfer;bed-chair transfer  -TB        Comment (Transfers)  cues for hand placement, sequencing and safety  -TB           Bed-Chair Transfer    Bed-Chair [place] (Transfers)  contact guard;verbal cues  -TB        Assistive Device (Bed-Chair Transfers)  walker, front-wheeled  -TB           Sit-Stand Transfer    Sit-Stand [place] (Transfers)  contact guard;verbal cues  -TB        Assistive Device (Sit-Stand Transfers)  walker, front-wheeled  -TB           Stand-Sit Transfer    Stand-Sit [place] (Transfers)  contact guard;verbal cues  -TB        Assistive Device (Stand-Sit Transfers)  walker, front-wheeled  -TB           ADL Assessment/Intervention    91478 - OT Self Care/Mgmt Minutes  20  -TB        BADL Assessment/Intervention  bathing;lower body dressing;feeding  -TB           Bathing Assessment/Intervention    Bathing [place] Level  lower body;bathing skills  -TB        Assistive Devices (Bathing)  long-handled sponge  -TB        Bathing Position  unsupported sitting  -TB        Comment (Bathing)  Education completed for precautions and use of AE to maximize ADL independence;  no showers until ARROW d/c'd  -TB           Lower Body Dressing Assessment/Training    Lower Body Dressing [place] Level  doff;don;socks;pants/bottoms;shoes/slippers  -TB        Assistive Devices (Lower Body Dressing)  reacher;sock-aid;long-handled shoe horn  -TB        Lower Body Dressing Position  unsupported sitting  -TB        Comment (Lower Body Dressing)  Education completed for precautions and use of AE; good teach back  -TB           Self-Feeding Assessment/Training    [place] Level (Feeding)  liquids to mouth;scoop food and bring to mouth;independent  -TB        Position (Self-Feeding)  edge of bed sitting  -TB           BADL Safety/Performance    Impairments, BADL Safety/Performance  pain;range of motion;strength;balance  -TB        Skilled BADL Treatment/Intervention  BADL process/adaptation training;adaptive equipment training  -TB           General ROM    GENERAL ROM COMMENTS  AROM BUE WFL for ADL  -TB           MMT (Manual Muscle Testing)    General MMT Comments  BUE intact for ADL  -TB           Motor Assessment/Interventions    Additional Documentation  Balance (Group);Therapeutic Exercise (Group)  -TB           Therapeutic Exercise    Therapeutic Exercise  seated, upper extremities  -TB        Additional Documentation  Therapeutic Exercise (Row)  -TB           Upper Extremity Seated Therapeutic Exercise    Performed, Seated Upper Extremity (Therapeutic Exercise)  shoulder flexion/extension;shoulder abduction/adduction;shoulder external/internal rotation;shoulder horizontal abduction/adduction;elbow flexion/extension;wrist flexion/extension;digit flexion/extension  -TB        Exercise Type, Seated Upper Extremity (Therapeutic Exercise)  AROM (active range of motion)  -TB        Restrictions, Seated Upper Extremity (Therapeutic Exercise)  Pt uses BUE freely to complete ADL tasks  -TB           Balance    Balance  dynamic sitting balance;dynamic standing balance  -TB           Dynamic Sitting  Balance    Level of [place], Reaches Outside Midline (Sitting, Dynamic Balance)  supervision  -TB        Sitting Position, Reaches Outside Midline (Sitting, Dynamic Balance)  sitting in chair;sitting on edge of bed  -TB        Comment, Reaches Outside Midline (Sitting, Dynamic Balance)  ADL tasks/AE teaching  -TB           Dynamic Standing Balance    Level of [place], Reaches Outside Midline (Standing, Dynamic Balance)  contact guard assist  -TB        Time Able to Maintain Position, Reaches Outside Midline (Standing, Dynamic Balance)  1 to 2 minutes  -TB        Assistive Device Utilized (Supported Standing, Dynamic Balance)  walker, rolling  -TB           Sensory Assessment/Intervention    Sensory General Assessment  no sensation deficits identified BUE intact  -TB           Positioning and Restraints    Pre-Treatment Position  in bed  -TB        Post Treatment Position  chair  -TB        In Chair  notified nsg;reclined;call light within reach;encouraged to call for assist;exit alarm on;legs elevated lines intact  -TB           Pain Scale: Numbers Pre/Post-Treatment    Pain Scale: Numbers, Pretreatment  0/10 - no pain  -TB        Pain Scale: Numbers, Post-Treatment  0/10 - no pain  -TB        Pain Location - Side  Right  -TB        Pain Location  knee  -TB        Pain Intervention(s)  Ambulation/increased activity;Repositioned;Medication (See MAR) adductor canal block  -TB           Wound 01/02/20 Right anterior knee Incision    Wound - Properties Group Date first assessed: 01/02/20  -SS Side: Right  -SS Orientation: anterior  -SS Location: knee  -SS Primary Wound Type: Incision  -SS Additional Comments: Skin affix, steris, optifoam, softroll, ace  -SS       Coping    Observed Emotional State  calm;cooperative  -TB        Verbalized Emotional State  acceptance  -TB           Plan of Care Review    Plan of Care Reviewed With  patient  -TB           Clinical Impression (OT)    Date of Referral to OT   01/02/20  -TB        OT Diagnosis  Impaired mobility and ADL  -TB        Patient/Family Goals Statement (OT Eval)  to return home today with spouse assist; requests HHPT, shower chair and ortho AE Kit - CM notified  -TB        Therapy Frequency (OT Eval)  evaluation only Anticipate d/c to home today following PT  -TB        Care Plan Review (OT)  evaluation/treatment results reviewed;risks/benefits reviewed;patient/other agree to care plan  -TB        Anticipated Equipment Needs at Discharge (OT)  shower chair Ortho AE kit  -TB        Patient/Family Concerns, Equipment Needs at Discharge (OT)  small shower chair with back vs TTB (CM to clarify)  -TB        Anticipated Discharge Disposition (OT)  home with assist;home with home health  -TB           Vital Signs    Pre Systolic BP Rehab  -- RN cleared OT  -TB        Pre Patient Position  Sitting  -TB        Intra Patient Position  Standing  -TB        Post Patient Position  Sitting  -TB           Discharge Summary (Occupational Therapy)    Additional Documentation  Discharge Summary, OT Eval (Group)  -TB           Discharge Summary, OT Eval    Reason for Discharge (OT Discharge Summary)  patient discharged from this facility Anticipate d/c to home today following PT  -TB           Living Environment    Home Accessibility  wheelchair accessible;tub/shower is not walk in Education completed for DME options  -TB          User Key  (r) = Recorded By, (t) = Taken By, (c) = Cosigned By    Initials Name Effective Dates    TB Kami Valenzuela, OT 06/08/18 -     Ja Ocasio RN 01/15/18 -               OT Recommendation and Plan  Outcome Summary/Treatment Plan (OT)  Anticipated Equipment Needs at Discharge (OT): shower chair(Ortho AE kit)  Patient/Family Concerns, Equipment Needs at Discharge (OT): small shower chair with back vs TTB (CM to clarify)  Anticipated Discharge Disposition (OT): home with assist, home with home health  Reason for Discharge (OT  Discharge Summary): patient discharged from this facility(Anticipate d/c to home today following PT)  Therapy Frequency (OT Eval): evaluation only(Anticipate d/c to home today following PT)  Plan of Care Review  Plan of Care Reviewed With: patient  Plan of Care Reviewed With: patient  Outcome Summary: OT IE completed. No POC established as pt anticipated to d/c home today following PT. CGA in-room mobility and transfers via RW; cues for RW positioning and safety. Education completed for ARROW/surgical precautions and use of AE. Pt requests Ortho AE Kit - CM notified. Pt requests shower seat for home; education completed for shower seat vs TTB - CM to follow up. OT will d/c at this time.         Outcome Measures     Row Name 01/03/20 0851             How much help from another is currently needed...    Putting on and taking off regular lower body clothing?  2  -TB      Bathing (including washing, rinsing, and drying)  3  -TB      Toileting (which includes using toilet bed pan or urinal)  3  -TB      Putting on and taking off regular upper body clothing  3  -TB      Taking care of personal grooming (such as brushing teeth)  3  -TB      Eating meals  4  -TB      AM-PAC 6 Clicks Score (OT)  18  -TB         Functional Assessment    Outcome Measure Options  AM-PAC 6 Clicks Daily Activity (OT)  -TB        User Key  (r) = Recorded By, (t) = Taken By, (c) = Cosigned By    Initials Name Provider Type    [place] OT Occupational Therapist          Time Calculation:   Time Calculation- OT     Row Name 01/03/20 0945 01/03/20 0851          Time Calculation- OT    OT Start Time  0851  -TB  --     OT Received On  01/03/20  -TB  --        Timed Charges    67854 - OT Self Care/Mgmt Minutes  --  20  -TB       User Key  (r) = Recorded By, (t) = Taken By, (c) = Cosigned By    Initials Name Provider Type    [place] OT Occupational Therapist        Therapy Suggested Charges     Code   Minutes Charges     94484 (CPT®) Hc Ot Neuromusc Re Education Ea 15 Min      74421 (CPT®) Hc Ot Ther Proc Ea 15 Min      32974 (CPT®) Hc Ot Therapeutic Act Ea 15 Min      30493 (CPT®) Hc Ot Manual Therapy Ea 15 Min      20297 (CPT®) Hc Ot Iontophoresis Ea 15 Min      84971 (CPT®) Hc Ot Elec Stim Ea-Per 15 Min      66096 (CPT®) Hc Ot Ultrasound Ea 15 Min      65790 (CPT®) Hc Ot Self Care/Mgmt/Train Ea 15 Min 20 1    Total  20 1        Therapy Charges for Today     Code Description Service Date Service Provider Modifiers Qty    54128601896 HC OT SELF CARE/MGMT/TRAIN EA 15 MIN 1/3/2020 Kami Valenzuela OT GO 1    03711717902 HC OT EVAL MOD COMPLEXITY 3 1/3/2020 Kami Valenzuela OT GO 1               OT Discharge Summary  Anticipated Discharge Disposition (OT): home with assist, home with home health    Kami Valenzuela OT  1/3/2020

## 2020-01-03 NOTE — PLAN OF CARE
Problem: Patient Care Overview  Goal: Plan of Care Review  Flowsheets (Taken 1/3/2020 5733)  Outcome Summary: OT IE completed. No POC established as pt anticipated to d/c home today following PT. CGA in-room mobility and transfers via RW; cues for RW positioning and safety. Education completed for ARROW/surgical precautions and use of AE. Pt requests Ortho AE Kit - CM notified. Pt requests shower seat for home; education completed for shower seat vs TTB - CM to follow up. OT will d/c at this time.

## 2020-01-03 NOTE — THERAPY TREATMENT NOTE
Patient Name: Sarai Hardy  : 1945    MRN: [id number]                              Today's Date: 1/3/2020       Admit Date: 2020    Visit Dx:     ICD-10-CM ICD-9-CM   1. Impaired mobility and ADLs Z74.09 799.89     Patient Active Problem List   Diagnosis   • Back pain   • S/P lumbar fusion   • HTN (hypertension)   • GERD (gastroesophageal reflux disease)   • Leukocytosis, likely reactive   • Acute blood loss anemia, mild, asymptomatic   • S/P lumbar discectomy   • Acute postoperative pain   • Primary osteoarthritis of right knee   • S/P right unicompartmental knee replacement   • Acute urinary retention   • Knee pain     Past Medical History:   Diagnosis Date   • Arthritis    • Back pain    • Heart murmur     seen by Dr dias in the past but not necessary to see cardiologist anymore    • Hypertension    • Slow to wake up after anesthesia    • Wears glasses      Past Surgical History:   Procedure Laterality Date   • BLADDER SURGERY     • COLONOSCOPY     • EYE SURGERY      cataract extraction    • HYSTERECTOMY     • LUMBAR DISCECTOMY N/A 8/15/2018    Procedure: LUMBAR DISCECTOMY L2-3;  Surgeon: Micah Duarte MD;  Location:  [facility];  Service: Orthopedic Spine   • LUMBAR DISCECTOMY FUSION INSTRUMENTATION N/A 11/15/2017    Procedure: LUMBAR DECOMPRESSION AND FUSION WITH INSTRUMENTATION;  Surgeon: Micah Duarte MD;  Location:  [facility];  Service:    • OOPHORECTOMY Bilateral    • TONSILLECTOMY       General Information     Row Name 20          PT Evaluation Time/Intention    Document Type  therapy note (daily note)  -CS     Mode of Treatment  individual therapy;physical therapy  -CS     Row Name 20          General Information    Patient Profile Reviewed?  yes  -CS     Existing Precautions/Restrictions  fall;other (see comments) R LE adductor canal catheter  -CS     Row Name 20          Cognitive Assessment/Intervention- PT/OT    Orientation Status  (Cognition)  oriented x 4  -CS     Row Name 01/03/20 0920          Safety Issues, Functional Mobility    Safety Issues Affecting Function (Mobility)  safety precautions follow-through/compliance;safety precaution awareness  -CS     Impairments Affecting Function (Mobility)  endurance/activity tolerance;pain;range of motion (ROM);strength  -       User Key  (r) = Recorded By, (t) = Taken By, (c) = Cosigned By    Initials Name Provider Type    CS Samantha Melo, PT Physical Therapist        Mobility     Row Name 01/03/20 0920          Bed Mobility Assessment/Treatment    Comment (Bed Mobility)  Pt UIC at start and end of therapy session.   -CS     Row Name 01/03/20 0920          Transfer Assessment/Treatment    Comment (Transfers)  VC's to push off of chair to stand and to reach back for chair to sit. VC's to step R LE out with transfers for comfort. Pt needs VC's to attend to ARROW with standing.   -     Row Name 01/03/20 0920          Sit-Stand Transfer    Sit-Stand [place] (Transfers)  conditional independence  -     Assistive Device (Sit-Stand Transfers)  walker, front-wheeled  -CS     Row Name 01/03/20 0920          Gait/Stairs Assessment/Training    97028 - Gait Training Minutes   15  -CS     Gait/Stairs Assessment/Training  gait/ambulation independence;gait pattern;distance ambulated;gait/ambulation assistive device  -CS     [place] Level (Gait)  verbal cues;stand by assist  -     Assistive Device (Gait)  walker, front-wheeled  -     Distance in Feet (Gait)  400'  -CS     Pattern (Gait)  step-through  -CS     Deviations/Abnormal Patterns (Gait)  bilateral deviations;codie decreased;gait speed decreased;stride length decreased  -CS     Bilateral Gait Deviations  forward flexed posture  -CS     Right Sided Gait Deviations  heel strike decreased;weight shift ability decreased  -CS     Comment (Gait/Stairs)  Pt able to amb 400' with RW SBA with step through gait pattern with decreased  codie. Pt limited by fatigue. VC's to maintain upright posture, increase heel strike on R LE, and to stay within walker.   -     Row Name 01/03/20 0920          Mobility Assessment/Intervention    Extremity Weight-bearing Status  right lower extremity  -CS     Right Lower Extremity (Weight-bearing Status)  weight-bearing as tolerated (WBAT)  -CS       User Key  (r) = Recorded By, (t) = Taken By, (c) = Cosigned By    Initials Name Provider Type    CS Samantha Melo, INGRID Physical Therapist        Obj/Interventions     Row Name 01/03/20 0920          General ROM    GENERAL ROM COMMENTS  -3-102 degrees of knee AAROM  -CS     Row Name 01/03/20 0920          Therapeutic Exercise    Lower Extremity (Therapeutic Exercise)  gluteal sets;heel slides, right;LAQ (long arc quad), right;quad sets, right;SAQ (short arc quad), right;SLR (straight leg raise), right  -CS     Lower Extremity Range of Motion (Therapeutic Exercise)  ankle dorsiflexion/plantar flexion, bilateral  -CS     Exercise Type (Therapeutic Exercise)  isometric contraction, static;AAROM (active assistive range of motion);AROM (active range of motion);isotonic contraction, concentric  -CS     Position (Therapeutic Exercise)  seated;supine  -CS     Sets/Reps (Therapeutic Exercise)  15 reps each   -CS     Comment (Therapeutic Exercise)  VC's for technique  -     Row Name 01/03/20 0920          Sensory Assessment/Intervention    Sensory General Assessment  no sensation deficits identified  -CS       User Key  (r) = Recorded By, (t) = Taken By, (c) = Cosigned By    Initials Name Provider Type    CS Samantha Melo, INGRID Physical Therapist        Goals/Plan     Row Name 01/03/20 0920          Bed Mobility Goal 1 (PT)    Activity/Assistive Device (Bed Mobility Goal 1, PT)  sit to supine/supine to sit  -CS     [place] Level/Cues Needed (Bed Mobility Goal 1, PT)  conditional independence  -CS     Time Frame (Bed Mobility Goal 1, PT)  long term goal (LTG);3  days  -CS     Progress/Outcomes (Bed Mobility Goal 1, PT)  goal partially met;discharged from facility  -     Row Name 01/03/20 0920          Transfer Goal 1 (PT)    Activity/Assistive Device (Transfer Goal 1, PT)  sit-to-stand/stand-to-sit;walker, rolling  -CS     [place] Level/Cues Needed (Transfer Goal 1, PT)  conditional independence  -CS     Time Frame (Transfer Goal 1, PT)  long term goal (LTG);3 days  -CS     Progress/Outcome (Transfer Goal 1, PT)  goal partially met;discharged from [facility]  -     Row Name 01/03/20 0920          Gait Training Goal 1 (PT)    Activity/Assistive Device (Gait Training Goal 1, PT)  gait (walking locomotion);walker, rolling  -CS     [place] Level (Gait Training Goal 1, PT)  conditional independence  -CS     Distance (Gait Goal 1, PT)  500 feet  -CS     Time Frame (Gait Training Goal 1, PT)  long term goal (LTG)  -CS     Progress/Outcome (Gait Training Goal 1, PT)  goal partially met;discharged from [facility]  -[facility] Name 01/03/20 0920          ROM Goal 1 (PT)    ROM Goal 1 (PT)  0-90 degrees AAROM R knee  -CS     Time Frame (ROM Goal 1, PT)  long term goal (LTG);3 days  -CS     Progress/Outcome (ROM Goal 1, PT)  goal partially met;discharged from [facility]  -       User Key  (r) = Recorded By, (t) = Taken By, (c) = Cosigned By    Initials Name Provider Type    CS Samantha Melo, PT Physical Therapist        Clinical Impression     [facility] Name 01/03/20 0920          Pain Assessment    Additional Documentation  Pain Scale: Numbers Pre/Post-Treatment (Group)  -CS     Row Name 01/03/20 0920          Pain Scale: Numbers Pre/Post-Treatment    Pain Scale: Numbers, Pretreatment  3/10  -CS     Pain Scale: Numbers, Post-Treatment  3/10  -CS     Pain Location - Side  Right  -CS     Pain Location - Orientation  posterior  -CS     Pain Location  knee  -CS     Pain Intervention(s)  Repositioned;Ambulation/increased activity  -[facility] Name 01/03/20 0920          Plan of  Care Review    Plan of Care Reviewed With  patient  -CS     Progress  improving  -CS     Outcome Summary  Pt amb 400' with RW SBA with step through gait mechanics. Pt limited by fatigue. Progressed patient's HEP and patient tolerated well - provided handout. Pt's knee AROM -3-102 degrees. Recommend patient home with assist and home health PT.   -CS     Row Name 01/03/20 0920          Physical Therapy Clinical Impression    Criteria for Skilled Interventions Met (PT Clinical Impression)  yes;treatment indicated  -CS     Rehab Potential (PT Clinical Summary)  good, to achieve stated therapy goals  -CS     Row Name 01/03/20 0920          Positioning and Restraints    Pre-Treatment Position  sitting in chair/recliner  -CS     Post Treatment Position  chair  -CS     In Chair  notified nsg;call light within reach;reclined;encouraged to call for assist;exit alarm on;legs elevated;with nsg  -CS       User Key  (r) = Recorded By, (t) = Taken By, (c) = Cosigned By    Initials Name Provider Type    Samantha Chambers PT Physical Therapist        Outcome Measures     Row Name 01/03/20 0920          How much help from another person do you currently need...    Turning from your back to your side while in flat bed without using bedrails?  4  -CS     Moving from lying on back to sitting on the side of a flat bed without bedrails?  4  -CS     Moving to and from a bed to a chair (including a wheelchair)?  4  -CS     Standing up from a chair using your arms (e.g., wheelchair, bedside chair)?  4  -CS     Climbing 3-5 steps with a railing?  3  -CS     To walk in hospital room?  3  -CS     AM-PAC 6 Clicks Score (PT)  22  -CS     Row Name 01/03/20 0920          Functional Assessment    Outcome Measure Options  AM-PAC 6 Clicks Basic Mobility (PT)  -CS       User Key  (r) = Recorded By, (t) = Taken By, (c) = Cosigned By    Initials Name Provider Type    Samantha Chambers PT Physical Therapist          PT Recommendation and  Plan  Planned Therapy Interventions (PT Eval): balance training, bed mobility training, gait training, home exercise program, neuromuscular re-education, patient/family education, ROM (range of motion), strengthening, transfer training  Outcome Summary/Treatment Plan (PT)  Anticipated Discharge Disposition (PT): home with assist, home with home health  Plan of Care Reviewed With: patient  Progress: improving  Outcome Summary: Pt amb 400' with RW SBA with step through gait mechanics. Pt limited by fatigue. Progressed patient's HEP and patient tolerated well - provided handout. Pt's knee AROM -3-102 degrees. Recommend patient home with assist and home health PT.      Time Calculation:   PT Charges     Row Name 01/03/20 0920             Time Calculation    Start Time  0920  -CS      PT Received On  01/03/20  -CS         Time Calculation- PT    Total Timed Code Minutes- PT  25 minute(s)  -CS         Timed Charges    61679 - PT Therapeutic Exercise Minutes  10  -CS      08686 - Gait Training Minutes   15  -CS        User Key  (r) = Recorded By, (t) = Taken By, (c) = Cosigned By    Initials Name Provider Type    CS Samantha Melo, PT Physical Therapist        Therapy Charges for Today     Code Description Service Date Service Provider Modifiers Qty    17127758141 HC PT THER PROC EA 15 MIN 1/3/2020 Samantha Melo, PT GP 1    31235750289 HC GAIT TRAINING EA 15 MIN 1/3/2020 Samantha Melo, INGRID GP 1          PT G-Codes  Outcome Measure Options: AM-PAC 6 Clicks Basic Mobility (PT)  AM-PAC 6 Clicks Score (PT): 22  AM-PAC 6 Clicks Score (OT): 18    Samantha Melo PT  1/3/2020

## 2020-01-03 NOTE — PROGRESS NOTES
Orthopedic Progress Note      Patient: Sarai Hardy  YOB: 1945     Date of Admission: 1/2/2020  6:02 AM Medical Record Number: [id number]     Attending Physician: Panda Obando MD    Status Post:  Procedure(s):  RIGHT PARTIAL KNEE ARTHROPLASTY Post Operative Day Number: 1    Subjective : No new orthopaedic complaints     Pain Relief: some relief with present medication.     Systemic Complaints: No Complaints  Vitals:    01/02/20 1900 01/02/20 2348 01/03/20 0420 01/03/20 0735   BP: 140/66 119/64 137/83 128/98   BP Location: Left arm Left arm Left arm Left arm   Patient Position: Lying Lying Lying Lying   Pulse: 96 88 92 81   Resp: 16 14 16 16   Temp: 97.4 °F (36.3 °C) 98.1 °F (36.7 °C) 98 °F (36.7 °C) 98.1 °F (36.7 °C)   TempSrc: Oral Oral Oral Oral   SpO2: 94% 91% 96% 95%   Weight:       Height:           Physical Exam: 74 y.o. female    General Appearance:       Alert, cooperative, in no acute distress                  Extremities:    Dressing Clean, Dry and Intact               No clinical sign of DVT        Able to do good movements of digits    Pulses:   Pulses palpable and equal bilaterally           Diagnostic Tests:     Results from last 7 days   Lab Units 01/03/20  0528 01/02/20  1118   WBC 10*3/mm3 18.77*  --    HEMOGLOBIN g/dL 11.5* 13.3   HEMATOCRIT % 34.7 39.9   PLATELETS 10*3/mm3 241  --      Results from last 7 days   Lab Units 01/03/20  0528 01/02/20  1118   SODIUM mmol/L 136 138   POTASSIUM mmol/L 3.9 4.0   CHLORIDE mmol/L 104 104   CO2 mmol/L 22.0 25.0   BUN mg/dL 17 13   CREATININE mg/dL 0.74 0.77   GLUCOSE mg/dL 125* 135*   CALCIUM mg/dL 8.7 9.3         No results found for: URICACID  No results found for: CRYSTAL  Microbiology Results (last 10 days)     ** No results found for the last 240 hours. **        Xr Knee 1 Or 2 View Right    Result Date: 1/2/2020  Status post unicompartmental medial knee arthroplasty in excellent anatomic alignment.  D:  01/02/2020 E:   01/02/2020  This report was finalized on 1/2/2020 10:55 AM by Dr. Gunner Delcid.          Current Medications:  Scheduled Meds:  acetaminophen 1,000 mg Oral Q8H   amLODIPine 5 mg Oral Daily   aspirin 81 mg Oral Q12H   meloxicam 15 mg Oral Daily   tamsulosin 0.4 mg Oral Daily     Continuous Infusions:  lactated ringers 9 mL/hr Last Rate: Stopped (01/02/20 0938)   lactated ringers 100 mL/hr Last Rate: 100 mL/hr (01/02/20 1055)   Ropivacine HCl-NaCl 10 mL/hr Last Rate: 10 mL/hr (01/02/20 1003)     PRN Meds:.bisacodyl  •  diphenhydrAMINE **OR** diphenhydrAMINE  •  docusate sodium  •  HYDROmorphone **AND** naloxone  •  ketorolac  •  labetalol  •  lactated ringers  •  ondansetron **OR** ondansetron  •  oxyCODONE  •  sodium chloride    Assessment: Status post  PARTIAL VERSES TOTAL KNEE ARTHROPLASTY RIGHT    Patient Active Problem List   Diagnosis   • Back pain   • S/P lumbar fusion   • HTN (hypertension)   • GERD (gastroesophageal reflux disease)   • Leukocytosis, likely reactive   • Acute blood loss anemia, mild, asymptomatic   • S/P lumbar discectomy   • Acute postoperative pain   • Primary osteoarthritis of right knee   • S/P right unicompartmental knee replacement   • Acute urinary retention   • Knee pain       PLAN:   Continues current post-op course  Anticoagulation: Aspirin started  Mobilize with PT as tolerated per protocol    Weight Bearing: WBAT  Discharge Plan: OK to plan for discharge in  today to home  from orthopadic perspective.    Panda Obando MD    Date: 1/3/2020    Time: 8:12 AM

## 2020-01-03 NOTE — PLAN OF CARE
Problem: Patient Care Overview  Goal: Plan of Care Review  Outcome: Ongoing (interventions implemented as appropriate)  Flowsheets  Taken 1/3/2020 1003  Progress: improving  Taken 1/3/2020 0920  Plan of Care Reviewed With: patient  Outcome Summary: Pt amb 400' with RW SBA with step through gait mechanics. Pt limited by fatigue. Progressed patient's HEP and patient tolerated well - provided handout. Pt's knee AROM -3-102 degrees. Recommend patient home with assist and home health PT.

## 2020-01-03 NOTE — PLAN OF CARE
Patient alert and oriented x4. She is pleasant. VSS. Right knee ACE wrap CDI. Ambulates with assist x1, GB and walker. Pain controlled with Arrow pump infusing at 10ml/hr and scheduled Tylenol po. Tolerating po. Adequate UOP. Voiding without difficulty. Plans to return home with spouse when medically ready.

## 2020-01-03 NOTE — PLAN OF CARE
Patient reports increasing pain today, but feels relief with PRN pain medicines. Able to ambulate frequently and voiding adequate amounts (~400/per void). VSS. Eager to discharge.

## 2020-01-03 NOTE — DISCHARGE SUMMARY
Patient Name: Sarai Hardy  MRN: [id number]  : 1945  DOS: 1/3/2020    Attending: No att. providers found    Primary Care Provider: Pam Reid MD    Date of Admission:.2020  6:02 AM    Date of Discharge:  1/3/2020    Discharge Diagnosis:   S/P right unicompartmental knee replacement    HTN (hypertension)    Leukocytosis, likely reactive    Acute blood loss anemia, mild, asymptomatic    Acute postoperative pain    Acute urinary retention    Knee pain      Hospital Course  Patient is a 74 y.o. female presented for right partial knee arthroplasty by Dr. Obando.     She underwent surgery under spinal anesthesia.  She tolerated surgery well and was admitted for further medical management.  He has been painful for several years.  She denies use of assistive device for ambulation or recent falls.     She is known to us from several previous admissions most recently 2018 for lumbar discectomy; which she recovered well.     Patient was provided pain medications as needed for pain control, along with adductor canal nerve block infusion of Ropivacaine.    Adjustments were made to pain medications to optimize postop pain management. Risks and benefits of opiate medications discussed with patient.    She was seen by PT and OT and has progressed well over her stay.  She used an IS for atelectasis prophylaxis and aspirin along with mechanicals for DVT prophylaxis.  Home medications were resumed as appropriate, and labs were monitored and remained fairly stable.     With the progress she has made, she is ready for DC home today.    She will have an Arrow pump ( instructed on it during this admit).  Discussed with patient regarding plan and she shows understanding and agreement.    Patient will have HHPT following discharge.        Procedures Performed  Pre-op Diagnosis:   Arthritis of right knee [174260]       Post-Op Diagnosis Codes:     * Arthritis of right knee [M17.11]     Procedure(s):  RIGHT  "PARTIAL KNEE ARTHROPLASTY     Staff:  Surgeon(s):  Panda Obando MD    Pertinent Test Results:    I reviewed the patient's new clinical results.   Results from last 7 days   Lab Units 20  0528 20  1118   WBC 10*3/mm3 18.77*  --    HEMOGLOBIN g/dL 11.5* 13.3   HEMATOCRIT % 34.7 39.9   PLATELETS 10*3/mm3 241  --      Results from last 7 days   Lab Units 20  0528 20  1118   SODIUM mmol/L 136 138   POTASSIUM mmol/L 3.9 4.0   CHLORIDE mmol/L 104 104   CO2 mmol/L 22.0 25.0   BUN mg/dL 17 13   CREATININE mg/dL 0.74 0.77   CALCIUM mg/dL 8.7 9.3   GLUCOSE mg/dL 125* 135*     I reviewed the patient's new imaging including images and reports.      Physical therapy: Pt amb 400' with RW SBA with step through gait mechanics. Pt limited by fatigue. Progressed patient's HEP and patient tolerated well - provided handout. Pt's knee AROM -3-102 degrees. Recommend patient home with assist and home health PT.     Discharge Assessment:    Vital Signs  Visit Vitals  /66   Pulse 81   Temp 98.1 °F (36.7 °C) (Oral)   Resp 16   Ht 154.9 cm (61\")   Wt 61.2 kg (135 lb)   SpO2 95%   BMI 25.51 kg/m²     Temp (24hrs), Av.9 °F (36.6 °C), Min:97.4 °F (36.3 °C), Max:98.1 °F (36.7 °C)      General Appearance:    Alert, cooperative, in no acute distress   Lungs:     Clear to auscultation,respirations regular, even and                   unlabored    Heart:    Regular rhythm and normal rate, normal S1 and S2   Abdomen:     Normal bowel sounds, no masses, no organomegaly, soft        non-tender, non-distended, no guarding, no rebound                 tenderness   Extremities:   Moves all extremities well, no edema, no cyanosis, no              Redness. Right knee ACE wrap CDI. Nerve block present   Pulses:   Pulses palpable and equal bilaterally   Skin:   No bleeding, bruising or rash   Neurologic:   Cranial nerves 2 - 12 grossly intact, sensation intact. Flexion and dorsiflexion intact bilateral feet.       Discharge " Disposition: Home    Discharge Medications     Discharge Medications      New Medications      Instructions Start Date   docusate sodium 100 MG capsule   100 mg, Oral, 2 Times Daily PRN      HYDROcodone-acetaminophen 7.5-325 MG per tablet  Commonly known as:  NORCO   1 tablet, Oral, Every 6 Hours PRN      Ropivacine HCl-NaCl  Commonly known as:  NAROPIN   10 mL/hr (20 mg/hr), Peripheral Nerve, Continuous         Changes to Medications      Instructions Start Date   aspirin 81 MG EC tablet  What changed:  additional instructions   81 mg, Oral, Daily, Resume in 1 month      aspirin 81 MG EC tablet  What changed:  You were already taking a medication with the same name, and this prescription was added. Make sure you understand how and when to take each.   81 mg, Oral, Every 12 Hours Scheduled, For 1 month         Continue These Medications      Instructions Start Date   amLODIPine 5 MG tablet  Commonly known as:  NORVASC   5 mg, Oral, Daily      CENTRUM ADULTS PO   1 tablet, Oral, Daily      estradiol 0.1 MG/24HR patch  Commonly known as:  CLIMARA   1 patch, Transdermal, Weekly, Currently on left lower abdomen.             Discharge Diet: Regular diet    Activity at Discharge: WBAT RLE    Follow-up Appointments  Dr. Obando per his orders       KARRIE Stephenson  01/03/20  4:20 PM

## 2020-01-05 NOTE — PROGRESS NOTES
EUGENE Krishnan    Nerve Cath Post Op Call    Patient Name: Sarai Hardy  :  1945  MRN:  [id number]  Date of Discharge: 1/3/2020    Nerve Cath Post Op Call:    Catheter Plan:Patient called/No answer/Message left to call [facility] pain service for any questions or complaints

## 2020-01-06 NOTE — PROGRESS NOTES
EUGENE Krishnan    Nerve Cath Post Op Call    Patient Name: Sarai Hardy  :  1945  MRN:  [id number]  Date of Discharge: 1/3/2020    Nerve Cath Post Op Call:    Analgesia:Good  Side Effects:None  Catheter Site:clean  Catheter Plan:The patient was instructed to call ON CALL Anesthesia provider for any questions or problems and Patient/Family member report nerve catheter previously discontinued, tip intact

## 2020-06-01 ENCOUNTER — TRANSCRIBE ORDERS (OUTPATIENT)
Dept: ADMINISTRATIVE | Facility: HOSPITAL | Age: 75
End: 2020-06-01

## 2020-06-01 DIAGNOSIS — Z12.31 VISIT FOR SCREENING MAMMOGRAM: Primary | ICD-10-CM

## 2020-08-03 ENCOUNTER — APPOINTMENT (OUTPATIENT)
Dept: MAMMOGRAPHY | Facility: HOSPITAL | Age: 75
End: 2020-08-03

## 2020-08-29 ENCOUNTER — HOSPITAL ENCOUNTER (OUTPATIENT)
Dept: MAMMOGRAPHY | Facility: HOSPITAL | Age: 75
Discharge: HOME | End: 2020-08-29
Admitting: INTERNAL MEDICINE

## 2020-08-29 DIAGNOSIS — Z12.31 VISIT FOR SCREENING MAMMOGRAM: ICD-10-CM

## 2020-08-29 PROCEDURE — 77063 BREAST TOMOSYNTHESIS BI: CPT

## 2020-08-29 PROCEDURE — 77067 SCR MAMMO BI INCL CAD: CPT

## 2020-08-29 PROCEDURE — 77063 BREAST TOMOSYNTHESIS BI: CPT | Performed by: RADIOLOGY

## 2020-08-29 PROCEDURE — 77067 SCR MAMMO BI INCL CAD: CPT | Performed by: RADIOLOGY

## 2020-10-19 NOTE — TELEPHONE ENCOUNTER
I made her annual apt for 11/19 - she said Dr. Parmar said she didn't need to come in anymore and just refills her patch and her insurance pays for it - no PA. S/P hysterectomy and she has terrible hot flashes. She is aware she must keep her apt for further refills

## 2020-10-21 ENCOUNTER — TELEPHONE (OUTPATIENT)
Dept: OBSTETRICS AND GYNECOLOGY | Facility: CLINIC | Age: 75
End: 2020-10-21

## 2020-10-21 NOTE — TELEPHONE ENCOUNTER
Patient states that she has already spoken to a nurse yesterday. Stating she needs refill for estradial patches.

## 2021-01-21 ENCOUNTER — TELEPHONE (OUTPATIENT)
Dept: OBSTETRICS AND GYNECOLOGY | Facility: CLINIC | Age: 76
End: 2021-01-21

## 2021-01-21 NOTE — TELEPHONE ENCOUNTER
Left message - unable to leave message. Last annual 6/2018. NO REFILLS. 75 year old refill for ERT, Recent right knee surgery

## 2021-01-21 NOTE — TELEPHONE ENCOUNTER
Made an annual appointment for this patient with Dr. Parmar on 2/26. She is needing her patches refilled until then.

## 2021-01-21 NOTE — TELEPHONE ENCOUNTER
Patient has been rescheduled for sooner appt on 01/22/2021 so she can consult about obtaining needed Rx refill.

## 2021-01-22 ENCOUNTER — OFFICE VISIT (OUTPATIENT)
Dept: OBSTETRICS AND GYNECOLOGY | Facility: CLINIC | Age: 76
End: 2021-01-22

## 2021-01-22 VITALS
WEIGHT: 154.6 LBS | BODY MASS INDEX: 29.19 KG/M2 | HEIGHT: 61 IN | SYSTOLIC BLOOD PRESSURE: 140 MMHG | DIASTOLIC BLOOD PRESSURE: 80 MMHG

## 2021-01-22 DIAGNOSIS — N95.2 VAGINAL ATROPHY: ICD-10-CM

## 2021-01-22 DIAGNOSIS — N95.1 MENOPAUSAL SYMPTOMS: ICD-10-CM

## 2021-01-22 DIAGNOSIS — Z79.890 HORMONE REPLACEMENT THERAPY (HRT): ICD-10-CM

## 2021-01-22 DIAGNOSIS — Z01.419 WOMEN'S ANNUAL ROUTINE GYNECOLOGICAL EXAMINATION: Primary | ICD-10-CM

## 2021-01-22 PROCEDURE — G0101 CA SCREEN;PELVIC/BREAST EXAM: HCPCS | Performed by: NURSE PRACTITIONER

## 2021-01-22 RX ORDER — ATORVASTATIN CALCIUM 10 MG/1
10 TABLET, FILM COATED ORAL DAILY
COMMUNITY
Start: 2020-12-30

## 2021-01-22 NOTE — PROGRESS NOTES
GYN Annual Exam     CC - Here for annual exam.  Requesting refills on her climara patches.        HPI  Sarai Hardy is a 75 y.o. female, , who presents for annual well woman exam.  She is postmenopausal.  Patient denies vaginal bleeding. ..  Patient reports problems with: hot flashes. There were no changes to her medical or surgical history since her last visit.. Partner Status: Marital Status: .  New Partners since last visit: no.      Additional OB/GYN History   On HRT? Yes. Details: Climara patches  Last Pap : 2018; she declines Pap smears    History of abnormal Pap smear: no  Family history of uterine, colon, breast, or ovarian cancer: no  Performs monthly Self-Breast Exam: yes  Last mammogram:   Last Completed Mammogram       Status Date      MAMMOGRAM Done 2020 MAMMO SCREENING DIGITAL TOMOSYNTHESIS BILATERAL W CAD     Patient has more history with this topic...        Last colonoscopy: Several years ago, refused to have another    Last DEXA: Unknown date and results were unkown  Exercises Regularly: yes  Feelings of Anxiety or Depression: no      Tobacco Usage?: No   OB History        3    Para   3    Term   3            AB        Living           SAB        TAB        Ectopic        Molar        Multiple        Live Births                    Health Maintenance   Topic Date Due   • COLONOSCOPY  1945   • TDAP/TD VACCINES (1 - Tdap) 1964   • ZOSTER VACCINE (1 of 2) 1995   • Pneumococcal Vaccine 65+ (1 of 1 - PPSV23) 2010   • HEPATITIS C SCREENING  11/10/2017   • ANNUAL WELLNESS VISIT  11/10/2017   • INFLUENZA VACCINE  2020   • MAMMOGRAM  2022   • MENINGOCOCCAL VACCINE  Aged Out       The additional following portions of the patient's history were reviewed and updated as appropriate: allergies, current medications, past family history, past medical history, past social history, past surgical history and problem list.    Review of  "Systems   Constitutional: Negative.    HENT: Negative.    Eyes: Negative.    Respiratory: Negative.    Cardiovascular: Negative.    Gastrointestinal: Negative.    Endocrine: Negative.    Genitourinary: Negative.    Musculoskeletal: Negative.    Skin: Negative.    Allergic/Immunologic: Negative.    Neurological: Negative.    Hematological: Negative.    Psychiatric/Behavioral: Negative.      All other systems reviewed and are negative.     I have reviewed and agree with the HPI, ROS, and historical information as entered above. Gail Horton Yu, APRN    Objective   /80   Ht 154.9 cm (61\")   Wt 70.1 kg (154 lb 9.6 oz)   LMP  (LMP Unknown)   Breastfeeding No   BMI 29.21 kg/m²     Physical Exam  Vitals signs and nursing note reviewed. Exam conducted with a chaperone present.   Constitutional:       Appearance: She is well-developed.   HENT:      Head: Normocephalic and atraumatic.   Neck:      Musculoskeletal: Normal range of motion. No muscular tenderness.      Thyroid: No thyroid mass or thyromegaly.   Pulmonary:      Effort: Pulmonary effort is normal. No retractions.   Chest:      Chest wall: No mass.      Breasts:         Right: Normal. No mass, nipple discharge, skin change or tenderness.         Left: Normal. No mass, nipple discharge, skin change or tenderness.   Abdominal:      Palpations: Abdomen is soft. Abdomen is not rigid. There is no mass.      Tenderness: There is no abdominal tenderness. There is no guarding.      Hernia: No hernia is present. There is no hernia in the left inguinal area.   Genitourinary:     General: Normal vulva.      Labia:         Right: No rash, tenderness or lesion.         Left: No rash, tenderness or lesion.       Vagina: Normal. No vaginal discharge or lesions.      Cervix: Normal.      Uterus: Normal. Not enlarged, not fixed and not tender.       Adnexa: Right adnexa normal and left adnexa normal.        Right: No mass or tenderness.          Left: No mass or " tenderness.        Rectum: No external hemorrhoid.      Comments: Vaginal atrophy  Skin:     General: Skin is warm and dry.   Neurological:      Mental Status: She is alert and oriented to person, place, and time.   Psychiatric:         Mood and Affect: Mood normal.         Behavior: Behavior normal.            Assessment and Plan    Problem List Items Addressed This Visit     None      Visit Diagnoses     Women's annual routine gynecological examination    -  Primary    Hormone replacement therapy (HRT)        Vaginal atrophy        Menopausal symptoms              1. Reviewed monthly self breast exams.  Instructed to call with lumps, pain, or breast discharge.  Yearly mammograms ordered.  2. Recommended use of Vitamin D and getting adequate calcium in her diet. (1500mg)  3. Osteoporosis screening ordered today.  4. Reviewed exercise as a preventative health measures.   5. Colonoscopy recommended.  6. Reviewed risks of ERT including increased risk of breast cancer, increased blood clots, increased heart disease.  Patient strongly desires to stay on or start ERT.  She understands we will use the lowest amount that adequately controls her symptoms.  7. Reccommended Flu Vaccine in Fall of each year.  8. RTC in 1 year or PRN with problems.   9. Rev stopping estrogen patch.  She declines.    Gail Nicholas, APRN  01/22/2021

## 2021-03-26 ENCOUNTER — TELEPHONE (OUTPATIENT)
Dept: OBSTETRICS AND GYNECOLOGY | Facility: CLINIC | Age: 76
End: 2021-03-26

## 2021-03-26 NOTE — TELEPHONE ENCOUNTER
Spoke with patient and she was wanting her results of her pap smear. Informed I did not see any results and I would look into it to see if I can tell if she had a pap smear done. After reviewing the note it says the patient declined a pap smear at her visit. I attempted to call this patient again twice and reached her  both times and she was out of the house. Will try again later.

## 2021-07-19 ENCOUNTER — TRANSCRIBE ORDERS (OUTPATIENT)
Dept: ADMINISTRATIVE | Facility: HOSPITAL | Age: 76
End: 2021-07-19

## 2021-07-19 DIAGNOSIS — Z12.31 VISIT FOR SCREENING MAMMOGRAM: Primary | ICD-10-CM

## 2022-06-13 ENCOUNTER — TELEPHONE (OUTPATIENT)
Dept: OBSTETRICS AND GYNECOLOGY | Facility: CLINIC | Age: 77
End: 2022-06-13

## 2022-06-13 NOTE — TELEPHONE ENCOUNTER
Pt states her estradioal patches are not working, she would like to know if we can call her in a stronger dose.    Please advise

## 2022-06-14 ENCOUNTER — TELEPHONE (OUTPATIENT)
Dept: OBSTETRICS AND GYNECOLOGY | Facility: CLINIC | Age: 77
End: 2022-06-14

## 2022-06-14 NOTE — TELEPHONE ENCOUNTER
Patient wanting an increase on hormone patch. No appt here since 1/2021. Pt advised to schedule appt and discuss increase with provider. Pt GINA. Appt scheduled

## 2022-06-21 ENCOUNTER — OFFICE VISIT (OUTPATIENT)
Dept: OBSTETRICS AND GYNECOLOGY | Facility: CLINIC | Age: 77
End: 2022-06-21

## 2022-06-21 VITALS
HEIGHT: 61 IN | SYSTOLIC BLOOD PRESSURE: 138 MMHG | DIASTOLIC BLOOD PRESSURE: 80 MMHG | WEIGHT: 152.2 LBS | BODY MASS INDEX: 28.74 KG/M2

## 2022-06-21 DIAGNOSIS — E66.3 OVERWEIGHT (BMI 25.0-29.9): ICD-10-CM

## 2022-06-21 DIAGNOSIS — Z01.419 WOMEN'S ANNUAL ROUTINE GYNECOLOGICAL EXAMINATION: Primary | ICD-10-CM

## 2022-06-21 DIAGNOSIS — Z12.11 SCREENING FOR COLON CANCER: ICD-10-CM

## 2022-06-21 DIAGNOSIS — Z12.31 ENCOUNTER FOR SCREENING MAMMOGRAM FOR MALIGNANT NEOPLASM OF BREAST: ICD-10-CM

## 2022-06-21 DIAGNOSIS — Z79.890 HORMONE REPLACEMENT THERAPY (HRT): ICD-10-CM

## 2022-06-21 PROCEDURE — G0101 CA SCREEN;PELVIC/BREAST EXAM: HCPCS | Performed by: NURSE PRACTITIONER

## 2022-06-21 NOTE — PROGRESS NOTES
GYN Annual Exam     CC - Here for annual exam.     Subjective   HPI  Sarai Hardy is a 77 y.o. female, , who presents for annual well woman exam.  She had  hysterectomy.  Patient reports problems with: none.  Partner Status: Marital Status: .  New Partners since last visit: no.      Patient reports that she is not currently experiencing any symptoms of urinary incontinence.    Additional OB/GYN History   Current contraception: contraceptive methods: hysterectomy   Desires to: do not start contraception  Last Pap : 2018 Neg   Last Completed Pap Smear     This patient has no relevant Health Maintenance data.        History of abnormal Pap smear: no  Family history of uterine, colon, breast, or ovarian cancer: no  Performs monthly Self-Breast Exam: yes  Last mammogram: 2020, normal  Last Completed Mammogram          Ordered - MAMMOGRAM (Every 2 Years) Ordered on 2020  Mammo Screening Digital Tomosynthesis Bilateral With CAD    2019  Mammo Screening Digital Tomosynthesis Bilateral With CAD    2018  Mammo Screening Digital Tomosynthesis Bilateral With CAD    2017  Mammo Screening Digital Tomosynthesis Bilateral With CAD    2016  Mammo screening bilateral w CAD    Only the first 5 history entries have been loaded, but more history exists.              Last colonoscopy: many years ago, refuses to have it again   Last Completed Colonoscopy     This patient has no relevant Health Maintenance data.        Last DEXA: Never   Exercises Regularly: yes  Feelings of Anxiety or Depression: no    Tobacco Usage?: No   OB History        3    Para   3    Term   3            AB        Living   3       SAB        IAB        Ectopic        Molar        Multiple        Live Births   3                Health Maintenance   Topic Date Due   • DXA SCAN  Never done   • COLORECTAL CANCER SCREENING  Never done   • TDAP/TD VACCINES (1 - Tdap) Never done   • ZOSTER  "VACCINE (1 of 2) Never done   • Pneumococcal Vaccine 65+ (1 - PCV) Never done   • HEPATITIS C SCREENING  Never done   • ANNUAL WELLNESS VISIT  Never done   • PAP SMEAR  06/28/2020   • COVID-19 Vaccine (2 - Pfizer series) 01/07/2022   • MAMMOGRAM  08/29/2022   • INFLUENZA VACCINE  10/01/2022       The additional following portions of the patient's history were reviewed and updated as appropriate: allergies, current medications, past family history, past medical history, past social history and past surgical history.    Review of Systems   Constitutional: Negative.         Hot flashes   HENT: Negative.    Eyes: Negative.    Respiratory: Negative.    Cardiovascular: Negative.    Gastrointestinal: Negative.    Endocrine: Negative.    Genitourinary: Negative.    Musculoskeletal: Negative.    Allergic/Immunologic: Negative.    Neurological: Negative.    Hematological: Negative.    Psychiatric/Behavioral: Negative.        I have reviewed and agree with the HPI, ROS, and historical information as entered above. KARRIE Frazier    Objective   /80 (BP Location: Right arm, Patient Position: Sitting, Cuff Size: Adult)   Ht 154.9 cm (61\")   Wt 69 kg (152 lb 3.2 oz)   LMP  (LMP Unknown)   Breastfeeding No   BMI 28.76 kg/m²     Physical Exam  Vitals and nursing note reviewed. Exam conducted with a chaperone present.   Constitutional:       Appearance: She is well-developed.   HENT:      Head: Normocephalic and atraumatic.   Neck:      Thyroid: No thyroid mass or thyromegaly.   Cardiovascular:      Rate and Rhythm: Normal rate and regular rhythm.      Heart sounds: No murmur heard.  Pulmonary:      Effort: Pulmonary effort is normal. No retractions.      Breath sounds: Normal breath sounds. No wheezing, rhonchi or rales.   Chest:      Chest wall: No mass or tenderness.   Breasts:      Right: Normal. No mass, nipple discharge, skin change or tenderness.      Left: Normal. No mass, nipple discharge, skin change or " tenderness.       Abdominal:      General: Bowel sounds are normal.      Palpations: Abdomen is soft. Abdomen is not rigid. There is no mass.      Tenderness: There is no abdominal tenderness. There is no guarding.      Hernia: No hernia is present. There is no hernia in the left inguinal area.   Genitourinary:     Labia:         Right: No rash, tenderness or lesion.         Left: No rash, tenderness or lesion.       Vagina: Normal. No vaginal discharge or lesions.      Uterus: Absent.       Adnexa:         Right: No mass or tenderness.          Left: No mass or tenderness.        Rectum: No external hemorrhoid.   Musculoskeletal:      Cervical back: Normal range of motion. No muscular tenderness.   Neurological:      Mental Status: She is alert and oriented to person, place, and time.   Psychiatric:         Behavior: Behavior normal.         Assessment & Plan     Assessment     Problem List Items Addressed This Visit    None     Visit Diagnoses     Women's annual routine gynecological examination    -  Primary    Relevant Orders    LIQUID-BASED PAP SMEAR, [facility] ([facility])    Hormone replacement therapy (HRT)        Overweight (BMI 25.0-29.9)        Screening for colon cancer        Relevant Orders    Cologuard - Stool, Per Rectum    Encounter for screening mammogram for malignant neoplasm of breast        Relevant Orders    Mammo Screening Digital Tomosynthesis Bilateral With CAD          1. GYN annual well woman exam.     Plan     1. Reviewed monthly self breast exams.  Instructed to call with lumps, pain, or breast discharge.  Yearly mammograms ordered.  2. Ordered mammogram today.  3. Reviewed exercise as a preventative health measures.   4. Colonoscopy recommended. Pt declines. Agreeable to cologuard.   5.   Extensively reviewed risks of ERT including increased risk of breast cancer, increased blood clots, increased heart disease.  Patient strongly desires to stay on ERT despite the risks, and she  declines to lower dose.   6.   Desires pap today.   7.   RTC in 1 year for annual.       Rabia Fatima, KARRIE  06/21/2022

## 2022-06-23 LAB — REF LAB TEST METHOD: NORMAL

## 2022-06-24 ENCOUNTER — HOSPITAL ENCOUNTER (OUTPATIENT)
Dept: MAMMOGRAPHY | Facility: HOSPITAL | Age: 77
Discharge: HOME | End: 2022-06-24
Admitting: NURSE PRACTITIONER

## 2022-06-24 DIAGNOSIS — Z12.31 ENCOUNTER FOR SCREENING MAMMOGRAM FOR MALIGNANT NEOPLASM OF BREAST: ICD-10-CM

## 2022-06-24 PROCEDURE — 77067 SCR MAMMO BI INCL CAD: CPT

## 2022-06-24 PROCEDURE — 77067 SCR MAMMO BI INCL CAD: CPT | Performed by: RADIOLOGY

## 2022-06-24 PROCEDURE — 77063 BREAST TOMOSYNTHESIS BI: CPT | Performed by: RADIOLOGY

## 2022-06-24 PROCEDURE — 77063 BREAST TOMOSYNTHESIS BI: CPT

## 2022-07-21 ENCOUNTER — HOSPITAL ENCOUNTER (EMERGENCY)
Facility: HOSPITAL | Age: 77
Discharge: HOME | End: 2022-07-21
Attending: EMERGENCY MEDICINE | Admitting: EMERGENCY MEDICINE

## 2022-07-21 VITALS
HEIGHT: 61 IN | HEART RATE: 89 BPM | WEIGHT: 145 LBS | OXYGEN SATURATION: 100 % | DIASTOLIC BLOOD PRESSURE: 108 MMHG | BODY MASS INDEX: 27.38 KG/M2 | SYSTOLIC BLOOD PRESSURE: 177 MMHG | RESPIRATION RATE: 18 BRPM | TEMPERATURE: 97.8 F

## 2022-07-21 DIAGNOSIS — T63.441A BEE STING REACTION, ACCIDENTAL OR UNINTENTIONAL, INITIAL ENCOUNTER: Primary | ICD-10-CM

## 2022-07-21 DIAGNOSIS — M79.89 SWELLING OF RIGHT HAND: ICD-10-CM

## 2022-07-21 PROCEDURE — 63710000001 PREDNISONE PER 1 MG: Performed by: EMERGENCY MEDICINE

## 2022-07-21 PROCEDURE — 63710000001 DIPHENHYDRAMINE PER 50 MG: Performed by: EMERGENCY MEDICINE

## 2022-07-21 PROCEDURE — 99283 EMERGENCY DEPT VISIT LOW MDM: CPT

## 2022-07-21 RX ORDER — DIPHENHYDRAMINE HCL 25 MG
25 TABLET ORAL EVERY 6 HOURS PRN
Qty: 20 TABLET | Refills: 0 | Status: SHIPPED | OUTPATIENT
Start: 2022-07-21 | End: 2022-07-26

## 2022-07-21 RX ORDER — FAMOTIDINE 20 MG/1
20 TABLET, FILM COATED ORAL ONCE
Status: COMPLETED | OUTPATIENT
Start: 2022-07-21 | End: 2022-07-21

## 2022-07-21 RX ORDER — PREDNISONE 20 MG/1
20 TABLET ORAL 3 TIMES DAILY
Qty: 15 TABLET | Refills: 0 | Status: SHIPPED | OUTPATIENT
Start: 2022-07-21

## 2022-07-21 RX ORDER — PREDNISONE 20 MG/1
60 TABLET ORAL ONCE
Status: COMPLETED | OUTPATIENT
Start: 2022-07-21 | End: 2022-07-21

## 2022-07-21 RX ORDER — DIPHENHYDRAMINE HCL 50 MG
50 CAPSULE ORAL ONCE
Status: COMPLETED | OUTPATIENT
Start: 2022-07-21 | End: 2022-07-21

## 2022-07-21 RX ORDER — EPINEPHRINE 0.3 MG/.3ML
0.3 INJECTION SUBCUTANEOUS ONCE
Qty: 1 EACH | Refills: 0 | Status: SHIPPED | OUTPATIENT
Start: 2022-07-21 | End: 2022-07-21

## 2022-07-21 RX ADMIN — FAMOTIDINE 20 MG: 20 TABLET ORAL at 09:14

## 2022-07-21 RX ADMIN — PREDNISONE 60 MG: 20 TABLET ORAL at 09:14

## 2022-07-21 RX ADMIN — DIPHENHYDRAMINE HYDROCHLORIDE 50 MG: 50 CAPSULE ORAL at 09:14
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
## 2022-07-21 NOTE — DISCHARGE INSTRUCTIONS
Take Benadryl, and prednisone as prescribed.    Elevate the right hand above heart level and apply ice/cold water for 20 minutes at a time.     Return to the ER with any further concern.

## 2023-08-22 DIAGNOSIS — Z79.890 HORMONE REPLACEMENT THERAPY (HRT): ICD-10-CM

## 2023-08-24 ENCOUNTER — TELEPHONE (OUTPATIENT)
Dept: OBSTETRICS AND GYNECOLOGY | Facility: CLINIC | Age: 78
End: 2023-08-24
Payer: MEDICARE

## 2023-08-24 NOTE — TELEPHONE ENCOUNTER
Spoke with patient. She was given a one-month supply in July with need for an appointment to give further refills. Informed her she will need an appointment for further refills. States she does not need to be seen. Informed her she must still be under our care for us to be able to provide prescriptions. She v/u but declined an appointment.

## 2023-08-24 NOTE — TELEPHONE ENCOUNTER
Patient wants a refill on her patches. I explained to patient that she will need an appointment because she hasn't been seen in 2 years. Patient states she aint doing nothing in about 10 years. And don't know why she can't just get her medicine.

## 2023-08-24 NOTE — TELEPHONE ENCOUNTER
Attempted to return patient's call. Left voice message to call us back. Last office visit was 6/21/2022.

## 2023-09-11 ENCOUNTER — OFFICE VISIT (OUTPATIENT)
Dept: OBSTETRICS AND GYNECOLOGY | Facility: CLINIC | Age: 78
End: 2023-09-11
Payer: MEDICARE

## 2023-09-11 VITALS
HEIGHT: 61 IN | DIASTOLIC BLOOD PRESSURE: 92 MMHG | WEIGHT: 147 LBS | BODY MASS INDEX: 27.75 KG/M2 | SYSTOLIC BLOOD PRESSURE: 128 MMHG

## 2023-09-11 DIAGNOSIS — N95.2 VAGINAL ATROPHY: ICD-10-CM

## 2023-09-11 DIAGNOSIS — Z01.411 ENCOUNTER FOR GYNECOLOGICAL EXAMINATION WITH ABNORMAL FINDING: Primary | ICD-10-CM

## 2023-09-11 DIAGNOSIS — E89.41 HOT FLASHES DUE TO SURGICAL MENOPAUSE: ICD-10-CM

## 2023-09-11 DIAGNOSIS — Z53.20 COLONOSCOPY REFUSED: ICD-10-CM

## 2023-09-11 DIAGNOSIS — Z79.890 HORMONE REPLACEMENT THERAPY (HRT): ICD-10-CM
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
## 2024-01-19 ENCOUNTER — TELEPHONE (OUTPATIENT)
Dept: OBSTETRICS AND GYNECOLOGY | Facility: CLINIC | Age: 79
End: 2024-01-19
Payer: MEDICARE

## 2024-01-19 RX ORDER — ESTRADIOL 0.1 MG/G
CREAM VAGINAL
Qty: 42.5 G | Refills: 2 | Status: SHIPPED | OUTPATIENT
Start: 2024-01-19

## 2024-01-19 NOTE — TELEPHONE ENCOUNTER
OP pt    Patient last seen on 09/11/23 for annual with EF    Patient states that she has some vaginal irritation. She states that it feels like a pinch on the left side of her vulva. She said it has been there for 10 years and it feels like a pinpoint hole. She states that her friend gave her some Premarin cream and that made it feel better. Patient is requesting some vaginal cream. 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 Patient informed that I would speak with a provider and call her back. Patient v/u.    Per EF. Send in Rx for Estrace 1/2 gram twice a week    Attempted to call pt back. Left message for patient that Rx was called in to her pharmacy.

## 2025-01-02 ENCOUNTER — TELEPHONE (OUTPATIENT)
Dept: OBSTETRICS AND GYNECOLOGY | Facility: CLINIC | Age: 80
End: 2025-01-02

## 2025-01-02 NOTE — TELEPHONE ENCOUNTER
Hub staff attempted to follow warm transfer process and was unsuccessful     Caller: Sarai Hardy    Relationship to patient: Self    Best call back number: [phone] (home)       Patient is needing: AN APPT TO BE SEEN- PT HAVING RIGHT BREAST PAIN AND VERY TEARFUL ON THE PHONE. SHE HAS SEEN DR SMITH OR [facility] IN THE PAST. WILLING TO COME INTO OFFICE ANYTIME. PLEASE CALL.

## 2025-01-03 ENCOUNTER — OFFICE VISIT (OUTPATIENT)
Dept: OBSTETRICS AND GYNECOLOGY | Facility: CLINIC | Age: 80
End: 2025-01-03
Payer: MEDICARE

## 2025-01-03 ENCOUNTER — TRANSCRIBE ORDERS (OUTPATIENT)
Dept: OBSTETRICS AND GYNECOLOGY | Facility: CLINIC | Age: 80
End: 2025-01-03

## 2025-01-03 VITALS — HEIGHT: 61 IN | BODY MASS INDEX: 26.62 KG/M2 | WEIGHT: 141 LBS

## 2025-01-03 DIAGNOSIS — Z12.31 VISIT FOR SCREENING MAMMOGRAM: Primary | ICD-10-CM

## 2025-01-03 DIAGNOSIS — N64.4 MASTODYNIA: Primary | ICD-10-CM

## 2025-01-03 PROCEDURE — 99213 OFFICE O/P EST LOW 20 MIN: CPT | Performed by: OBSTETRICS & GYNECOLOGY
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
## 2025-01-14 ENCOUNTER — OFFICE VISIT (OUTPATIENT)
Age: 80
End: 2025-01-14
Payer: MEDICARE

## 2025-01-14 VITALS
TEMPERATURE: 98.7 F | BODY MASS INDEX: 27 KG/M2 | SYSTOLIC BLOOD PRESSURE: 150 MMHG | WEIGHT: 143 LBS | DIASTOLIC BLOOD PRESSURE: 72 MMHG | OXYGEN SATURATION: 97 % | HEART RATE: 91 BPM | HEIGHT: 61 IN | RESPIRATION RATE: 18 BRPM

## 2025-01-14 DIAGNOSIS — G89.29 CHRONIC RIGHT SHOULDER PAIN: Primary | ICD-10-CM

## 2025-01-14 DIAGNOSIS — M25.511 CHRONIC RIGHT SHOULDER PAIN: Primary | ICD-10-CM

## 2025-01-14 DIAGNOSIS — I10 PRIMARY HYPERTENSION: ICD-10-CM

## 2025-01-14 PROCEDURE — 99204 OFFICE O/P NEW MOD 45 MIN: CPT

## 2025-01-14 PROCEDURE — 3078F DIAST BP <80 MM HG: CPT

## 2025-01-14 PROCEDURE — 3077F SYST BP >= 140 MM HG: CPT
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
## 2025-01-16 ENCOUNTER — TELEPHONE (OUTPATIENT)
Age: 80
End: 2025-01-16
Payer: MEDICARE

## 2025-01-16 NOTE — TELEPHONE ENCOUNTER
Caller: Sarai Hardy    Relationship: Self    Best call back number: [phone]     What is the best time to reach you: TODAY    Who are you requesting to speak with (clinical staff, provider,  specific staff member): REFFERALS    Do you know the name of the person who called: SARAI    What was the call regarding: PATIENT STATED SHE WAS TO HAVE A MAMMOGRAM TODAY AND SHE THOUGHT IT WAS ON [facility]. THEY TOLD HER IT WAS AT [facility]. SHE STATED SHE IS AGGRAVATED AND DIDN'T WANT TO GO TO THAT OFFICE. CALL PATIENT TO DISCUSS    Is it okay if the provider responds through MyChart: CALLBACK

## 2025-01-16 NOTE — TELEPHONE ENCOUNTER
Called and spoke to patient. Advised since [facility] opened, they are not doing mammograms at [facility], but they are just a few miles from the [facility] location. Provided directions and phone number for patient to call and reschedule mammogram. She verbalized understanding.

## 2025-02-14 ENCOUNTER — OFFICE VISIT (OUTPATIENT)
Age: 80
End: 2025-02-14
Payer: MEDICARE

## 2025-02-14 VITALS
DIASTOLIC BLOOD PRESSURE: 88 MMHG | OXYGEN SATURATION: 95 % | SYSTOLIC BLOOD PRESSURE: 142 MMHG | HEIGHT: 61 IN | HEART RATE: 93 BPM | BODY MASS INDEX: 26.66 KG/M2 | WEIGHT: 141.2 LBS

## 2025-02-14 DIAGNOSIS — G89.29 CHRONIC RIGHT SHOULDER PAIN: ICD-10-CM

## 2025-02-14 DIAGNOSIS — I10 PRIMARY HYPERTENSION: Primary | ICD-10-CM

## 2025-02-14 DIAGNOSIS — M25.511 CHRONIC RIGHT SHOULDER PAIN: ICD-10-CM

## 2025-02-14 NOTE — PROGRESS NOTES
Follow Up Office Visit      Date: 2025   Patient Name: Sarai Hardy  : 1945   MRN: [id number]     Chief Complaint:    Chief Complaint   Patient presents with    Shoulder Pain     R, improved    Hypertension       History of Present Illness: Sarai Hardy is a 80 y.o. female who is here today to follow up with right shoulder pain.     History of Present Illness  The patient is an 80-year-old female who presents for evaluation of shoulder pain, hypertension, and hearing loss.    She reports a general improvement in her condition. She has been managing her shoulder pain with a topical gel, which she finds effective. However, she experiences discomfort at night, necessitating additional application of the gel. She has been supplementing her pain management regimen with Tylenol but expresses dissatisfaction with its efficacy. She is considering a return to Advil or Aleve for better symptom control.    Her blood pressure remains elevated, although it is well-controlled at home with readings typically around 120/80 mmHg. She is currently on amlodipine for hypertension management. She maintains an active lifestyle, incorporating regular exercise into her routine.    She has been diagnosed with hearing loss and was advised to use hearing aids. However, due to financial constraints and lack of insurance coverage, she has not procured them. She declines a referral back to the audiologist.    She has received her pneumonia vaccine and is up-to-date with her COVID-19 vaccinations. She has a history of adverse reactions to the influenza vaccine.    MEDICATIONS  Current: Amlodipine, Tylenol.    IMMUNIZATIONS  She has had a pneumonia vaccine and her last COVID-19 vaccine at [facility].      Subjective      Review of Systems:   Review of Systems    I have reviewed the patients family history, social history, past medical history, past surgical history and have updated it as appropriate.     Medications:  "    Current Outpatient Medications:     amLODIPine (NORVASC) 5 MG tablet, Take 1 tablet by mouth Daily., Disp: , Rfl:     Diclofenac Sodium (VOLTAREN) 1 % gel gel, Apply 4 g topically to the appropriate area as directed 4 (Four) Times a Day As Needed (joint pain)., Disp: 150 g, Rfl: 3    estradiol (CLIMARA) 0.1 MG/24HR patch, 1 patch q wk, Disp: 4 patch, Rfl: 11    estradiol (ESTRACE VAGINAL) 0.1 MG/GM vaginal cream, Apply 1/2 gram (pea size amount) to vulva twice a week., Disp: 42.5 g, Rfl: 2    Multiple Vitamins-Minerals (CENTRUM ADULTS PO), Take 1 tablet by mouth Daily., Disp: , Rfl:     Allergies:   Allergies   Allergen Reactions    Codeine Dizziness    Morphine And Codeine Other (See Comments)     Makes alexandru and david       Objective     Physical Exam: Please see above  Vital Signs:   Vitals:    02/14/25 1015   BP: 142/88   BP Location: Left arm   Patient Position: Sitting   Cuff Size: Adult   Pulse: 93   SpO2: 95%   Weight: 64 kg (141 lb 3.2 oz)   Height: 154.9 cm (60.98\")   PainSc:   2   PainLoc: Shoulder     Body mass index is 26.69 kg/m².          Physical Exam  Constitutional:       Appearance: Normal appearance.   HENT:      Head: Normocephalic and atraumatic.      Nose: No congestion or rhinorrhea.      Mouth/Throat:      Pharynx: No oropharyngeal exudate or posterior oropharyngeal erythema.   Eyes:      General:         Right eye: No discharge.         Left eye: No discharge.      Extraocular Movements: Extraocular movements intact.      Pupils: Pupils are equal, round, and reactive to light.   Cardiovascular:      Rate and Rhythm: Normal rate and regular rhythm.      Heart sounds: No murmur heard.     No friction rub. No gallop.   Pulmonary:      Effort: Pulmonary effort is normal.      Breath sounds: Normal breath sounds.   Abdominal:      General: Abdomen is flat. Bowel sounds are normal. There is no distension.      Palpations: Abdomen is soft.      Tenderness: There is no abdominal tenderness. " There is no guarding or rebound.   Musculoskeletal:         General: Normal range of motion.      Cervical back: Normal range of motion.      Right lower leg: No edema.      Left lower leg: No edema.   Skin:     General: Skin is warm.      Capillary Refill: Capillary refill takes less than 2 seconds.      Findings: No rash.   Neurological:      General: No focal deficit present.      Mental Status: She is alert.   Psychiatric:         Mood and Affect: Mood normal.         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    Labs:   Hemoglobin A1C   Date Value Ref Range Status   12/19/2019 5.50 4.80 - 5.60 % Final        Imaging:   No valid procedures specified.     Assessment / Plan      Assessment/Plan:   Diagnoses and all orders for this visit:    1. Primary hypertension (Primary)    2. Chronic right shoulder pain         Assessment & Plan  1. Right shoulder pain.  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 She has been advised to continue with her current regimen of Tylenol for pain management. The potential risks associated with shoulder injections, including infection, have been discussed. If the pain persists, a prescription for a medication similar to ibuprofen, specifically for joint pain, will be provided.    2. Hypertension.  Her blood pressure remains elevated, likely due to non-adherence to her antihypertensive medication. She has been counseled on the importance of taking her medication regularly.    3. Presbycusis   She has been diagnosed with hearing loss and was advised to use hearing aids. However, due to financial constraints and lack of insurance coverage, she has not procured them.    4. Health maintenance.  She has received her pneumonia vaccine and is up-to-date with her COVID-19 vaccinations. She has a history of adverse reactions to the influenza vaccine.    Follow-up  The patient will follow up by the end  of the year for an annual checkup.    Follow Up:   Return in about 6 months (around 8/14/2025) for Annual physical.        Patient or patient representative verbalized consent for the use of Ambient Listening during the visit with  Cristiana Hannah MD for chart documentation. 2/14/2025  12:28 EST    Cristiana Hannah  [facility] [place]

## 2025-02-24 ENCOUNTER — TELEPHONE (OUTPATIENT)
Dept: OBSTETRICS AND GYNECOLOGY | Facility: CLINIC | Age: 80
End: 2025-02-24
Payer: MEDICARE

## 2025-02-24 NOTE — TELEPHONE ENCOUNTER
(Delcid: [id number])  PA Case ID #: [id number] Drug  Estradiol 0.1MG/24HR weekly patches Outcome  Approved today [facility]  PA Case: [id number], Status: Approved, Coverage Starts on: 1/1/2025 12:00:00 AM, Coverage Ends on: 2/24/2026 12:00:00 AM.  Effective Date: 12/31/2024  Authorization Expiration Date: 2/23/2026.     Pt is due for her annual this 9/2025 and at that time Dr. Parmar will again decide: Does the prescriber acknowledge that this is a high risk medication (HRM) [as identified by American Geriatric Society] for a patient greater than 65 years old and medication benefits outweigh potential risk for this patient? PLEASE NOTE: The American Geriatrics Society identifies the use of this medication as potentially inappropriate in older adults, meaning it is best prescribed at reduced dosage, or used with caution or carefully monitored, or use of the medication is avoided.

## 2025-04-18 DIAGNOSIS — Z79.890 HORMONE REPLACEMENT THERAPY (HRT): ICD-10-CM

## 2025-04-21 RX ORDER — ESTRADIOL 0.1 MG/D
PATCH TRANSDERMAL
Qty: 4 PATCH | Refills: 11 | OUTPATIENT
Start: 2025-04-21

## 2025-05-28 ENCOUNTER — TELEPHONE (OUTPATIENT)
Age: 80
End: 2025-05-28
Payer: MEDICARE

## 2025-05-28 NOTE — TELEPHONE ENCOUNTER
Called and LVM for patient to call back and schedule Annual wellness Visit.    RELAY:    Called and LVM to schedule AWV, please schedule.

## 2025-07-24 DIAGNOSIS — Z79.890 HORMONE REPLACEMENT THERAPY (HRT): ICD-10-CM

## 2025-07-25 RX ORDER — ESTRADIOL 0.1 MG/D
PATCH TRANSDERMAL
Qty: 4 PATCH | Refills: 11 | OUTPATIENT
Start: 2025-07-25

## 2025-08-11 DIAGNOSIS — Z79.890 HORMONE REPLACEMENT THERAPY (HRT): ICD-10-CM

## 2025-08-11 RX ORDER — ESTRADIOL 0.1 MG/D
PATCH TRANSDERMAL
Qty: 4 PATCH | Refills: 11 | OUTPATIENT
Start: 2025-08-11

## 2025-08-12 ENCOUNTER — OFFICE VISIT (OUTPATIENT)
Dept: OBSTETRICS AND GYNECOLOGY | Facility: CLINIC | Age: 80
End: 2025-08-12
Payer: MEDICARE

## 2025-08-12 VITALS — WEIGHT: 135.8 LBS | DIASTOLIC BLOOD PRESSURE: 80 MMHG | SYSTOLIC BLOOD PRESSURE: 140 MMHG | BODY MASS INDEX: 25.67 KG/M2

## 2025-08-12 DIAGNOSIS — Z01.419 WOMEN'S ANNUAL ROUTINE GYNECOLOGICAL EXAMINATION: Primary | ICD-10-CM

## 2025-08-12 DIAGNOSIS — Z79.890 HORMONE REPLACEMENT THERAPY (HRT): ICD-10-CM

## 2025-08-12 RX ORDER — ESTRADIOL 0.1 MG/D
PATCH TRANSDERMAL
Qty: 4 PATCH | Refills: 11 | Status: SHIPPED | OUTPATIENT
Start: 2025-08-12

## 2025-08-14 ENCOUNTER — TELEPHONE (OUTPATIENT)
Dept: OBSTETRICS AND GYNECOLOGY | Facility: CLINIC | Age: 80
End: 2025-08-14
Payer: MEDICARE

## 2025-08-14 LAB — REF LAB TEST METHOD: NORMAL

## 2025-08-15 ENCOUNTER — TELEPHONE (OUTPATIENT)
Dept: OBSTETRICS AND GYNECOLOGY | Facility: CLINIC | Age: 80
End: 2025-08-15
Payer: MEDICARE

## (undated) DEVICE — PAD ARMBRD SURG CONVOL 7.5X20X2IN

## (undated) DEVICE — ANTIBACTERIAL UNDYED BRAIDED (POLYGLACTIN 910), SYNTHETIC ABSORBABLE SUTURE: Brand: COATED VICRYL

## (undated) DEVICE — GOWN,PREVENTION PLUS,XXLARGE,STERILE: Brand: MEDLINE

## (undated) DEVICE — DRSNG WND STRIP OPTIFOAM AG SUPRABS A/MIC 4X12IN STRL

## (undated) DEVICE — PK KN TOTL 10

## (undated) DEVICE — 3.0MM PRECISION NEURO (MATCH HEAD)

## (undated) DEVICE — PK SPINE ORTHO 10

## (undated) DEVICE — NDL HYPO ECLPS SFTY 22G 1 1/2IN

## (undated) DEVICE — SYR LL 3CC

## (undated) DEVICE — DRAPE,REIN 53X77,STERILE: Brand: MEDLINE

## (undated) DEVICE — MARKER,SKIN,W/RULER,DUAL,STOP: Brand: MEDLINE

## (undated) DEVICE — SYR CONTRL LUERLOK 10CC

## (undated) DEVICE — DIFFUSER: Brand: CORE, MAESTRO

## (undated) DEVICE — SOL LR 1000ML

## (undated) DEVICE — GLV SURG SENSICARE MICRO PF LF 6 STRL

## (undated) DEVICE — INTENDED USE FOR SURGICAL MARKING ON INTACT SKIN, ALSO PROVIDES A PERMANENT METHOD OF IDENTIFYING OBJECTS IN THE OPERATING ROOM: Brand: WRITESITE® REGULAR TIP SKIN MARKER

## (undated) DEVICE — AIRWY 90MM NO9

## (undated) DEVICE — LEGGINGS, PAIR, 29X43, STERILE: Brand: MEDLINE

## (undated) DEVICE — IRRISEPT WOUND DEBRIDMENT AND CLEANSING SYSTEM: Brand: IRRISEPT

## (undated) DEVICE — GOWN,REINF,POLY,ECL,PP SLV,XL: Brand: MEDLINE

## (undated) DEVICE — GLV SURG SENSICARE MICRO PF LF 7.5 STRL

## (undated) DEVICE — GLV SURG SENSICARE W/ALOE PF LF 9 STRL

## (undated) DEVICE — SUT MNCRYL PLS ANTIB UD 3/0 PS2 27IN

## (undated) DEVICE — SYR LUERLOK 50ML

## (undated) DEVICE — GLV SURG SENSICARE MICRO PF LF 9 STRL

## (undated) DEVICE — 3M™ STERI-STRIP™ REINFORCED ADHESIVE SKIN CLOSURES, R1547, 1/2 IN X 4 IN (12 MM X 100 MM), 6 STRIPS/ENVELOPE: Brand: 3M™ STERI-STRIP™

## (undated) DEVICE — CANNULA,OXY,ADULT,SUPERSOFT,W/7'TUB,UC: Brand: MEDLINE

## (undated) DEVICE — COVER,LIGHT HANDLE,FLX,1/PK: Brand: MEDLINE INDUSTRIES, INC.

## (undated) DEVICE — PUMP PAIN AUTOFUSER AUTO SELCT NOBOLUS 1TO14ML/HR 550ML DISP

## (undated) DEVICE — ADAPT LUER STUB INTRAMEDIC PE/200 17G

## (undated) DEVICE — SPNG GZ WOVN 4X4IN 12PLY 10/BX STRL

## (undated) DEVICE — SYR LL 10ML LF

## (undated) DEVICE — GLV SURG DERMASSURE GRN LF PF 7.0

## (undated) DEVICE — PROXIMATE RH ROTATING HEAD SKIN STAPLERS (35 WIDE) CONTAINS 35 STAINLESS STEEL STAPLES: Brand: PROXIMATE

## (undated) DEVICE — OIL CARTRIDGE: Brand: CORE, MAESTRO

## (undated) DEVICE — THE MILL DISPOSABLE - MEDIUM

## (undated) DEVICE — TRY SKINPREP DRYPREP

## (undated) DEVICE — FRAZIER SUCTION INSTRUMENT 12 FR W/CONTROL VENT & OBTURATOR: Brand: FRAZIER

## (undated) DEVICE — MEDI-VAC YANKAUER SUCTION HANDLE W/BULBOUS TIP: Brand: CARDINAL HEALTH

## (undated) DEVICE — SHEET,DRAPE,53X77,STERILE: Brand: MEDLINE

## (undated) DEVICE — ADHS SKIN MASTISOL CAP 2OZ DISP

## (undated) DEVICE — TB SXN FRAZIER 8F STRL

## (undated) DEVICE — GLV SURG TRIUMPH ORTHO W/ALOE PF LTX 8.5 STRL

## (undated) DEVICE — TB SXN FRAZIER 12F STRL

## (undated) DEVICE — SPNG GZ STRL 2S 4X4 12PLY

## (undated) DEVICE — 450 ML BOTTLE OF 0.05% CHLORHEXIDINE GLUCONATE IN 99.95% STERILE WATER FOR IRRIGATION, USP AND APPLICATOR.: Brand: IRRISEPT ANTIMICROBIAL WOUND LAVAGE

## (undated) DEVICE — GLV SURG SENSICARE MICRO PF LF 6.5 STRL

## (undated) DEVICE — APPL CHLORAPREP W/TINT 26ML BLU

## (undated) DEVICE — GLV SURG SENSICARE MICRO PF LF 7 STRL

## (undated) DEVICE — GLV SURG PREMIERPRO ORTHO LTX PF SZ6.5 BRN

## (undated) DEVICE — NDL HYPO ECLPS SFTY 18G 1 1/2IN

## (undated) DEVICE — Device: Brand: STABLECUT® OXFORD™

## (undated) DEVICE — CVR HNDL LT SURG ACCSSRY BLU STRL

## (undated) DEVICE — POSTN ARM CRDL LAMIN

## (undated) DEVICE — DRP C/ARMOR

## (undated) DEVICE — BANDAGE,GAUZE,BULKEE II,4.5"X4.1YD,STRL: Brand: MEDLINE

## (undated) DEVICE — JACKSON TABLE POSITIONER KIT: Brand: MEDLINE INDUSTRIES, INC.

## (undated) DEVICE — 2963 MEDIPORE SOFT CLOTH TAPE 3 IN X 10 YD 12 RLS/CS: Brand: 3M™ MEDIPORE™

## (undated) DEVICE — GAUZE,SPONGE,4"X4",16PLY,XRAY,STRL,LF: Brand: MEDLINE

## (undated) DEVICE — TB SXN FRAZIER 10F STRL

## (undated) DEVICE — UNDERCAST PADDING: Brand: DEROYAL

## (undated) DEVICE — DRSNG TELFA PAD NONADH STR 1S 3X8IN

## (undated) DEVICE — BNDG ELAS W/CLIP 6IN 10YD LF STRL

## (undated) DEVICE — DISPOSABLE BIPOLAR FORCEPS 7 3/4" (19.7CM) SCOVILLE BAYONET, INSULATED, 1.5MM TIP AND 12 FT. (3.6M) CABLE: Brand: KIRWAN

## (undated) DEVICE — GOWN,REINF,POLY,ECL,PP SLV,3XL,XLONG: Brand: MEDLINE

## (undated) DEVICE — MEDI-VAC NON-CONDUCTIVE SUCTION TUBING: Brand: CARDINAL HEALTH

## (undated) DEVICE — HDRST INTUB GENTLETOUCH SLOT 7IN RT

## (undated) DEVICE — UNDERGLV SURG BIOGEL INDICAT PI SZ8.5 BLU

## (undated) DEVICE — FLTR HME STR UNIV W/SMPL PORT

## (undated) DEVICE — SOL ISO/ALC 70PCT 16OZ

## (undated) DEVICE — SUCTION CANISTER, 2500CC, RIGID: Brand: DEROYAL

## (undated) DEVICE — GLV SURG SENSICARE MICRO PF LF 8.5 STRL

## (undated) DEVICE — SKIN AFFIX SURG ADHESIVE 72/CS 0.55ML: Brand: MEDLINE

## (undated) DEVICE — 3 BONE CEMENT MIXER: Brand: MIXEVAC

## (undated) DEVICE — SNAP KOVER: Brand: UNBRANDED